# Patient Record
Sex: FEMALE | Race: WHITE | NOT HISPANIC OR LATINO | Employment: FULL TIME
[De-identification: names, ages, dates, MRNs, and addresses within clinical notes are randomized per-mention and may not be internally consistent; named-entity substitution may affect disease eponyms.]

---

## 2023-01-30 ENCOUNTER — HOSPITAL ENCOUNTER (OUTPATIENT)
Dept: DATA CONVERSION | Age: 28
End: 2023-01-30

## 2023-02-28 DIAGNOSIS — J40 BRONCHITIS: Primary | ICD-10-CM

## 2023-03-07 PROBLEM — F41.9 ANXIETY DISORDER: Status: ACTIVE | Noted: 2023-03-07

## 2023-03-07 PROBLEM — F32.A DEPRESSION: Status: ACTIVE | Noted: 2023-03-07

## 2023-03-07 PROBLEM — G89.29 CHRONIC PAIN: Status: ACTIVE | Noted: 2023-03-07

## 2023-03-07 PROBLEM — Q27.9 CAPILLARY MALFORMATION (HHS-HCC): Status: ACTIVE | Noted: 2023-03-07

## 2023-03-07 PROBLEM — M79.7 FIBROMYALGIA: Status: ACTIVE | Noted: 2023-03-07

## 2023-03-07 PROBLEM — E78.5 HYPERLIPIDEMIA: Status: ACTIVE | Noted: 2023-03-07

## 2023-03-07 PROBLEM — I47.11 INAPPROPRIATE SINUS TACHYCARDIA (CMS-HCC): Status: ACTIVE | Noted: 2023-03-07

## 2023-03-07 PROBLEM — L70.9 ACNE: Status: RESOLVED | Noted: 2023-03-07 | Resolved: 2023-03-07

## 2023-03-07 PROBLEM — J34.2 DEVIATED NASAL SEPTUM: Status: ACTIVE | Noted: 2023-03-07

## 2023-03-07 PROBLEM — G40.909 EPILEPSY (MULTI): Status: ACTIVE | Noted: 2023-03-07

## 2023-03-07 PROBLEM — I78.0 OSLER HEMORRHAGIC TELANGIECTASIA SYNDROME (CMS-HCC): Status: ACTIVE | Noted: 2023-03-07

## 2023-03-07 PROBLEM — R10.9 ABDOMINAL PAIN: Status: ACTIVE | Noted: 2023-03-07

## 2023-03-07 PROBLEM — R53.83 FATIGUE: Status: ACTIVE | Noted: 2023-03-07

## 2023-03-07 PROBLEM — G43.111 INTRACTABLE MIGRAINE WITH AURA WITH STATUS MIGRAINOSUS: Status: ACTIVE | Noted: 2023-03-07

## 2023-03-07 PROBLEM — R59.0 AXILLARY LYMPHADENOPATHY: Status: ACTIVE | Noted: 2023-03-07

## 2023-03-07 PROBLEM — J38.00 VOCAL CORD WEAKNESS: Status: ACTIVE | Noted: 2023-03-07

## 2023-03-07 PROBLEM — M79.672 LEFT FOOT PAIN: Status: ACTIVE | Noted: 2023-03-07

## 2023-03-07 PROBLEM — T23.262A PARTIAL THICKNESS BURN OF BACK OF LEFT HAND: Status: ACTIVE | Noted: 2023-03-07

## 2023-03-07 PROBLEM — M32.9 LUPUS (MULTI): Status: ACTIVE | Noted: 2023-03-07

## 2023-03-07 PROBLEM — N64.4 BREAST PAIN, RIGHT: Status: ACTIVE | Noted: 2023-03-07

## 2023-03-07 PROBLEM — L40.4 PSORIASIS, GUTTATE: Status: ACTIVE | Noted: 2023-03-07

## 2023-03-07 PROBLEM — K51.90 ULCERATIVE COLITIS (MULTI): Status: ACTIVE | Noted: 2023-03-07

## 2023-03-07 PROBLEM — R11.2 NAUSEA WITH VOMITING: Status: ACTIVE | Noted: 2023-03-07

## 2023-03-07 PROBLEM — E55.9 VITAMIN D DEFICIENCY: Status: ACTIVE | Noted: 2023-03-07

## 2023-03-07 PROBLEM — J30.9 ALLERGIC RHINITIS: Status: ACTIVE | Noted: 2023-03-07

## 2023-03-07 PROBLEM — N63.10 BREAST MASS, RIGHT: Status: ACTIVE | Noted: 2023-03-07

## 2023-03-07 PROBLEM — L40.9 PSORIASIS OF SCALP: Status: ACTIVE | Noted: 2023-03-07

## 2023-03-07 RX ORDER — CYCLOBENZAPRINE HCL 5 MG
TABLET ORAL
Status: ON HOLD | COMMUNITY
End: 2023-10-16 | Stop reason: ENTERED-IN-ERROR

## 2023-03-07 RX ORDER — HYDROXYZINE HYDROCHLORIDE 25 MG/1
TABLET, FILM COATED ORAL 3 TIMES DAILY PRN
COMMUNITY
End: 2023-08-01 | Stop reason: ALTCHOICE

## 2023-03-07 RX ORDER — ONDANSETRON 8 MG/1
TABLET, ORALLY DISINTEGRATING ORAL EVERY 8 HOURS PRN
COMMUNITY
Start: 2022-08-05 | End: 2023-06-07 | Stop reason: SDUPTHER

## 2023-03-07 RX ORDER — LIDOCAINE 50 MG/G
PATCH TOPICAL
COMMUNITY
Start: 2019-04-09 | End: 2023-05-16 | Stop reason: SDUPTHER

## 2023-03-07 RX ORDER — MIDODRINE HYDROCHLORIDE 5 MG/1
TABLET ORAL
COMMUNITY
End: 2023-03-19 | Stop reason: SDUPTHER

## 2023-03-07 RX ORDER — OXYCODONE HYDROCHLORIDE 5 MG/1
CAPSULE ORAL
COMMUNITY
Start: 2023-02-25 | End: 2023-04-11

## 2023-03-07 RX ORDER — RISPERIDONE 0.25 MG/1
0.25 TABLET ORAL DAILY
COMMUNITY
End: 2023-05-16 | Stop reason: SDUPTHER

## 2023-03-07 RX ORDER — LEVETIRACETAM 1000 MG/1
TABLET ORAL
COMMUNITY
End: 2023-04-11

## 2023-03-07 RX ORDER — ERENUMAB-AOOE 70 MG/ML
70 INJECTION SUBCUTANEOUS
COMMUNITY
Start: 2020-12-28 | End: 2023-04-11

## 2023-03-07 RX ORDER — METOPROLOL SUCCINATE 25 MG/1
TABLET, EXTENDED RELEASE ORAL 2 TIMES DAILY
COMMUNITY
End: 2023-03-28 | Stop reason: SDUPTHER

## 2023-03-07 RX ORDER — PREGABALIN 75 MG/1
CAPSULE ORAL 2 TIMES DAILY
COMMUNITY
End: 2023-04-11 | Stop reason: SDUPTHER

## 2023-03-07 RX ORDER — EPINEPHRINE 0.3 MG/.3ML
INJECTION SUBCUTANEOUS
COMMUNITY
Start: 2016-05-03 | End: 2023-04-11 | Stop reason: SDUPTHER

## 2023-03-10 DIAGNOSIS — J40 BRONCHITIS: ICD-10-CM

## 2023-03-10 DIAGNOSIS — R10.9 ABDOMINAL PAIN, UNSPECIFIED ABDOMINAL LOCATION: Primary | ICD-10-CM

## 2023-03-10 RX ORDER — OXYCODONE AND ACETAMINOPHEN 5; 325 MG/1; MG/1
1 TABLET ORAL 2 TIMES DAILY PRN
COMMUNITY
Start: 2023-03-01 | End: 2023-03-10 | Stop reason: SDUPTHER

## 2023-03-10 NOTE — TELEPHONE ENCOUNTER
Pt requests refill of:    Percocet 5mg  ..also a zpack, she was seen last week, she has a sinus infection    Sent to:  [facility] [place]

## 2023-03-11 RX ORDER — AZITHROMYCIN 250 MG/1
TABLET, FILM COATED ORAL
Qty: 6 TABLET | Refills: 0 | Status: SHIPPED | OUTPATIENT
Start: 2023-03-11 | End: 2023-03-13 | Stop reason: SDUPTHER

## 2023-03-13 RX ORDER — OXYCODONE AND ACETAMINOPHEN 5; 325 MG/1; MG/1
1 TABLET ORAL 2 TIMES DAILY PRN
Qty: 30 TABLET | Refills: 0 | Status: SHIPPED | OUTPATIENT
Start: 2023-03-13 | End: 2023-03-27 | Stop reason: SDUPTHER

## 2023-03-13 RX ORDER — AZITHROMYCIN 250 MG/1
TABLET, FILM COATED ORAL
Qty: 6 TABLET | Refills: 0 | Status: SHIPPED | OUTPATIENT
Start: 2023-03-13 | End: 2023-03-18

## 2023-03-14 ENCOUNTER — TELEMEDICINE (OUTPATIENT)
Dept: PRIMARY CARE | Facility: CLINIC | Age: 28
End: 2023-03-14
Payer: COMMERCIAL

## 2023-03-14 DIAGNOSIS — I78.0 OSLER HEMORRHAGIC TELANGIECTASIA SYNDROME (CMS-HCC): ICD-10-CM

## 2023-03-14 DIAGNOSIS — R10.84 GENERALIZED ABDOMINAL PAIN: ICD-10-CM

## 2023-03-14 DIAGNOSIS — G43.111 INTRACTABLE MIGRAINE WITH AURA WITH STATUS MIGRAINOSUS: ICD-10-CM

## 2023-03-14 DIAGNOSIS — G89.4 CHRONIC PAIN SYNDROME: ICD-10-CM

## 2023-03-14 DIAGNOSIS — I95.0 IDIOPATHIC HYPOTENSION: ICD-10-CM

## 2023-03-14 DIAGNOSIS — G40.909 NONINTRACTABLE EPILEPSY WITHOUT STATUS EPILEPTICUS, UNSPECIFIED EPILEPSY TYPE (MULTI): Primary | ICD-10-CM

## 2023-03-14 DIAGNOSIS — K51.018 ULCERATIVE PANCOLITIS WITH OTHER COMPLICATION (MULTI): ICD-10-CM

## 2023-03-14 PROBLEM — T23.262A PARTIAL THICKNESS BURN OF BACK OF LEFT HAND: Status: RESOLVED | Noted: 2023-03-07 | Resolved: 2023-03-14

## 2023-03-14 PROBLEM — M79.672 LEFT FOOT PAIN: Status: RESOLVED | Noted: 2023-03-07 | Resolved: 2023-03-14

## 2023-03-14 PROCEDURE — 99214 OFFICE O/P EST MOD 30 MIN: CPT | Performed by: FAMILY MEDICINE

## 2023-03-14 NOTE — PROGRESS NOTES
Subjective   Patient ID: Pretty Devine is a 28 y.o. female who presents for No chief complaint on file..    HPI     Review of Systems    Objective   There were no vitals taken for this visit.    Physical Exam    Assessment/Plan   {Assess/PlanSmartLinks:87914}

## 2023-03-15 NOTE — PROGRESS NOTES
Subjective   Patient ID: Pretty Devine is a 28 y.o. female who presents for Abdominal Pain.  HPI  Fu osler,seizures      Chronic,stable    Review of Systems  12 Systems have been reviewed as follows.  Constitutional: Fever, weight gain, weight loss, appetite change, night sweats, fatigue, chills.  Eyes : blurry, double vision, vision, loss, tearing, redness, pain, sensitivity to light, glaucoma.  Ears, nose, mouth, and throat: Hearing loss, ringing in the ears, ear pain, nasal congestion, nasal drainage, nosebleeds, mouth, throat, irritation tooth problem.  Cardiovascular :chest pain, pressure, heart racing, palpitations, sweating, leg swelling, high or low blood pressure  Pulmonary: Cough, yellow or green sputum, blood and sputum, shortness of breath, wheezing  Gastrointestinal: Nausea, vomiting, diarrhea, constipation, pain, blood in stool, or vomitus, heartburn, difficulty swallowing  Genitourinary: incontinence, abnormal bleeding, abnormal discharge, urinary frequency, urinary hesitancy, pain, impotence sexual problem, infection, urinary retention  Musculoskeletal: Pain, stiffness, joint, redness or warmth, arthritis, back pain, weakness, muscle wasting, sprain or fracture  Neuro: Weight weakness, dizziness, change in voice, change in taste change in vision, change in hearing, loss, or change of sensation, trouble walking, balance problems coordination problems, shaking, speech problem  Endocrine , cold or heat intolerance, blood sugar problem, weight gain or loss missed periods hot flashes, sweats, change in body hair, change in libido, increased thirst, increased urination  Heme/lymph: Swelling, bleeding, problem anemia, bruising, enlarged lymph nodes  Allergic/immunologic: H. plus nasal drip, watery itchy eyes, nasal drainage, immunosuppressed  The above were reviewed and noted negative except as noted in HPI and Problem List.    Objective   Physical Exam  There were no vitals taken for this visit.  Lab  Results   Component Value Date    WBC 2.8 (L) 02/25/2023    HGB 9.9 (L) 02/25/2023    HCT 30.9 (L) 02/25/2023    MCV 84 02/25/2023     02/25/2023       Assessment/Plan   Problem List Items Addressed This Visit          Nervous    Epilepsy (CMS/HCC) - Primary    Relevant Orders    Follow Up In [facility] - PCP    Chronic pain    Relevant Orders    Follow Up In [facility] - PCP    Abdominal pain    Relevant Orders    Follow Up In [facility] - PCP       Circulatory    Osler hemorrhagic telangiectasia syndrome (CMS/HCC)    Relevant Orders    Follow Up In [facility] - PCP       Digestive    Ulcerative colitis (CMS/HCC)    Relevant Orders    Follow Up In [facility] - PCP       Other    Intractable migraine with aura with status migrainosus    Relevant Orders    Follow Up In [facility] - PCP     Other Visit Diagnoses       Idiopathic hypotension        Relevant Medications    midodrine (Proamatine) 5 mg tablet        See prev visit    Virtual Visit - Audio and Visual Communication Real Time

## 2023-03-19 RX ORDER — MIDODRINE HYDROCHLORIDE 5 MG/1
5 TABLET ORAL 3 TIMES DAILY
Qty: 90 TABLET | Refills: 2 | Status: SHIPPED | OUTPATIENT
Start: 2023-03-19 | End: 2023-07-03 | Stop reason: SDUPTHER

## 2023-03-21 ENCOUNTER — APPOINTMENT (OUTPATIENT)
Dept: PRIMARY CARE | Facility: CLINIC | Age: 28
End: 2023-03-21
Payer: COMMERCIAL

## 2023-03-27 DIAGNOSIS — R10.9 ABDOMINAL PAIN, UNSPECIFIED ABDOMINAL LOCATION: ICD-10-CM

## 2023-03-27 RX ORDER — OXYCODONE HYDROCHLORIDE 5 MG/1
CAPSULE ORAL
Qty: 15 CAPSULE | Status: CANCELLED | OUTPATIENT
Start: 2023-03-27

## 2023-03-27 NOTE — TELEPHONE ENCOUNTER
Pt needs a refill on oxycodone 5mg  Dr Aguilera informed pt to remind him to send a 4 week supply this time  [facility] in [place]

## 2023-03-28 DIAGNOSIS — I47.11 INAPPROPRIATE SINUS TACHYCARDIA (CMS-HCC): Primary | ICD-10-CM

## 2023-03-28 RX ORDER — OXYCODONE AND ACETAMINOPHEN 5; 325 MG/1; MG/1
1 TABLET ORAL 2 TIMES DAILY PRN
Qty: 30 TABLET | Refills: 0 | Status: SHIPPED | OUTPATIENT
Start: 2023-03-28 | End: 2023-04-03 | Stop reason: SDUPTHER

## 2023-03-28 RX ORDER — METOPROLOL SUCCINATE 25 MG/1
25 TABLET, EXTENDED RELEASE ORAL DAILY PRN
Qty: 30 TABLET | Refills: 2 | Status: SHIPPED | OUTPATIENT
Start: 2023-03-28 | End: 2023-05-06 | Stop reason: ALTCHOICE

## 2023-03-28 NOTE — TELEPHONE ENCOUNTER
----- Message from Pretty Devine sent at 3/28/2023  8:31 AM EDT -----  Regarding: Medication   Contact: [phone]  Can you please sent over Metoprolol Tartrate 25 Mg PRN for heart rate over 120, and then the pain medication 5/325 and he said a 4 week supply at twice a day!   Thank you   Pretty Devine  [phone]

## 2023-03-29 DIAGNOSIS — R00.0 INCREASED HEART RATE: Primary | ICD-10-CM

## 2023-03-29 RX ORDER — METOPROLOL TARTRATE 25 MG/1
TABLET, FILM COATED ORAL
COMMUNITY
Start: 2023-02-27 | End: 2023-03-29 | Stop reason: SDUPTHER

## 2023-03-29 RX ORDER — METOPROLOL TARTRATE 25 MG/1
TABLET, FILM COATED ORAL
Qty: 30 TABLET | Refills: 2 | Status: SHIPPED | OUTPATIENT
Start: 2023-03-29

## 2023-03-29 NOTE — TELEPHONE ENCOUNTER
----- Message from Pretty Devine sent at 3/28/2023  8:31 AM EDT -----  Regarding: Medication   Contact: [phone]  Can you please sent over Metoprolol Tartrate 25 Mg PRN for heart rate over 120, and then the pain medication 5/325 and he said a 4 week supply at twice a day!   Thank you   Pretty Devine  [phone]

## 2023-03-29 NOTE — TELEPHONE ENCOUNTER
----- Message from Pretty Devine sent at 3/28/2023  8:31 AM EDT -----  Regarding: Medication   Contact: [phone]  Can you please sent over Metoprolol Tartrate 25 Mg PRN for heart rate over 120, and then the pain medication 5/325 and he said a 4 week supply at twice a day!   Thank you   Pretty Devine  [phone]

## 2023-04-03 ENCOUNTER — TELEPHONE (OUTPATIENT)
Dept: PRIMARY CARE | Facility: CLINIC | Age: 28
End: 2023-04-03

## 2023-04-03 DIAGNOSIS — R10.9 ABDOMINAL PAIN, UNSPECIFIED ABDOMINAL LOCATION: ICD-10-CM

## 2023-04-03 RX ORDER — OXYCODONE AND ACETAMINOPHEN 5; 325 MG/1; MG/1
1 TABLET ORAL ONCE
Qty: 1 TABLET | Refills: 0 | Status: SHIPPED | OUTPATIENT
Start: 2023-04-03 | End: 2023-04-07 | Stop reason: SDUPTHER

## 2023-04-03 NOTE — TELEPHONE ENCOUNTER
Dr Aguilera pt    Pt phoned office and stated CB told her that if she needed her oxycodone upped to 3 times a day to call the office and let him know. Pt is calling to have this changed and would like the script sent to [facility] cassandra Hassan

## 2023-04-04 ENCOUNTER — TELEPHONE (OUTPATIENT)
Dept: PRIMARY CARE | Facility: CLINIC | Age: 28
End: 2023-04-04

## 2023-04-06 ENCOUNTER — TELEPHONE (OUTPATIENT)
Dept: PRIMARY CARE | Facility: CLINIC | Age: 28
End: 2023-04-06

## 2023-04-06 NOTE — TELEPHONE ENCOUNTER
Dr. Aguilera patient  Requesting refill Percocet, two times daily  [facility] in [place] on [address]     Patient asking for a call when sent

## 2023-04-07 DIAGNOSIS — R10.9 ABDOMINAL PAIN, UNSPECIFIED ABDOMINAL LOCATION: ICD-10-CM

## 2023-04-07 RX ORDER — OXYCODONE AND ACETAMINOPHEN 5; 325 MG/1; MG/1
1 TABLET ORAL 3 TIMES DAILY
Qty: 6 TABLET | Refills: 0 | Status: SHIPPED | OUTPATIENT
Start: 2023-04-11 | End: 2023-04-11 | Stop reason: SDUPTHER

## 2023-04-07 NOTE — PROGRESS NOTES
Dr. Aguilera patient OARRS reviewed [employer]DS contract reviewed patient reports that Dr. Aguilera has approved increasing narcotic to 3 times per day.  I will send a short prescription for  on 4/11 and pt has follow up with pcp on 4/13

## 2023-04-11 ENCOUNTER — TELEMEDICINE (OUTPATIENT)
Dept: PRIMARY CARE | Facility: CLINIC | Age: 28
End: 2023-04-11
Payer: COMMERCIAL

## 2023-04-11 DIAGNOSIS — I47.11 INAPPROPRIATE SINUS TACHYCARDIA (CMS-HCC): ICD-10-CM

## 2023-04-11 DIAGNOSIS — F31.9 BIPOLAR 1 DISORDER (MULTI): ICD-10-CM

## 2023-04-11 DIAGNOSIS — I78.0 OSLER HEMORRHAGIC TELANGIECTASIA SYNDROME (CMS-HCC): Primary | ICD-10-CM

## 2023-04-11 DIAGNOSIS — J30.1 SEASONAL ALLERGIC RHINITIS DUE TO POLLEN: ICD-10-CM

## 2023-04-11 DIAGNOSIS — M32.9 LUPUS (MULTI): ICD-10-CM

## 2023-04-11 DIAGNOSIS — R10.9 ABDOMINAL PAIN, UNSPECIFIED ABDOMINAL LOCATION: ICD-10-CM

## 2023-04-11 DIAGNOSIS — G40.909 NONINTRACTABLE EPILEPSY WITHOUT STATUS EPILEPTICUS, UNSPECIFIED EPILEPSY TYPE (MULTI): ICD-10-CM

## 2023-04-11 DIAGNOSIS — K51.018 ULCERATIVE PANCOLITIS WITH OTHER COMPLICATION (MULTI): ICD-10-CM

## 2023-04-11 PROCEDURE — 99214 OFFICE O/P EST MOD 30 MIN: CPT | Performed by: FAMILY MEDICINE

## 2023-04-11 RX ORDER — LEVOFLOXACIN 750 MG/1
1 TABLET ORAL DAILY
COMMUNITY
Start: 2022-11-18 | End: 2023-05-06 | Stop reason: ALTCHOICE

## 2023-04-11 RX ORDER — PROMETHAZINE HYDROCHLORIDE 12.5 MG/1
1 TABLET ORAL
COMMUNITY
Start: 2022-08-05 | End: 2023-05-06 | Stop reason: ALTCHOICE

## 2023-04-11 RX ORDER — ONDANSETRON HYDROCHLORIDE 8 MG/1
1 TABLET, FILM COATED ORAL EVERY 8 HOURS PRN
COMMUNITY
Start: 2022-06-07 | End: 2023-05-06 | Stop reason: ALTCHOICE

## 2023-04-11 RX ORDER — VENLAFAXINE 37.5 MG/1
37.5 TABLET ORAL
COMMUNITY
Start: 2022-10-05 | End: 2023-05-06 | Stop reason: ALTCHOICE

## 2023-04-11 RX ORDER — OXYCODONE AND ACETAMINOPHEN 5; 325 MG/1; MG/1
1 TABLET ORAL 3 TIMES DAILY
Qty: 30 TABLET | Refills: 0 | Status: SHIPPED | OUTPATIENT
Start: 2023-04-11 | End: 2023-04-21 | Stop reason: SDUPTHER

## 2023-04-11 RX ORDER — CEFDINIR 300 MG/1
CAPSULE ORAL
COMMUNITY
Start: 2022-11-29 | End: 2023-05-06 | Stop reason: ALTCHOICE

## 2023-04-11 RX ORDER — NALOXONE HYDROCHLORIDE 4 MG/.1ML
4 SPRAY NASAL AS NEEDED
Qty: 2 EACH | Refills: 0
Start: 2023-04-11 | End: 2024-04-10

## 2023-04-11 RX ORDER — EPINEPHRINE 0.3 MG/.3ML
INJECTION SUBCUTANEOUS
Qty: 2 EACH | Refills: 2 | Status: SHIPPED | OUTPATIENT
Start: 2023-04-11

## 2023-04-11 RX ORDER — PREGABALIN 75 MG/1
75 CAPSULE ORAL 2 TIMES DAILY
Qty: 60 CAPSULE | Refills: 0 | Status: SHIPPED | OUTPATIENT
Start: 2023-04-11 | End: 2023-05-06 | Stop reason: ALTCHOICE

## 2023-04-11 ASSESSMENT — ENCOUNTER SYMPTOMS
HEMATOCHEZIA: 0
ARTHRALGIAS: 1
FREQUENCY: 0
FEVER: 0
FLATUS: 0
DIARRHEA: 0
DYSURIA: 0
MYALGIAS: 1
VOMITING: 0
HEMATURIA: 0
BELCHING: 0
WEIGHT LOSS: 0
ANOREXIA: 0
HEADACHES: 0
ABDOMINAL PAIN: 1
CONSTIPATION: 0
NAUSEA: 1

## 2023-04-11 NOTE — PROGRESS NOTES
Subjective   Patient ID: Pretty Devine is a 28 y.o. female who presents for Abdominal Pain.    Abdominal Pain  This is a chronic problem. The current episode started more than 1 year ago. The onset quality is gradual. The problem occurs constantly. The most recent episode lasted 14 months. The problem has been waxing and waning. The pain is located in the generalized abdominal region. The pain is at a severity of 6/10. The quality of the pain is sharp. The abdominal pain radiates to the RLQ. Associated symptoms include arthralgias, myalgias and nausea. Pertinent negatives include no anorexia, belching, constipation, diarrhea, dysuria, fever, flatus, frequency, headaches, hematochezia, hematuria, melena, vomiting or weight loss. The pain is aggravated by movement. The pain is relieved by Nothing.        Review of Systems   Constitutional:  Negative for fever and weight loss.   Gastrointestinal:  Positive for abdominal pain and nausea. Negative for anorexia, constipation, diarrhea, flatus, hematochezia, melena and vomiting.   Genitourinary:  Negative for dysuria, frequency and hematuria.   Musculoskeletal:  Positive for arthralgias and myalgias.   Neurological:  Negative for headaches.       Objective   There were no vitals taken for this visit.    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    Relevant Medications    EPINEPHrine 0.3 mg/0.3 mL injection syringe    Other Relevant Orders    Follow Up In [facility] - PCP    Inappropriate sinus tachycardia    Relevant Medications    EPINEPHrine 0.3 mg/0.3 mL injection syringe    Other Relevant Orders    Follow Up In [facility] - PCP       Digestive    Ulcerative colitis (CMS/[facility])    Relevant Orders    Follow Up In [facility] - PCP       Other    Allergic rhinitis    Relevant Orders    Follow Up In [facility] - PCP    Lupus (CMS/[facility])    Relevant Orders    Follow Up In [facility] - PCP     See prev visit    Continue current medications and therapy for chronic medical conditions    Patient's use of medication is allowing patient to be able to perform ADL's. Patient is always being evaluated for the possibility of lowering the medication dosage.    I have personally reviewed the OARRS report with the patient and have considered the risk of abuse, addiction, dependence and diversion.    Virtual Visit - Audio and Visual Communication Real Time

## 2023-04-13 ENCOUNTER — APPOINTMENT (OUTPATIENT)
Dept: PRIMARY CARE | Facility: CLINIC | Age: 28
End: 2023-04-13
Payer: COMMERCIAL

## 2023-04-19 ENCOUNTER — PATIENT MESSAGE (OUTPATIENT)
Dept: PRIMARY CARE | Facility: CLINIC | Age: 28
End: 2023-04-19
Payer: COMMERCIAL

## 2023-04-19 DIAGNOSIS — R10.9 ABDOMINAL PAIN, UNSPECIFIED ABDOMINAL LOCATION: ICD-10-CM

## 2023-04-19 DIAGNOSIS — L40.9 PSORIASIS OF SCALP: Primary | ICD-10-CM

## 2023-04-19 RX ORDER — DESOXIMETASONE 2.5 MG/G
0.5 CREAM TOPICAL DAILY
COMMUNITY
Start: 2017-12-26 | End: 2023-04-19 | Stop reason: SDUPTHER

## 2023-04-21 ENCOUNTER — TELEPHONE (OUTPATIENT)
Dept: PRIMARY CARE | Facility: CLINIC | Age: 28
End: 2023-04-21

## 2023-04-21 DIAGNOSIS — L40.9 PSORIASIS OF SCALP: ICD-10-CM

## 2023-04-21 DIAGNOSIS — R10.9 ABDOMINAL PAIN, UNSPECIFIED ABDOMINAL LOCATION: ICD-10-CM

## 2023-04-21 RX ORDER — OXYCODONE AND ACETAMINOPHEN 5; 325 MG/1; MG/1
1 TABLET ORAL EVERY 8 HOURS PRN
Qty: 45 TABLET | Refills: 0 | Status: SHIPPED | OUTPATIENT
Start: 2023-04-21 | End: 2023-05-05 | Stop reason: SDUPTHER

## 2023-04-21 RX ORDER — DESOXIMETASONE 2.5 MG/G
1 CREAM TOPICAL 2 TIMES DAILY
Qty: 15 G | Refills: 1 | Status: SHIPPED | OUTPATIENT
Start: 2023-04-21

## 2023-04-21 RX ORDER — OXYCODONE AND ACETAMINOPHEN 5; 325 MG/1; MG/1
1 TABLET ORAL 3 TIMES DAILY
Qty: 30 TABLET | Refills: 0 | OUTPATIENT
Start: 2023-04-21

## 2023-04-21 RX ORDER — TRIAMCINOLONE ACETONIDE 1 MG/G
CREAM TOPICAL 2 TIMES DAILY
Qty: 80 G | Refills: 2 | Status: SHIPPED | OUTPATIENT
Start: 2023-04-21

## 2023-04-21 NOTE — TELEPHONE ENCOUNTER
Dr. Aguilera patient    Pharmacy says that desoximetasone (Topicort) 0.25 % cream is not covered by insurance.  Would CB like to prescribe something else?

## 2023-05-02 ENCOUNTER — APPOINTMENT (OUTPATIENT)
Dept: PRIMARY CARE | Facility: CLINIC | Age: 28
End: 2023-05-02
Payer: COMMERCIAL

## 2023-05-04 ENCOUNTER — APPOINTMENT (OUTPATIENT)
Dept: PRIMARY CARE | Facility: CLINIC | Age: 28
End: 2023-05-04
Payer: COMMERCIAL

## 2023-05-05 DIAGNOSIS — R10.9 ABDOMINAL PAIN, UNSPECIFIED ABDOMINAL LOCATION: ICD-10-CM

## 2023-05-05 NOTE — TELEPHONE ENCOUNTER
Dr. Aguilera patient  Refill for Percocet  [id number] - [place], [place]  Will run out this weekend

## 2023-05-06 ENCOUNTER — TELEMEDICINE (OUTPATIENT)
Dept: PRIMARY CARE | Facility: CLINIC | Age: 28
End: 2023-05-06
Payer: COMMERCIAL

## 2023-05-06 DIAGNOSIS — R10.31 RIGHT LOWER QUADRANT ABDOMINAL PAIN: Primary | ICD-10-CM

## 2023-05-06 PROCEDURE — 99213 OFFICE O/P EST LOW 20 MIN: CPT | Performed by: FAMILY MEDICINE

## 2023-05-06 RX ORDER — ONDANSETRON 4 MG/1
1 TABLET, FILM COATED ORAL EVERY 8 HOURS PRN
COMMUNITY
Start: 2017-02-02 | End: 2023-06-15

## 2023-05-06 RX ORDER — METOPROLOL TARTRATE 25 MG/1
TABLET, FILM COATED ORAL
COMMUNITY
Start: 2019-02-12 | End: 2023-05-06 | Stop reason: ALTCHOICE

## 2023-05-06 RX ORDER — METOPROLOL SUCCINATE 25 MG/1
25 TABLET, EXTENDED RELEASE ORAL 2 TIMES DAILY
COMMUNITY
Start: 2019-11-20 | End: 2023-10-05 | Stop reason: SDUPTHER

## 2023-05-06 RX ORDER — RISPERIDONE 0.5 MG/1
1 TABLET ORAL NIGHTLY
COMMUNITY
Start: 2019-03-01 | End: 2023-05-06 | Stop reason: ALTCHOICE

## 2023-05-06 RX ORDER — LIDOCAINE 50 MG/G
PATCH TOPICAL
COMMUNITY
Start: 2019-03-08 | End: 2023-05-06 | Stop reason: ALTCHOICE

## 2023-05-06 RX ORDER — ONDANSETRON 8 MG/1
TABLET, ORALLY DISINTEGRATING ORAL EVERY 8 HOURS PRN
COMMUNITY
End: 2023-05-06 | Stop reason: ALTCHOICE

## 2023-05-06 RX ORDER — PREGABALIN 75 MG/1
CAPSULE ORAL 2 TIMES DAILY
COMMUNITY
End: 2023-06-19 | Stop reason: SDUPTHER

## 2023-05-06 RX ORDER — OXYCODONE AND ACETAMINOPHEN 5; 325 MG/1; MG/1
TABLET ORAL 2 TIMES DAILY PRN
COMMUNITY
End: 2023-06-02 | Stop reason: SDUPTHER

## 2023-05-06 RX ORDER — OXYCODONE AND ACETAMINOPHEN 5; 325 MG/1; MG/1
1 TABLET ORAL EVERY 8 HOURS PRN
Qty: 45 TABLET | Refills: 0 | Status: SHIPPED | OUTPATIENT
Start: 2023-05-06 | End: 2023-05-16 | Stop reason: ALTCHOICE

## 2023-05-06 NOTE — PROGRESS NOTES
Calls with c/o abdominal  pain causing anxiety  Admitted last Sunday to hospital for same abdominal pain  Got discharged late Friday night--   abdominal pain and  max  Had CT scan and MRCP during inpatient  Gave her IV meds for pain and it helped--narcotics--cannot take any NSAIDS because of bleeding risk  Kept in hospital for pain control and low Iron--got iron infusions  Gave her an option to stay or go-- she signed AMA but didn't realize she was signing out AMA--she was confused and when she left, they did not send her with any meds for her pain  Now she is in pain and very anxious about it--    Prior hx:  Had gallbladder out 3 months ago--that did not help with pain at all  Has had 15 abdominal surgeries-- horrible adhesions  Lost 100# was 102# they thought taking GB out may help with pain and to gain weight  Have DM but does not take meds--lost weight and no longer needs meds  Lost the weight in 6-7 months-- eating 3 meals a day 5-6 snacks 3 ensures and could not keep weight on  Abdominal pain is chronic--it got really bad last Saturday-- 7 days ago  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--  Dad had this disorder too--dx in his 40's but not as severe--  Usually has a femoral catheter to look for AVM--last had that a year ago-- this pain is worse than ---  Pain feels like someone stabbing twisting pulling-- whole lower right side-- still has appendix--   Appetite-- not good because of pain and anxiety--  Soup for lunch and boost for breakfast  No fever chills aches  Didn't sleep well last night  ER visit prior was prior to Gallbladder surgery-  UC has not been an issue since her early 20's  This is worse than her usual pain--constant-- nothing makes it worse--sits with heating pad helps a bit  Food makes it worse  No vomiting  +nausea  Pain into right side but into back  No urinary sxs  No diarrhea or constipation  Nl BM this am no blood--not black or tarry  Was anemic last week-- hematocrit <11 iron  infusion--- never had a blood transfusion  Eats ice as soon as she gets below 11--     Basically calls for pain meds or if I cannot give pain meds,  something to help with anxiety    ALL OTHER ROS (-)    General appearance:  Vitals available from patient?No  Alert, oriented, pleasant, in no apparent distress Yes  Answers questions appropriatelyYes  Eyes clear?Yes  Throat exam Not Available  Is patient in respiratory distressNo  Is patient coughing during consult:No  Audible wheezing noted? :No  Pt sounds congested?: No  Sniffing or rhinorrhea?: No  Frontal sinus TTP by palpation? N/A  Maxillary sinus TTP by palpation?N/A  Tender neck LAD by pt exam?:N/A  Preauricular LAD by pt exam?:N/A  Abdominal TTP by pt exam?:Yes  CVS tenderness by pt exam?N/A  Psychiatric: Affect normalYes  Other relevant physical exam:      Pt location in [place] and consent obtained. Telemedicine appropriate evaluation completed. Appropriate physical exam done. \    A/P  Pt needs pain meds as she cannot take NSAIDs and the tylenol does not help  I called and spoke with her PCP (Dr. Aguilera) as I cannot write for narcotics or antianxiety meds over telemedicine.  He will send her rx for pain to her pharmacy.  I called patient and informed her of this and she will follow up with him next week and will be seen sooner in ED of symptoms worsen.  All meds reviewed in detail with patient at today's visit

## 2023-05-07 NOTE — PATIENT INSTRUCTIONS
[email or web address]  FOR NON URGENT questions only.  Allow up to 72 hours for response.   Dr. Aguilera will call you in medication for your pain    Rest and drink plenty of fluids    Tylenol as needed for pain and fever (unless you have been told not to take these because of your personal medical history)    Discussed options and precautions:   Viral versus bacterial infection; use of medications; possible side effects; appropriate over-the-counter medications; possible complications and /or when to follow-up.    Follow-up in 1 to 2 days if not improving.  Follow-up immediately if symptoms worsen.    All red flags requiring in person care were discussed.  All patient's questions were answered.    Limitations to telemedicine include inability to do a complete and accurate physical exam.  Any concerns regarding this were conveyed with the patient and in person follow-up recommended if patient nature of illness does not progress as anticipated during this visit.

## 2023-05-08 ENCOUNTER — PATIENT OUTREACH (OUTPATIENT)
Dept: PRIMARY CARE | Facility: CLINIC | Age: 28
End: 2023-05-08
Payer: COMMERCIAL

## 2023-05-08 NOTE — PROGRESS NOTES
Discharge Facility: [facility]  Discharge Diagnosis: Dickinson-Osler syndrome  Admission Date:  4/29/23  Discharge Date:  5/5/23- AMA  PCP Appointment Date: TBD  Specialist Appointment Date:   Follow-Up Appointment 01:           Physician/Dept/Service:   Dr. Christian Mast          Reason for Referral:   HHT follow up          Call to Schedule in:   2 weeks  Follow-Up Appointment 02:           Physician/Dept/Service:   Dr. Hill          Reason for Referral:   GI follow up          Call to Schedule in:   2 weeks  Follow-Up Appointment 03:           Physician/Dept/Service:   Dr. Resendez          Reason for Referral:   migraine follow up          Call to Schedule in:   2 weeks    Hospital Encounter and Summary: Linked   2 attempts were made to reach patient to assess needs.   No return call as of this note.

## 2023-05-11 ENCOUNTER — TELEMEDICINE (OUTPATIENT)
Dept: PRIMARY CARE | Facility: CLINIC | Age: 28
End: 2023-05-11
Payer: COMMERCIAL

## 2023-05-11 ENCOUNTER — TELEPHONE (OUTPATIENT)
Dept: PRIMARY CARE | Facility: CLINIC | Age: 28
End: 2023-05-11

## 2023-05-11 DIAGNOSIS — F31.9 BIPOLAR 1 DISORDER (MULTI): ICD-10-CM

## 2023-05-11 DIAGNOSIS — I78.0 OSLER HEMORRHAGIC TELANGIECTASIA SYNDROME (CMS-HCC): ICD-10-CM

## 2023-05-11 DIAGNOSIS — L40.4 PSORIASIS, GUTTATE: ICD-10-CM

## 2023-05-11 DIAGNOSIS — M32.9 LUPUS (MULTI): ICD-10-CM

## 2023-05-11 DIAGNOSIS — G40.909 NONINTRACTABLE EPILEPSY WITHOUT STATUS EPILEPTICUS, UNSPECIFIED EPILEPSY TYPE (MULTI): Primary | ICD-10-CM

## 2023-05-11 DIAGNOSIS — K51.018 ULCERATIVE PANCOLITIS WITH OTHER COMPLICATION (MULTI): ICD-10-CM

## 2023-05-11 PROCEDURE — 99496 TRANSJ CARE MGMT HIGH F2F 7D: CPT | Performed by: FAMILY MEDICINE

## 2023-05-11 NOTE — PROGRESS NOTES
Subjective   Patient ID: Pretty Devine is a 28 y.o. female who presents for Hospital Follow-up (Patient presents in telehealth visit for a recent hospitalization. Patient was seen at [facility] due to severe abdominal pains. Patient was seen from 4/29/23 until 5/5/23. Patient admits she has been experiencing a severe abdominal pains, fatigue and nausea. ) and Medication Problem (Patient is currently being prescribed Oxycodone acetaminophen 5-325 mg 1 tab bid prn which she feels is not helping her pains. Patient would like to discuss a possible increase or alternative to help with her severe pains. ).    HPI   Hospital Follow up-  Patient presents in office for a follow up from a recent hospitalization. Patient was admitted into [facility] on 04/29/2023 and discharged on 05/05/2023. Patient admits she went to the ER due to severe abdominal pains she was experiencing. Since being discharged patient admits she has still been experiencing severe abdominal pains, fatigue and nausea.     Review of Systems  Constitutional: Fever, weight gain, weight loss, appetite change, night sweats, fatigue, chills.  Eyes : blurry, double vision, vision, loss, tearing, redness, pain, sensitivity to light, glaucoma.  Ears: nose, mouth, and throat: Hearing loss, ringing in the ears, ear pain, nasal congestion, nasal drainage, nosebleeds, mouth, throat, irritation tooth problem.  Cardiovascular: chest pain, pressure, heart racing, palpitations, sweating, leg swelling, high or low blood pressure  Pulmonary: Cough, yellow or green sputum, blood and sputum, shortness of breath, wheezing  Gastrointestinal: Nausea, vomiting, diarrhea, constipation, pain, blood in stool, or vomitus, heartburn, difficulty swallowing  Genitourinary: incontinence, abnormal bleeding, abnormal discharge, urinary frequency, urinary hesitancy, pain, impotence sexual problem, infection, urinary retention  Musculoskeletal: Pain, stiffness, joint, redness or warmth,  arthritis, back pain, weakness, muscle wasting, sprain or fracture  Neuro: Weight weakness, dizziness, change in voice, change in taste change in vision, change in hearing, loss, or change of sensation, trouble walking, balance problems coordination problems, shaking, speech problem  Endocrine: cold or heat intolerance, blood sugar problem, weight gain or loss missed periods hot flashes, sweats, change in body hair, change in libido, increased thirst, increased urination  Heme/lymph: Swelling, bleeding, problem anemia, bruising, enlarged lymph nodes  Allergic/immunologic: H. plus nasal drip, watery itchy eyes, nasal drainage, immunosuppressed  The above were reviewed and noted negative except as noted in HPI and Problem List.   Physical Exam  Constitutional: Well developed, well nourished, alert and in no acute distress   Psychiatric: Mood calm and affect normal    Assessment/Plan   Problem List Items Addressed This Visit          Nervous    Epilepsy (CMS/HCC) - Primary    Relevant Orders    Follow Up In [facility] - PCP       Circulatory    Osler hemorrhagic telangiectasia syndrome (CMS/HCC)    Relevant Orders    Follow Up In [facility] - PCP       Digestive    Ulcerative colitis (CMS/HCC)    Relevant Orders    Follow Up In [facility] - PCP       Immune    Psoriasis, guttate    Relevant Orders    Follow Up In [facility] - PCP       Other    Lupus (CMS/HCC)    Relevant Orders    Follow Up In [facility] - PCP    Bipolar 1 disorder (CMS/[facility])    Relevant Orders    Follow Up In [facility] - PCP     Continue current medications and therapy for chronic medical conditions    Patient's use of medication is allowing patient to be able to perform ADL's. Patient is always being evaluated for the possibility of lowering the medication dosage.    I have personally reviewed the OARRS report with the patient and have considered the risk of abuse, addiction,  dependence and diversion.    Virtual Visit - Audio and Visual Communication Real Time     See last visit alljarethpts

## 2023-05-12 ENCOUNTER — TELEPHONE (OUTPATIENT)
Dept: PRIMARY CARE | Facility: CLINIC | Age: 28
End: 2023-05-12
Payer: COMMERCIAL

## 2023-05-12 NOTE — TELEPHONE ENCOUNTER
Dr. Aguilera patient    Patient is asking if there is another medication she can be prescribed that will last longer and that she can take less frequently.    Please advise, thank you

## 2023-05-15 ENCOUNTER — TELEMEDICINE (OUTPATIENT)
Dept: PRIMARY CARE | Facility: CLINIC | Age: 28
End: 2023-05-15
Payer: COMMERCIAL

## 2023-05-15 DIAGNOSIS — I78.0 OSLER HEMORRHAGIC TELANGIECTASIA SYNDROME (CMS-HCC): ICD-10-CM

## 2023-05-15 DIAGNOSIS — F31.9 BIPOLAR 1 DISORDER (MULTI): ICD-10-CM

## 2023-05-15 DIAGNOSIS — M32.9 LUPUS (MULTI): ICD-10-CM

## 2023-05-15 DIAGNOSIS — G40.909 NONINTRACTABLE EPILEPSY WITHOUT STATUS EPILEPTICUS, UNSPECIFIED EPILEPSY TYPE (MULTI): ICD-10-CM

## 2023-05-15 DIAGNOSIS — K51.018 ULCERATIVE PANCOLITIS WITH OTHER COMPLICATION (MULTI): ICD-10-CM

## 2023-05-15 DIAGNOSIS — L40.4 PSORIASIS, GUTTATE: ICD-10-CM

## 2023-05-15 PROCEDURE — 99214 OFFICE O/P EST MOD 30 MIN: CPT | Performed by: FAMILY MEDICINE

## 2023-05-15 NOTE — PROGRESS NOTES
Subjective   Patient ID: Pretty Devine is a 28 y.o. female who presents for No chief complaint on file..  HPI    Review of Systems  12 Systems have been reviewed as follows.  Constitutional: Fever, weight gain, weight loss, appetite change, night sweats, fatigue, chills.  Eyes : blurry, double vision, vision, loss, tearing, redness, pain, sensitivity to light, glaucoma.  Ears, nose, mouth, and throat: Hearing loss, ringing in the ears, ear pain, nasal congestion, nasal drainage, nosebleeds, mouth, throat, irritation tooth problem.  Cardiovascular :chest pain, pressure, heart racing, palpitations, sweating, leg swelling, high or low blood pressure  Pulmonary: Cough, yellow or green sputum, blood and sputum, shortness of breath, wheezing  Gastrointestinal: Nausea, vomiting, diarrhea, constipation, pain, blood in stool, or vomitus, heartburn, difficulty swallowing  Genitourinary: incontinence, abnormal bleeding, abnormal discharge, urinary frequency, urinary hesitancy, pain, impotence sexual problem, infection, urinary retention  Musculoskeletal: Pain, stiffness, joint, redness or warmth, arthritis, back pain, weakness, muscle wasting, sprain or fracture  Neuro: Weight weakness, dizziness, change in voice, change in taste change in vision, change in hearing, loss, or change of sensation, trouble walking, balance problems coordination problems, shaking, speech problem  Endocrine , cold or heat intolerance, blood sugar problem, weight gain or loss missed periods hot flashes, sweats, change in body hair, change in libido, increased thirst, increased urination  Heme/lymph: Swelling, bleeding, problem anemia, bruising, enlarged lymph nodes  Allergic/immunologic: H. plus nasal drip, watery itchy eyes, nasal drainage, immunosuppressed  The above were reviewed and noted negative except as noted in HPI and Problem List.    Objective   Physical Exam    Constitutional: Well developed, well nourished, alert and in no acute  distress     There were no vitals taken for this visit.  Lab Results   Component Value Date    WBC CANCELED 05/06/2023    HGB CANCELED 05/06/2023    HCT CANCELED 05/06/2023    MCV CANCELED 05/06/2023    PLT CANCELED 05/06/2023       Assessment/Plan   Problem List Items Addressed This Visit          Nervous    Epilepsy (CMS/HCC)       Circulatory    Osler hemorrhagic telangiectasia syndrome (CMS/HCC)       Digestive    Ulcerative colitis (CMS/HCC)       Immune    Psoriasis, guttate       Other    Lupus (CMS/HCC)    Bipolar 1 disorder (CMS/HCC)   Virtual Visit - Audio and Visual Communication Real Time     Continue current medications and therapy for chronic medical conditions    I have personally reviewed the OARRS report with the patient and have considered the risk of abuse, addiction, dependence and diversion.    Patient's use of medication is allowing patient to be able to perform ADL's. Patient is always being evaluated for the possibility of lowering the medication dosage.

## 2023-05-16 ENCOUNTER — OFFICE VISIT (OUTPATIENT)
Dept: PRIMARY CARE | Facility: CLINIC | Age: 28
End: 2023-05-16
Payer: COMMERCIAL

## 2023-05-16 ENCOUNTER — APPOINTMENT (OUTPATIENT)
Dept: PRIMARY CARE | Facility: CLINIC | Age: 28
End: 2023-05-16
Payer: COMMERCIAL

## 2023-05-16 VITALS
SYSTOLIC BLOOD PRESSURE: 116 MMHG | HEART RATE: 115 BPM | BODY MASS INDEX: 19.81 KG/M2 | HEIGHT: 64 IN | WEIGHT: 116 LBS | OXYGEN SATURATION: 98 % | DIASTOLIC BLOOD PRESSURE: 64 MMHG

## 2023-05-16 DIAGNOSIS — L40.9 PSORIASIS OF SCALP: ICD-10-CM

## 2023-05-16 DIAGNOSIS — F41.1 GENERALIZED ANXIETY DISORDER: ICD-10-CM

## 2023-05-16 DIAGNOSIS — L40.4 PSORIASIS, GUTTATE: ICD-10-CM

## 2023-05-16 DIAGNOSIS — M32.9 LUPUS (MULTI): ICD-10-CM

## 2023-05-16 DIAGNOSIS — J30.1 SEASONAL ALLERGIC RHINITIS DUE TO POLLEN: Primary | ICD-10-CM

## 2023-05-16 DIAGNOSIS — G43.111 INTRACTABLE MIGRAINE WITH AURA WITH STATUS MIGRAINOSUS: ICD-10-CM

## 2023-05-16 DIAGNOSIS — R10.9 ABDOMINAL PAIN, UNSPECIFIED ABDOMINAL LOCATION: ICD-10-CM

## 2023-05-16 DIAGNOSIS — G40.909 NONINTRACTABLE EPILEPSY WITHOUT STATUS EPILEPTICUS, UNSPECIFIED EPILEPSY TYPE (MULTI): ICD-10-CM

## 2023-05-16 DIAGNOSIS — I78.0 OSLER HEMORRHAGIC TELANGIECTASIA SYNDROME (CMS-HCC): ICD-10-CM

## 2023-05-16 PROCEDURE — 99214 OFFICE O/P EST MOD 30 MIN: CPT | Performed by: FAMILY MEDICINE

## 2023-05-16 RX ORDER — OXYCODONE HYDROCHLORIDE 15 MG/1
15 TABLET, FILM COATED, EXTENDED RELEASE ORAL EVERY 12 HOURS
Qty: 30 TABLET | Refills: 0 | Status: SHIPPED | OUTPATIENT
Start: 2023-05-16 | End: 2023-05-31

## 2023-05-16 RX ORDER — LIDOCAINE 50 MG/G
1 PATCH TOPICAL DAILY
Qty: 30 PATCH | Refills: 2 | Status: SHIPPED | OUTPATIENT
Start: 2023-05-16 | End: 2023-09-25 | Stop reason: SDUPTHER

## 2023-05-16 RX ORDER — RISPERIDONE 0.5 MG/1
0.5 TABLET ORAL DAILY
Qty: 30 TABLET | Refills: 2 | Status: SHIPPED | OUTPATIENT
Start: 2023-05-16 | End: 2023-06-24 | Stop reason: SDUPTHER

## 2023-05-16 NOTE — PROGRESS NOTES
Subjective   Patient ID: Pretty Devine is a 28 y.o. female who presents for Abdominal Pain.    HPI     Patient is following up for chronic right quadrant abdominal pain. She rates her pain 8/10 today. She would like to discuss switching oxycodone to extended release.      Review of Systems  12 Systems have been reviewed as follows.  Constitutional: Fever, weight gain, weight loss, appetite change, night sweats, fatigue, chills.  Eyes : blurry, double vision, vision, loss, tearing, redness, pain, sensitivity to light, glaucoma.  Ears, nose, mouth, and throat: Hearing loss, ringing in the ears, ear pain, nasal congestion, nasal drainage, nosebleeds, mouth, throat, irritation tooth problem.  Cardiovascular :chest pain, pressure, heart racing, palpitations, sweating, leg swelling, high or low blood pressure  Pulmonary: Cough, yellow or green sputum, blood and sputum, shortness of breath, wheezing  Gastrointestinal: Nausea, vomiting, diarrhea, constipation, pain, blood in stool, or vomitus, heartburn, difficulty swallowing  Genitourinary: incontinence, abnormal bleeding, abnormal discharge, urinary frequency, urinary hesitancy, pain, impotence sexual problem, infection, urinary retention  Musculoskeletal: Pain, stiffness, joint, redness or warmth, arthritis, back pain, weakness, muscle wasting, sprain or fracture  Neuro: Weight weakness, dizziness, change in voice, change in taste change in vision, change in hearing, loss, or change of sensation, trouble walking, balance problems coordination problems, shaking, speech problem  Endocrine , cold or heat intolerance, blood sugar problem, weight gain or loss missed periods hot flashes, sweats, change in body hair, change in libido, increased thirst, increased urination  Heme/lymph: Swelling, bleeding, problem anemia, bruising, enlarged lymph nodes  Allergic/immunologic: H. plus nasal drip, watery itchy eyes, nasal drainage, immunosuppressed  The above were reviewed and  "noted negative except as noted in HPI and Problem List.    Objective   /64 (BP Location: Left arm, Patient Position: Sitting)   Pulse (!) 115   Ht 1.626 m (5' 4\")   Wt 52.6 kg (116 lb)   SpO2 98%   BMI 19.91 kg/m²     Physical Exam  Constitutional: Well developed, well nourished, alert and in no acute distress   Eyes: Normal external exam. Pupils equally round and reactive to light with normal accommodation and extraocular movements intact.  Neck: Supple, no lymphadenopathy or masses.   Cardiovascular: Regular rate and rhythm, normal S1 and S2, no murmurs, gallops, or rubs. Radial pulses normal. No peripheral edema.  Pulmonary: No respiratory distress, lungs clear to auscultation bilaterally. No wheezes, rhonchi, rales.  Abdomen: soft,non tender, non distended, without masses or HSM  Skin: Warm, well perfused, normal skin turgor and color.   Neurologic: Cranial nerves II-XII grossly intact.   Psychiatric: Mood calm and affect normal  Musculoskeletal: Moving all extremities without restriction    Assessment/Plan   Problem List Items Addressed This Visit          Nervous    Epilepsy (CMS/HCC)    Relevant Orders    Follow Up In [facility] - PCP    Abdominal pain    Relevant Medications    lidocaine (Lidoderm) 5 % patch    oxyCODONE ER (OxyCONTIN) 15 mg 12 hr tablet       Circulatory    Osler hemorrhagic telangiectasia syndrome (CMS/HCC)    Relevant Orders    Follow Up In [facility] - PCP       Immune    Psoriasis, guttate       Other    Allergic rhinitis - Primary    Relevant Orders    Follow Up In [facility] - PCP    Anxiety disorder    Relevant Medications    risperiDONE (RisperDAL) 0.5 mg tablet    Other Relevant Orders    Follow Up In [facility] - PCP    Intractable migraine with aura with status migrainosus    Lupus (CMS/HCC)    Relevant Orders    Follow Up In [facility] - PCP    Psoriasis of scalp   Claritin every day prn    Continue current " medications and therapy for chronic medical conditions    Patient's use of medication is allowing patient to be able to perform ADL's. Patient is always being evaluated for the possibility of lowering the medication dosage.    I have personally reviewed the OARRS report with the patient and have considered the risk of abuse, addiction, dependence and diversion.      See last visit  roderick

## 2023-05-23 ENCOUNTER — PATIENT OUTREACH (OUTPATIENT)
Dept: PRIMARY CARE | Facility: CLINIC | Age: 28
End: 2023-05-23
Payer: COMMERCIAL

## 2023-05-23 NOTE — PROGRESS NOTES
Call regarding appt. with PCP on (5/11/23) after hospitalization.  At time of outreach call the patient feels as if their condition has (improved) since last visit.  Reviewed the PCP appointment with the pt and addressed any questions or concerns.

## 2023-05-25 ENCOUNTER — PATIENT MESSAGE (OUTPATIENT)
Dept: PRIMARY CARE | Facility: CLINIC | Age: 28
End: 2023-05-25
Payer: COMMERCIAL

## 2023-05-25 DIAGNOSIS — R25.2 CRAMP OF BOTH LOWER EXTREMITIES: ICD-10-CM

## 2023-05-25 DIAGNOSIS — E78.5 HYPERLIPIDEMIA, UNSPECIFIED HYPERLIPIDEMIA TYPE: ICD-10-CM

## 2023-05-25 RX ORDER — LANOLIN ALCOHOL/MO/W.PET/CERES
400 CREAM (GRAM) TOPICAL DAILY
Qty: 90 TABLET | Refills: 1 | Status: SHIPPED | OUTPATIENT
Start: 2023-05-25 | End: 2023-11-21

## 2023-05-25 NOTE — TELEPHONE ENCOUNTER
From: Pretty Devine  To: Eric Aguilera MD  Sent: 5/25/2023 3:36 AM EDT  Subject: Potassium    Can you ask Dr Aguilera to put in labs to have my potassium and basic CBC and liver enzymes tested ? I keep having bad leg cramps all week, so I just want to make sure my potassium is okay because sometimes I do get low!  Thank you and let me know what he says!  Pretty Devine  [phone]

## 2023-05-30 ENCOUNTER — TELEMEDICINE (OUTPATIENT)
Dept: PRIMARY CARE | Facility: CLINIC | Age: 28
End: 2023-05-30
Payer: COMMERCIAL

## 2023-05-30 DIAGNOSIS — G40.909 NONINTRACTABLE EPILEPSY WITHOUT STATUS EPILEPTICUS, UNSPECIFIED EPILEPSY TYPE (MULTI): ICD-10-CM

## 2023-05-30 DIAGNOSIS — R10.84 GENERALIZED ABDOMINAL PAIN: Primary | ICD-10-CM

## 2023-05-30 DIAGNOSIS — F41.1 GENERALIZED ANXIETY DISORDER: ICD-10-CM

## 2023-05-30 DIAGNOSIS — I78.0 OSLER HEMORRHAGIC TELANGIECTASIA SYNDROME (CMS-HCC): ICD-10-CM

## 2023-05-30 DIAGNOSIS — J30.1 SEASONAL ALLERGIC RHINITIS DUE TO POLLEN: ICD-10-CM

## 2023-05-30 DIAGNOSIS — M32.9 LUPUS (MULTI): ICD-10-CM

## 2023-05-30 PROBLEM — H90.3 ASYMMETRICAL SENSORINEURAL HEARING LOSS: Status: ACTIVE | Noted: 2023-05-30

## 2023-05-30 PROBLEM — R07.89 ATYPICAL CHEST PAIN: Status: ACTIVE | Noted: 2023-05-30

## 2023-05-30 PROBLEM — E87.6 HYPOKALEMIA: Status: ACTIVE | Noted: 2018-12-07

## 2023-05-30 PROBLEM — F32.9 MAJOR DEPRESSIVE DISORDER, SINGLE EPISODE, UNSPECIFIED: Status: ACTIVE | Noted: 2019-03-29

## 2023-05-30 PROBLEM — R00.0 TACHYCARDIA: Status: ACTIVE | Noted: 2023-05-30

## 2023-05-30 PROBLEM — G43.909 MIGRAINE WITHOUT STATUS MIGRAINOSUS, NOT INTRACTABLE: Status: ACTIVE | Noted: 2018-12-14

## 2023-05-30 PROBLEM — R00.2 PALPITATIONS: Status: ACTIVE | Noted: 2023-05-30

## 2023-05-30 PROBLEM — R26.2 DIFFICULTY WALKING: Status: ACTIVE | Noted: 2017-01-24

## 2023-05-30 PROBLEM — M19.90 INFLAMMATORY ARTHRITIS: Status: ACTIVE | Noted: 2018-10-16

## 2023-05-30 PROBLEM — R06.02 SHORT OF BREATH ON EXERTION: Status: ACTIVE | Noted: 2023-05-30

## 2023-05-30 PROBLEM — E11.65 UNCONTROLLED TYPE 2 DIABETES MELLITUS WITH HYPERGLYCEMIA (MULTI): Status: ACTIVE | Noted: 2020-01-17

## 2023-05-30 PROBLEM — M25.60 JOINT STIFFNESS OF MULTIPLE SITES: Status: ACTIVE | Noted: 2018-10-08

## 2023-05-30 PROBLEM — D50.9 IRON DEFICIENCY ANEMIA: Status: ACTIVE | Noted: 2018-12-07

## 2023-05-30 PROBLEM — R56.9 SEIZURES (MULTI): Status: ACTIVE | Noted: 2017-12-22

## 2023-05-30 PROBLEM — R29.898 RIGHT LEG WEAKNESS: Status: ACTIVE | Noted: 2017-01-03

## 2023-05-30 PROBLEM — K29.71 GASTROINTESTINAL HEMORRHAGE ASSOCIATED WITH GASTRITIS: Status: ACTIVE | Noted: 2018-05-01

## 2023-05-30 PROCEDURE — 99214 OFFICE O/P EST MOD 30 MIN: CPT | Performed by: FAMILY MEDICINE

## 2023-05-30 RX ORDER — CHOLECALCIFEROL (VITAMIN D3) 50 MCG
2000 TABLET ORAL DAILY
COMMUNITY
Start: 2019-02-12 | End: 2024-01-29 | Stop reason: ALTCHOICE

## 2023-05-30 RX ORDER — ALBUTEROL SULFATE 0.83 MG/ML
3 SOLUTION RESPIRATORY (INHALATION) EVERY 6 HOURS PRN
COMMUNITY

## 2023-05-30 RX ORDER — CLINDAMYCIN PHOSPHATE 10 UG/ML
LOTION TOPICAL 2 TIMES DAILY
COMMUNITY
Start: 2021-01-13

## 2023-05-30 RX ORDER — HYDROXYCHLOROQUINE SULFATE 200 MG/1
200 TABLET, FILM COATED ORAL DAILY
COMMUNITY
Start: 2018-10-16 | End: 2023-05-30 | Stop reason: ALTCHOICE

## 2023-05-30 RX ORDER — OXYCODONE AND ACETAMINOPHEN 5; 325 MG/1; MG/1
1 TABLET ORAL 2 TIMES DAILY PRN
Qty: 10 TABLET | Refills: 0 | Status: SHIPPED | OUTPATIENT
Start: 2023-05-30 | End: 2023-06-02 | Stop reason: SDUPTHER

## 2023-05-30 RX ORDER — GABAPENTIN 100 MG/1
100 CAPSULE ORAL 2 TIMES DAILY
COMMUNITY
Start: 2020-07-10 | End: 2023-08-01 | Stop reason: ALTCHOICE

## 2023-05-30 NOTE — PROGRESS NOTES
Subjective   Patient ID: Pretty Devine is a 28 y.o. female who presents for Follow-up.    Pt would like to discuss medication option.     She would like to began percocet again instead of the oxycodone. For stomach issue.          Review of Systems  12 Systems have been reviewed as follows.  Constitutional: Fever, weight gain, weight loss, appetite change, night sweats, fatigue, chills.  Eyes : blurry, double vision, vision, loss, tearing, redness, pain, sensitivity to light, glaucoma.  Ears, nose, mouth, and throat: Hearing loss, ringing in the ears, ear pain, nasal congestion, nasal drainage, nosebleeds, mouth, throat, irritation tooth problem.  Cardiovascular :chest pain, pressure, heart racing, palpitations, sweating, leg swelling, high or low blood pressure  Pulmonary: Cough, yellow or green sputum, blood and sputum, shortness of breath, wheezing  Gastrointestinal: Nausea, vomiting, diarrhea, constipation, pain, blood in stool, or vomitus, heartburn, difficulty swallowing  Genitourinary: incontinence, abnormal bleeding, abnormal discharge, urinary frequency, urinary hesitancy, pain, impotence sexual problem, infection, urinary retention  Musculoskeletal: Pain, stiffness, joint, redness or warmth, arthritis, back pain, weakness, muscle wasting, sprain or fracture  Neuro: Weight weakness, dizziness, change in voice, change in taste change in vision, change in hearing, loss, or change of sensation, trouble walking, balance problems coordination problems, shaking, speech problem  Endocrine , cold or heat intolerance, blood sugar problem, weight gain or loss missed periods hot flashes, sweats, change in body hair, change in libido, increased thirst, increased urination  Heme/lymph: Swelling, bleeding, problem anemia, bruising, enlarged lymph nodes  Allergic/immunologic: H. plus nasal drip, watery itchy eyes, nasal drainage, immunosuppressed  The above were reviewed and noted negative except as noted in HPI and  Problem List.    Objective   There were no vitals taken for this visit.    Physical Exam  Constitutional: Well developed, well nourished, alert and in no acute distress   Eyes: Normal external exam. Pupils equally round and reactive to light with normal accommodation and extraocular movements intact.  Neck: Supple, no lymphadenopathy or masses.   Cardiovascular: Regular rate and rhythm, normal S1 and S2, no murmurs, gallops, or rubs. Radial pulses normal. No peripheral edema.  Pulmonary: No respiratory distress, lungs clear to auscultation bilaterally. No wheezes, rhonchi, rales.  Abdomen: soft,non tender, non distended, without masses or HSM  Skin: Warm, well perfused, normal skin turgor and color.   Neurologic: Cranial nerves II-XII grossly intact.   Psychiatric: Mood calm and affect normal  Musculoskeletal: Moving all extremities without restriction    Assessment/Plan   Problem List Items Addressed This Visit          Nervous    Epilepsy (CMS/HCC)    Relevant Orders    Follow Up In [facility] - PCP    Abdominal pain - Primary    Relevant Medications    oxyCODONE-acetaminophen (Percocet) 5-325 mg tablet       Circulatory    Osler hemorrhagic telangiectasia syndrome (CMS/HCC)    Relevant Medications    oxyCODONE-acetaminophen (Percocet) 5-325 mg tablet    Other Relevant Orders    Follow Up In [facility] - PCP       Other    Allergic rhinitis    Relevant Orders    Follow Up In [facility] - PCP    Anxiety disorder    Relevant Orders    Follow Up In [facility] - PCP    Lupus (CMS/[facility])    Relevant Orders    Follow Up In [facility] - PCP     Continue current medications and therapy for chronic medical conditions    Virtual Visit - Audio and Visual Communication Real Time     Patient's use of medication is allowing patient to be able to perform ADL's. Patient is always being evaluated for the possibility of lowering the medication dosage.    I have personally  reviewed the OARRS report with the patient and have considered the risk of abuse, addiction, dependence and diversion.    Percocet short acting #10     Cannot tolerate percocet ER    See last visit in allscripts

## 2023-06-02 ENCOUNTER — TELEMEDICINE (OUTPATIENT)
Dept: PRIMARY CARE | Facility: CLINIC | Age: 28
End: 2023-06-02
Payer: COMMERCIAL

## 2023-06-02 DIAGNOSIS — F33.41 RECURRENT MAJOR DEPRESSIVE DISORDER, IN PARTIAL REMISSION (CMS-HCC): ICD-10-CM

## 2023-06-02 DIAGNOSIS — F31.9 BIPOLAR 1 DISORDER (MULTI): ICD-10-CM

## 2023-06-02 DIAGNOSIS — F41.1 GENERALIZED ANXIETY DISORDER: ICD-10-CM

## 2023-06-02 DIAGNOSIS — I78.0 OSLER HEMORRHAGIC TELANGIECTASIA SYNDROME (CMS-HCC): ICD-10-CM

## 2023-06-02 DIAGNOSIS — G40.909 NONINTRACTABLE EPILEPSY WITHOUT STATUS EPILEPTICUS, UNSPECIFIED EPILEPSY TYPE (MULTI): Primary | ICD-10-CM

## 2023-06-02 DIAGNOSIS — R10.84 GENERALIZED ABDOMINAL PAIN: ICD-10-CM

## 2023-06-02 DIAGNOSIS — K29.01 GASTROINTESTINAL HEMORRHAGE ASSOCIATED WITH ACUTE GASTRITIS: ICD-10-CM

## 2023-06-02 PROCEDURE — 99214 OFFICE O/P EST MOD 30 MIN: CPT | Performed by: FAMILY MEDICINE

## 2023-06-02 RX ORDER — OXYCODONE AND ACETAMINOPHEN 5; 325 MG/1; MG/1
1 TABLET ORAL EVERY 8 HOURS PRN
Qty: 45 TABLET | Refills: 0 | Status: SHIPPED | OUTPATIENT
Start: 2023-06-02 | End: 2023-06-15 | Stop reason: SDUPTHER

## 2023-06-02 NOTE — PROGRESS NOTES
Subjective   Patient ID: Pretty Devine is a 28 y.o. female who presents for Fibromyalgia (Patient states the percocet is working better for pain management than the oxycontin. States the pain is tolerable and requesting refill.).    HPI     Review of Systems  12 Systems have been reviewed as follows.  Constitutional: Fever, weight gain, weight loss, appetite change, night sweats, fatigue, chills.  Eyes : blurry, double vision, vision, loss, tearing, redness, pain, sensitivity to light, glaucoma.  Ears, nose, mouth, and throat: Hearing loss, ringing in the ears, ear pain, nasal congestion, nasal drainage, nosebleeds, mouth, throat, irritation tooth problem.  Cardiovascular :chest pain, pressure, heart racing, palpitations, sweating, leg swelling, high or low blood pressure  Pulmonary: Cough, yellow or green sputum, blood and sputum, shortness of breath, wheezing  Gastrointestinal: Nausea, vomiting, diarrhea, constipation, pain, blood in stool, or vomitus, heartburn, difficulty swallowing  Genitourinary: incontinence, abnormal bleeding, abnormal discharge, urinary frequency, urinary hesitancy, pain, impotence sexual problem, infection, urinary retention  Musculoskeletal: Pain, stiffness, joint, redness or warmth, arthritis, back pain, weakness, muscle wasting, sprain or fracture  Neuro: Weight weakness, dizziness, change in voice, change in taste change in vision, change in hearing, loss, or change of sensation, trouble walking, balance problems coordination problems, shaking, speech problem  Endocrine , cold or heat intolerance, blood sugar problem, weight gain or loss missed periods hot flashes, sweats, change in body hair, change in libido, increased thirst, increased urination  Heme/lymph: Swelling, bleeding, problem anemia, bruising, enlarged lymph nodes  Allergic/immunologic: H. plus nasal drip, watery itchy eyes, nasal drainage, immunosuppressed  The above were reviewed and noted negative except as noted in  HPI and Problem List.      Objective   There were no vitals taken for this visit.    Physical Exam  Constitutional: Well developed, well nourished, alert and in no acute distress   Psychiatric: Mood calm and affect normal      Assessment/Plan   Problem List Items Addressed This Visit          Nervous    Epilepsy (CMS/HCC) - Primary    Relevant Orders    Follow Up In [facility] - PCP    Abdominal pain    Relevant Medications    oxyCODONE-acetaminophen (Percocet) 5-325 mg tablet    Other Relevant Orders    Follow Up In [facility] - PCP       Circulatory    Osler hemorrhagic telangiectasia syndrome (CMS/HCC)    Relevant Medications    oxyCODONE-acetaminophen (Percocet) 5-325 mg tablet    Other Relevant Orders    Follow Up In [facility] - PCP       Digestive    Gastrointestinal hemorrhage associated with gastritis    Relevant Orders    Follow Up In [facility] - PCP       Other    Anxiety disorder    Relevant Orders    Follow Up In [facility] - PCP    Depression    Relevant Orders    Follow Up In [facility] - PCP    Bipolar 1 disorder (CMS/[facility])    Relevant Orders    Follow Up In [facility] - PCP     Sees Dr. Sergio johnson    Virtual Visit - Audio and Visual Communication Real Time     I have personally reviewed the OARRS report with the patient and have considered the risk of abuse, addiction, dependence and diversion.    Patient's use of medication is allowing patient to be able to perform ADL's. Patient is always being evaluated for the possibility of lowering the medication dosage.    Continue current medications and therapy for chronic medical conditions    Sees Dr. Mast & Dipti & Tulio also      See last visit all scripts

## 2023-06-07 ENCOUNTER — PATIENT MESSAGE (OUTPATIENT)
Dept: PRIMARY CARE | Facility: CLINIC | Age: 28
End: 2023-06-07
Payer: COMMERCIAL

## 2023-06-07 DIAGNOSIS — R11.2 NAUSEA AND VOMITING, UNSPECIFIED VOMITING TYPE: ICD-10-CM

## 2023-06-07 NOTE — TELEPHONE ENCOUNTER
From: Pretty Devine  To: Eric Aguilera MD  Sent: 6/7/2023 2:32 AM EDT  Subject: Medication refill    Can I please get a refill on my ondansetron 8 MG dissolving tablets please ! I use [facility] in [place]!  Thank you  Pretty Devine

## 2023-06-08 RX ORDER — ONDANSETRON 8 MG/1
8 TABLET, ORALLY DISINTEGRATING ORAL EVERY 8 HOURS PRN
Qty: 20 TABLET | Refills: 1 | Status: SHIPPED | OUTPATIENT
Start: 2023-06-08

## 2023-06-15 ENCOUNTER — TELEMEDICINE (OUTPATIENT)
Dept: PRIMARY CARE | Facility: CLINIC | Age: 28
End: 2023-06-15
Payer: COMMERCIAL

## 2023-06-15 DIAGNOSIS — R10.84 GENERALIZED ABDOMINAL PAIN: ICD-10-CM

## 2023-06-15 DIAGNOSIS — G40.909 NONINTRACTABLE EPILEPSY WITHOUT STATUS EPILEPTICUS, UNSPECIFIED EPILEPSY TYPE (MULTI): Primary | ICD-10-CM

## 2023-06-15 DIAGNOSIS — I78.0 OSLER HEMORRHAGIC TELANGIECTASIA SYNDROME (CMS-HCC): ICD-10-CM

## 2023-06-15 DIAGNOSIS — F31.9 BIPOLAR 1 DISORDER (MULTI): ICD-10-CM

## 2023-06-15 DIAGNOSIS — D50.8 OTHER IRON DEFICIENCY ANEMIA: ICD-10-CM

## 2023-06-15 DIAGNOSIS — J30.1 SEASONAL ALLERGIC RHINITIS DUE TO POLLEN: ICD-10-CM

## 2023-06-15 PROBLEM — E11.65 UNCONTROLLED TYPE 2 DIABETES MELLITUS WITH HYPERGLYCEMIA (MULTI): Status: RESOLVED | Noted: 2020-01-17 | Resolved: 2023-06-15

## 2023-06-15 PROCEDURE — 99214 OFFICE O/P EST MOD 30 MIN: CPT | Performed by: FAMILY MEDICINE

## 2023-06-15 RX ORDER — OXYCODONE AND ACETAMINOPHEN 5; 325 MG/1; MG/1
1 TABLET ORAL EVERY 8 HOURS PRN
Qty: 45 TABLET | Refills: 0 | Status: SHIPPED | OUTPATIENT
Start: 2023-06-15 | End: 2023-06-28 | Stop reason: SDUPTHER

## 2023-06-15 ASSESSMENT — ENCOUNTER SYMPTOMS: ABDOMINAL PAIN: 1

## 2023-06-15 NOTE — PROGRESS NOTES
Subjective   Patient ID: Pretty Devine is a 28 y.o. female who presents for Abdominal Pain.    Abdominal Pain  This is a recurrent problem. The current episode started more than 1 month ago. The onset quality is gradual. The problem occurs constantly. The problem has been unchanged. The pain is located in the generalized abdominal region. The abdominal pain does not radiate. Treatments tried: Oxycodone acetaminophen. The treatment provided mild relief.        Review of Systems   Gastrointestinal:  Positive for abdominal pain.   Constitutional: Fever, weight gain, weight loss, appetite change, night sweats, fatigue, chills.  Eyes : blurry, double vision, vision, loss, tearing, redness, pain, sensitivity to light, glaucoma.  Ears: nose, mouth, and throat: Hearing loss, ringing in the ears, ear pain, nasal congestion, nasal drainage, nosebleeds, mouth, throat, irritation tooth problem.  Cardiovascular: chest pain, pressure, heart racing, palpitations, sweating, leg swelling, high or low blood pressure  Pulmonary: Cough, yellow or green sputum, blood and sputum, shortness of breath, wheezing  Gastrointestinal: Nausea, vomiting, diarrhea, constipation, pain, blood in stool, or vomitus, heartburn, difficulty swallowing  Genitourinary: incontinence, abnormal bleeding, abnormal discharge, urinary frequency, urinary hesitancy, pain, impotence sexual problem, infection, urinary retention  Musculoskeletal: Pain, stiffness, joint, redness or warmth, arthritis, back pain, weakness, muscle wasting, sprain or fracture  Neuro: Weight weakness, dizziness, change in voice, change in taste change in vision, change in hearing, loss, or change of sensation, trouble walking, balance problems coordination problems, shaking, speech problem  Endocrine: cold or heat intolerance, blood sugar problem, weight gain or loss missed periods hot flashes, sweats, change in body hair, change in libido, increased thirst, increased  urination  Heme/lymph: Swelling, bleeding, problem anemia, bruising, enlarged lymph nodes  Allergic/immunologic: H. plus nasal drip, watery itchy eyes, nasal drainage, immunosuppressed  The above were reviewed and noted negative except as noted in HPI and Problem List.     Objective   There were no vitals taken for this visit.    Physical Exam  Constitutional: Well developed, well nourished, alert and in no acute distress   Psychiatric: Mood calm and affect normal  Musculoskeletal: Moving all extremities without restriction     Assessment/Plan   Problem List Items Addressed This Visit          Nervous    Epilepsy (CMS/HCC) - Primary    Relevant Orders    Follow Up In [facility] - PCP    Abdominal pain    Relevant Medications    oxyCODONE-acetaminophen (Percocet) 5-325 mg tablet    Other Relevant Orders    Follow Up In [facility] - PCP       Circulatory    Osler hemorrhagic telangiectasia syndrome (CMS/HCC)    Relevant Medications    oxyCODONE-acetaminophen (Percocet) 5-325 mg tablet    Other Relevant Orders    Follow Up In [facility] - PCP       Hematologic    Iron deficiency anemia    Relevant Orders    Follow Up In [facility] - PCP       Other    Allergic rhinitis    Relevant Orders    Follow Up In [facility] - PCP    Bipolar 1 disorder (CMS/HCC)    Relevant Orders    Follow Up In [facility] - PCP     Patient was advised importance of proper diet/nutrition in addition adequate hydration. Patient was encouraged moderate exercise program to include 30 minutes daily for 5 days of the week or 150 minutes weekly. Patient will follow-up with us as scheduled.     I have personally reviewed the OARRS report with the patient and have considered the risk of abuse, addiction, dependence and diversion.     Patient's use of medication is allowing patient to be able to perform  ADL's. Patient is always being evaluated for the possibility of lowering the  medication dosage.     continue all current medications and therapy for chronic medical conditions     Sees Dr. Mast & Dipti & Tulio also     See  Dr. Hill 8/22/23 hepatologist    UDS & JEANMARIE next    Virtual Visit - Audio and Visual Communication Real Time       Scribe Attestation  By signing my name below, I, Denia Craven MA , Scribe   attest that this documentation has been prepared under the direction and in the presence of Eric Aguilera MD.     Provider Attestation - Scribe documentation    All medical record entries made by the Scribe were at my direction and personally dictated by me. I have reviewed the chart and agree that the record accurately reflects my personal performance of the history, physical exam, discussion and plan.

## 2023-06-16 ENCOUNTER — APPOINTMENT (OUTPATIENT)
Dept: PRIMARY CARE | Facility: CLINIC | Age: 28
End: 2023-06-16
Payer: COMMERCIAL

## 2023-06-18 ENCOUNTER — PATIENT MESSAGE (OUTPATIENT)
Dept: PRIMARY CARE | Facility: CLINIC | Age: 28
End: 2023-06-18
Payer: COMMERCIAL

## 2023-06-18 DIAGNOSIS — G89.4 CHRONIC PAIN SYNDROME: ICD-10-CM

## 2023-06-18 DIAGNOSIS — M79.7 FIBROMYALGIA: ICD-10-CM

## 2023-06-19 NOTE — TELEPHONE ENCOUNTER
From: Pretty Devine  To: Eric Aguilera MD  Sent: 6/18/2023 2:50 PM EDT  Subject: Refill    I need a refill of pregabalin 75 mg sent to [facility] in [place] please

## 2023-06-20 RX ORDER — PREGABALIN 75 MG/1
75 CAPSULE ORAL 2 TIMES DAILY
Qty: 60 CAPSULE | Refills: 0 | Status: SHIPPED | OUTPATIENT
Start: 2023-06-20 | End: 2023-09-08 | Stop reason: SDUPTHER

## 2023-06-23 ENCOUNTER — TELEMEDICINE (OUTPATIENT)
Dept: PRIMARY CARE | Facility: CLINIC | Age: 28
End: 2023-06-23
Payer: COMMERCIAL

## 2023-06-23 DIAGNOSIS — M32.9 LUPUS (MULTI): ICD-10-CM

## 2023-06-23 DIAGNOSIS — R00.2 PALPITATIONS: ICD-10-CM

## 2023-06-23 DIAGNOSIS — K29.01 GASTROINTESTINAL HEMORRHAGE ASSOCIATED WITH ACUTE GASTRITIS: ICD-10-CM

## 2023-06-23 DIAGNOSIS — F32.4 MAJOR DEPRESSIVE DISORDER WITH SINGLE EPISODE, IN PARTIAL REMISSION (CMS-HCC): ICD-10-CM

## 2023-06-23 DIAGNOSIS — G43.111 INTRACTABLE MIGRAINE WITH AURA WITH STATUS MIGRAINOSUS: ICD-10-CM

## 2023-06-23 DIAGNOSIS — R56.9 SEIZURES (MULTI): Primary | ICD-10-CM

## 2023-06-23 DIAGNOSIS — I78.0 OSLER HEMORRHAGIC TELANGIECTASIA SYNDROME (CMS-HCC): ICD-10-CM

## 2023-06-23 DIAGNOSIS — F41.1 GENERALIZED ANXIETY DISORDER: ICD-10-CM

## 2023-06-23 DIAGNOSIS — F31.9 BIPOLAR 1 DISORDER (MULTI): ICD-10-CM

## 2023-06-23 PROCEDURE — 99214 OFFICE O/P EST MOD 30 MIN: CPT | Performed by: FAMILY MEDICINE

## 2023-06-23 NOTE — PROGRESS NOTES
Subjective   Patient ID: Pretty Devine is a 28 y.o. female who presents for Anxiety.    HPI patient called in office for anxiety medications. Patient needs to discuss her anxiety meds because they seem not be working.  Patient has concerns Needs a different prn medication  for panic attacks within the past 2 weeks she has had 2 panic attacks. Please call by 6-6:30 pm.    Review of Systems  12 Systems have been reviewed as follows.  Constitutional: Fever, weight gain, weight loss, appetite change, night sweats, fatigue, chills.  Eyes : blurry, double vision, vision, loss, tearing, redness, pain, sensitivity to light, glaucoma.  Ears, nose, mouth, and throat: Hearing loss, ringing in the ears, ear pain, nasal congestion, nasal drainage, nosebleeds, mouth, throat, irritation tooth problem.  Cardiovascular :chest pain, pressure, heart racing, palpitations, sweating, leg swelling, high or low blood pressure  Pulmonary: Cough, yellow or green sputum, blood and sputum, shortness of breath, wheezing  Gastrointestinal: Nausea, vomiting, diarrhea, constipation, pain, blood in stool, or vomitus, heartburn, difficulty swallowing  Genitourinary: incontinence, abnormal bleeding, abnormal discharge, urinary frequency, urinary hesitancy, pain, impotence sexual problem, infection, urinary retention  Musculoskeletal: Pain, stiffness, joint, redness or warmth, arthritis, back pain, weakness, muscle wasting, sprain or fracture  Neuro: Weight weakness, dizziness, change in voice, change in taste change in vision, change in hearing, loss, or change of sensation, trouble walking, balance problems coordination problems, shaking, speech problem  Endocrine , cold or heat intolerance, blood sugar problem, weight gain or loss missed periods hot flashes, sweats, change in body hair, change in libido, increased thirst, increased urination  Heme/lymph: Swelling, bleeding, problem anemia, bruising, enlarged lymph nodes  Allergic/immunologic: H.  plus nasal drip, watery itchy eyes, nasal drainage, immunosuppressed  The above were reviewed and noted negative except as noted in HPI and Problem List.    Objective   There were no vitals taken for this visit.    Physical Exam  Constitutional: Well developed, well nourished, alert and in no acute distress     Assessment/Plan   Problem List Items Addressed This Visit       Anxiety disorder    Relevant Medications    citalopram (CeleXA) 10 mg tablet    risperiDONE (RisperDAL) 1 mg tablet    Other Relevant Orders    Follow Up In [facility] - PCP    Intractable migraine with aura with status migrainosus    Relevant Orders    Follow Up In [facility] - PCP    Lupus (CMS/HCC)    Relevant Orders    Follow Up In [facility] - PCP    Osler hemorrhagic telangiectasia syndrome (CMS/[facility])    Relevant Orders    Follow Up In [facility] - PCP    Bipolar 1 disorder (CMS/[facility])    Relevant Orders    Follow Up In [facility] - PCP    Gastrointestinal hemorrhage associated with gastritis    Relevant Orders    Follow Up In [facility] - PCP    Palpitations    Relevant Orders    Follow Up In [facility] - PCP    Major depressive disorder, single episode, unspecified    Relevant Orders    Follow Up In [facility] - PCP    Seizures (CMS/[facility]) - Primary    Relevant Orders    Follow Up In [facility] - PCP   Virtual Visit - Audio and Visual Communication Real Time     Continue current medications and therapy for chronic medical conditions    Patient's use of medication is allowing patient to be able to perform ADL's. Patient is always being evaluated for the possibility of lowering the medication dosage.    I have personally reviewed the OARRS report with the patient and have considered the risk of abuse, addiction, dependence and diversion.    UDS & BW next    See visit 6/15

## 2023-06-24 RX ORDER — CITALOPRAM 10 MG/1
10 TABLET ORAL DAILY
Qty: 30 TABLET | Refills: 1 | Status: SHIPPED | OUTPATIENT
Start: 2023-06-24 | End: 2023-09-14 | Stop reason: SDUPTHER

## 2023-06-24 RX ORDER — RISPERIDONE 1 MG/1
0.5 TABLET ORAL DAILY
Qty: 30 TABLET | Refills: 1 | Status: SHIPPED | OUTPATIENT
Start: 2023-06-24 | End: 2023-08-01 | Stop reason: SDUPTHER

## 2023-06-28 ENCOUNTER — TELEMEDICINE (OUTPATIENT)
Dept: PRIMARY CARE | Facility: CLINIC | Age: 28
End: 2023-06-28
Payer: COMMERCIAL

## 2023-06-28 ENCOUNTER — PATIENT MESSAGE (OUTPATIENT)
Dept: PRIMARY CARE | Facility: CLINIC | Age: 28
End: 2023-06-28

## 2023-06-28 DIAGNOSIS — E78.2 MIXED HYPERLIPIDEMIA: ICD-10-CM

## 2023-06-28 DIAGNOSIS — K21.00 GASTROESOPHAGEAL REFLUX DISEASE WITH ESOPHAGITIS WITHOUT HEMORRHAGE: ICD-10-CM

## 2023-06-28 DIAGNOSIS — L03.313 CELLULITIS OF CHEST WALL: ICD-10-CM

## 2023-06-28 DIAGNOSIS — Q27.30 AVM (ARTERIOVENOUS MALFORMATION) (HHS-HCC): Primary | ICD-10-CM

## 2023-06-28 DIAGNOSIS — R10.84 GENERALIZED ABDOMINAL PAIN: ICD-10-CM

## 2023-06-28 DIAGNOSIS — G40.009 PARTIAL IDIOPATHIC EPILEPSY WITH SEIZURES OF LOCALIZED ONSET, NOT INTRACTABLE, WITHOUT STATUS EPILEPTICUS (MULTI): ICD-10-CM

## 2023-06-28 DIAGNOSIS — I78.0 OSLER HEMORRHAGIC TELANGIECTASIA SYNDROME (CMS-HCC): ICD-10-CM

## 2023-06-28 DIAGNOSIS — R00.2 PALPITATIONS: ICD-10-CM

## 2023-06-28 DIAGNOSIS — M32.9 LUPUS (MULTI): ICD-10-CM

## 2023-06-28 DIAGNOSIS — J30.1 SEASONAL ALLERGIC RHINITIS DUE TO POLLEN: ICD-10-CM

## 2023-06-28 PROBLEM — R16.2 HEPATOSPLENOMEGALY: Status: ACTIVE | Noted: 2023-06-28

## 2023-06-28 PROBLEM — K51.90 ULCERATIVE COLITIS (MULTI): Status: RESOLVED | Noted: 2023-03-07 | Resolved: 2023-06-28

## 2023-06-28 PROBLEM — G62.9 CRANIAL NEUROPATHY: Status: ACTIVE | Noted: 2023-06-28

## 2023-06-28 PROBLEM — K21.9 GERD (GASTROESOPHAGEAL REFLUX DISEASE): Status: ACTIVE | Noted: 2023-06-28

## 2023-06-28 PROBLEM — R20.0 FACIAL NUMBNESS: Status: ACTIVE | Noted: 2023-06-28

## 2023-06-28 PROCEDURE — 99214 OFFICE O/P EST MOD 30 MIN: CPT | Performed by: FAMILY MEDICINE

## 2023-06-28 RX ORDER — ERENUMAB-AOOE 70 MG/ML
70 INJECTION SUBCUTANEOUS
COMMUNITY
Start: 2020-12-28 | End: 2023-08-01 | Stop reason: ALTCHOICE

## 2023-06-28 RX ORDER — LEVETIRACETAM 1000 MG/1
1000 TABLET ORAL DAILY
COMMUNITY
End: 2023-08-01 | Stop reason: ALTCHOICE

## 2023-06-28 NOTE — TELEPHONE ENCOUNTER
PT GOT A NEW SCRIPT OF OXYCODONE 5MG AFTER BEING DISCHARGED FROM THE ER, AND SHE WILL NEED A REFILL OF THIS PLEASE.  [facility] IN [place]

## 2023-06-28 NOTE — PROGRESS NOTES
Subjective   Patient ID: Pretty Devine is a 28 y.o. female who presents for Hospital Follow-up.    Pt was seen at the [facility] on 6/27/23 was discharged 6/28/23  due to chest wall infection. Sx of chest pain , heart rate.     Pt states she is currently in pain. Heart rate at 82, still having some chest pains.                Review of Systems  12 Systems have been reviewed as follows.  Constitutional: Fever, weight gain, weight loss, appetite change, night sweats, fatigue, chills.  Eyes : blurry, double vision, vision, loss, tearing, redness, pain, sensitivity to light, glaucoma.  Ears, nose, mouth, and throat: Hearing loss, ringing in the ears, ear pain, nasal congestion, nasal drainage, nosebleeds, mouth, throat, irritation tooth problem.  Cardiovascular :chest pain, pressure, heart racing, palpitations, sweating, leg swelling, high or low blood pressure  Pulmonary: Cough, yellow or green sputum, blood and sputum, shortness of breath, wheezing  Gastrointestinal: Nausea, vomiting, diarrhea, constipation, pain, blood in stool, or vomitus, heartburn, difficulty swallowing  Genitourinary: incontinence, abnormal bleeding, abnormal discharge, urinary frequency, urinary hesitancy, pain, impotence sexual problem, infection, urinary retention  Musculoskeletal: Pain, stiffness, joint, redness or warmth, arthritis, back pain, weakness, muscle wasting, sprain or fracture  Neuro: Weight weakness, dizziness, change in voice, change in taste change in vision, change in hearing, loss, or change of sensation, trouble walking, balance problems coordination problems, shaking, speech problem  Endocrine , cold or heat intolerance, blood sugar problem, weight gain or loss missed periods hot flashes, sweats, change in body hair, change in libido, increased thirst, increased urination  Heme/lymph: Swelling, bleeding, problem anemia, bruising, enlarged lymph nodes  Allergic/immunologic: H. plus nasal drip, watery itchy eyes, nasal  drainage, immunosuppressed  The above were reviewed and noted negative except as noted in HPI and Problem List.    Objective   There were no vitals taken for this visit.    Physical Exam  Constitutional: Well developed, well nourished, alert and in no acute distress     Assessment/Plan   Problem List Items Addressed This Visit       Allergic rhinitis    Epilepsy (CMS/HCC)    Hyperlipidemia    Lupus (CMS/HCC)    Osler hemorrhagic telangiectasia syndrome (CMS/HCC)    Relevant Medications    oxyCODONE-acetaminophen (Percocet) 5-325 mg tablet    Abdominal pain    Relevant Medications    oxyCODONE-acetaminophen (Percocet) 5-325 mg tablet    AVM (arteriovenous malformation) - Primary    Palpitations    GERD (gastroesophageal reflux disease)    Cellulitis of chest wall   I have personally reviewed the OARRS report with the patient and have considered the risk of abuse, addiction, dependence and diversion.    Patient's use of medication is allowing patient to be able to perform ADL's. Patient is always being evaluated for the possibility of lowering the medication dosage.    Continue current medications and therapy for chronic medical conditions    Virtual Visit - Audio and Visual Communication Real Time     Inc percocet to qid x 5 days    See visit 6/15    UDS & BW next

## 2023-06-29 ENCOUNTER — TELEPHONE (OUTPATIENT)
Dept: PRIMARY CARE | Facility: CLINIC | Age: 28
End: 2023-06-29

## 2023-06-29 ENCOUNTER — PATIENT OUTREACH (OUTPATIENT)
Dept: PRIMARY CARE | Facility: CLINIC | Age: 28
End: 2023-06-29

## 2023-06-29 ENCOUNTER — APPOINTMENT (OUTPATIENT)
Dept: PRIMARY CARE | Facility: CLINIC | Age: 28
End: 2023-06-29
Payer: COMMERCIAL

## 2023-06-29 RX ORDER — GABAPENTIN 100 MG/1
100 CAPSULE ORAL 2 TIMES DAILY
Qty: 90 CAPSULE | Refills: 1 | OUTPATIENT
Start: 2023-06-29

## 2023-06-29 RX ORDER — OXYCODONE AND ACETAMINOPHEN 5; 325 MG/1; MG/1
1 TABLET ORAL EVERY 6 HOURS PRN
Qty: 20 TABLET | Refills: 0 | OUTPATIENT
Start: 2023-06-29 | End: 2023-07-14

## 2023-06-29 RX ORDER — OXYCODONE AND ACETAMINOPHEN 5; 325 MG/1; MG/1
1 TABLET ORAL EVERY 6 HOURS PRN
Qty: 20 TABLET | Refills: 0 | Status: SHIPPED | OUTPATIENT
Start: 2023-06-29 | End: 2023-07-03 | Stop reason: SDUPTHER

## 2023-06-29 NOTE — TELEPHONE ENCOUNTER
PT IS JUST CALLING TO MAKE SURE CB SENDS HER NEW SCRIPT OF OXYCODONE TO HER PHARMACY TODAY PLEASE, WAS DISCUSSED IN VV YESTERDAY.  [facility] IN [place] ON [address]

## 2023-06-29 NOTE — PROGRESS NOTES
Discharge Facility: [facility]  Discharge Diagnosis: Tachycardia  Admission Date: 6/27/23  Discharge Date: 6/28/23    PCP Appointment Date: 7/3/23  Specialist Appointment Date:   -General surgery  -Dr. Mast  Hospital Encounter and Summary: not available at this time   See discharge assessment below for further details    TCM call completed (6/29/23)  Patient is scheduled within 7-14 days of discharge on (7/3/23 ) for hospital follow up, however was unable to reach patient during two business days after discharge despite at least two attempts made.

## 2023-06-29 NOTE — TELEPHONE ENCOUNTER
From: Pretty Devine  To: Eric Aguilera MD  Sent: 6/28/2023 8:14 PM EDT  Subject: Meds    I need my refill of oxycodone 5/325 sent in to [facility] on [address]! It is due Friday but was just in the hospital and they told me to take more than what Dr aguilera normal prescribes for 3 days so I will be out tomorrow! So I need some sent in a day early! If you have any question please call me at [phone]  thank you  Pretty Devine

## 2023-07-03 ENCOUNTER — OFFICE VISIT (OUTPATIENT)
Dept: PRIMARY CARE | Facility: CLINIC | Age: 28
End: 2023-07-03
Payer: COMMERCIAL

## 2023-07-03 VITALS
SYSTOLIC BLOOD PRESSURE: 98 MMHG | WEIGHT: 121 LBS | OXYGEN SATURATION: 98 % | HEART RATE: 78 BPM | HEIGHT: 64 IN | DIASTOLIC BLOOD PRESSURE: 64 MMHG | RESPIRATION RATE: 16 BRPM | BODY MASS INDEX: 20.66 KG/M2

## 2023-07-03 DIAGNOSIS — R53.83 FATIGUE, UNSPECIFIED TYPE: ICD-10-CM

## 2023-07-03 DIAGNOSIS — R10.84 GENERALIZED ABDOMINAL PAIN: ICD-10-CM

## 2023-07-03 DIAGNOSIS — E78.2 MIXED HYPERLIPIDEMIA: ICD-10-CM

## 2023-07-03 DIAGNOSIS — Z79.899 MEDICATION MANAGEMENT: ICD-10-CM

## 2023-07-03 DIAGNOSIS — I78.0 OSLER HEMORRHAGIC TELANGIECTASIA SYNDROME (CMS-HCC): ICD-10-CM

## 2023-07-03 DIAGNOSIS — I95.0 IDIOPATHIC HYPOTENSION: ICD-10-CM

## 2023-07-03 PROCEDURE — 80358 DRUG SCREENING METHADONE: CPT

## 2023-07-03 PROCEDURE — 80307 DRUG TEST PRSMV CHEM ANLYZR: CPT

## 2023-07-03 PROCEDURE — 80354 DRUG SCREENING FENTANYL: CPT

## 2023-07-03 PROCEDURE — 80373 DRUG SCREENING TRAMADOL: CPT

## 2023-07-03 PROCEDURE — 99214 OFFICE O/P EST MOD 30 MIN: CPT | Performed by: FAMILY MEDICINE

## 2023-07-03 PROCEDURE — 80346 BENZODIAZEPINES1-12: CPT

## 2023-07-03 PROCEDURE — 80365 DRUG SCREENING OXYCODONE: CPT

## 2023-07-03 PROCEDURE — 80368 SEDATIVE HYPNOTICS: CPT

## 2023-07-03 PROCEDURE — 80361 OPIATES 1 OR MORE: CPT

## 2023-07-03 RX ORDER — OXYCODONE AND ACETAMINOPHEN 5; 325 MG/1; MG/1
1 TABLET ORAL 3 TIMES DAILY PRN
Qty: 90 TABLET | Refills: 0 | Status: SHIPPED | OUTPATIENT
Start: 2023-07-03 | End: 2023-08-01 | Stop reason: SDUPTHER

## 2023-07-03 RX ORDER — MIDODRINE HYDROCHLORIDE 5 MG/1
5 TABLET ORAL 3 TIMES DAILY
Qty: 90 TABLET | Refills: 2 | Status: SHIPPED | OUTPATIENT
Start: 2023-07-03

## 2023-07-03 NOTE — PROGRESS NOTES
Subjective   Patient ID: Pretty Devine is a 28 y.o. female who presents for Follow-up.    Pt was seen [facility] for chest wall infection.   Pt states the chest pain have subsided and lump on chest is going down.   She states she is still having some chronic abdominal Pain.      Pt needs refills          Review of Systems  12 Systems have been reviewed as follows.  Constitutional: Fever, weight gain, weight loss, appetite change, night sweats, fatigue, chills.  Eyes : blurry, double vision, vision, loss, tearing, redness, pain, sensitivity to light, glaucoma.  Ears, nose, mouth, and throat: Hearing loss, ringing in the ears, ear pain, nasal congestion, nasal drainage, nosebleeds, mouth, throat, irritation tooth problem.  Cardiovascular :chest pain, pressure, heart racing, palpitations, sweating, leg swelling, high or low blood pressure  Pulmonary: Cough, yellow or green sputum, blood and sputum, shortness of breath, wheezing  Gastrointestinal: Nausea, vomiting, diarrhea, constipation, pain, blood in stool, or vomitus, heartburn, difficulty swallowing  Genitourinary: incontinence, abnormal bleeding, abnormal discharge, urinary frequency, urinary hesitancy, pain, impotence sexual problem, infection, urinary retention  Musculoskeletal: Pain, stiffness, joint, redness or warmth, arthritis, back pain, weakness, muscle wasting, sprain or fracture  Neuro: Weight weakness, dizziness, change in voice, change in taste change in vision, change in hearing, loss, or change of sensation, trouble walking, balance problems coordination problems, shaking, speech problem  Endocrine , cold or heat intolerance, blood sugar problem, weight gain or loss missed periods hot flashes, sweats, change in body hair, change in libido, increased thirst, increased urination  Heme/lymph: Swelling, bleeding, problem anemia, bruising, enlarged lymph nodes  Allergic/immunologic: H. plus nasal drip, watery itchy eyes, nasal drainage,  "immunosuppressed  The above were reviewed and noted negative except as noted in HPI and Problem List.    Objective   BP 98/64   Pulse 78   Resp 16   Ht 1.626 m (5' 4\")   Wt 54.9 kg (121 lb)   SpO2 98%   BMI 20.77 kg/m²     Physical Exam  Constitutional: Well developed, well nourished, alert and in no acute distress   Eyes: Normal external exam. Pupils equally round and reactive to light with normal accommodation and extraocular movements intact.  Neck: Supple, no lymphadenopathy or masses.   Cardiovascular: Regular rate and rhythm, normal S1 and S2, no murmurs, gallops, or rubs. Radial pulses normal. No peripheral edema.  Pulmonary: No respiratory distress, lungs clear to auscultation bilaterally. No wheezes, rhonchi, rales.  Abdomen: soft,non tender, non distended, without masses or HSM  Skin: Warm, well perfused, normal skin turgor and color.   Neurologic: Cranial nerves II-XII grossly intact.   Psychiatric: Mood calm and affect normal  Musculoskeletal: Moving all extremities without restriction    Assessment/Plan   Problem List Items Addressed This Visit       Fatigue    Relevant Orders    Thyroid Stimulating Hormone    Follow Up In [facility] - PCP - Established    Hyperlipidemia    Relevant Orders    Lipid Panel    Follow Up In [facility] - PCP - Established    Osler hemorrhagic telangiectasia syndrome (CMS/HCC)    Relevant Medications    oxyCODONE-acetaminophen (Percocet) 5-325 mg tablet    Other Relevant Orders    Magnesium    Follow Up In [facility] - PCP - Established    Abdominal pain    Relevant Medications    oxyCODONE-acetaminophen (Percocet) 5-325 mg tablet    Other Relevant Orders    Follow Up In [facility] - PCP - Established     Other Visit Diagnoses       Idiopathic hypotension        Relevant Medications    midodrine (Proamatine) 5 mg tablet    Other Relevant Orders    Follow Up In [facility] - PCP - Established    Medication management  "       Relevant Orders    Opiate/Opioid/Benzo Extended Prescription Compliance    Drug Screen 9 Panel, Blood with Reflex to Confirmation    Follow Up In [facility] - PCP - Established            Continue current medications and therapy for chronic medical conditions      Provider Attestation - Scribe documentation    All medical record entries made by the Scribe were at my direction and personally dictated by me. I have reviewed the chart and agree that the record accurately reflects my personal performance of the history, physical exam, discussion and plan.      Scribe Attestation  By signing my name below, I, Eric Aguilera MD   , Fabyibe   attest that this documentation has been prepared under the direction and in the presence of Eric Aguilera MD.           Sees Dr. Mast & Dipti & Tulio also       See  Dr. Hill 8/22/23 hepatologist     Patient saw Dr. Lopez who sent patient back to us     UDS today    BW prior    Consider decreasing oxycodone next    I have personally reviewed the OARRS report with the patient and have considered the risk of abuse, addiction, dependence and diversion.    Patient's use of medication is allowing patient to be able to perform ADL's. Patient is always being evaluated for the possibility of lowering the medication dosage.

## 2023-07-06 LAB
6-ACETYLMORPHINE: <25 NG/ML
7-AMINOCLONAZEPAM: <25 NG/ML
ALPHA-HYDROXYALPRAZOLAM: <25 NG/ML
ALPHA-HYDROXYMIDAZOLAM: <25 NG/ML
ALPRAZOLAM: <25 NG/ML
AMPHETAMINE (PRESENCE) IN URINE BY SCREEN METHOD: ABNORMAL
BARBITURATES PRESENCE IN URINE BY SCREEN METHOD: ABNORMAL
CANNABINOIDS IN URINE BY SCREEN METHOD: ABNORMAL
CHLORDIAZEPOXIDE: <25 NG/ML
CLONAZEPAM: <25 NG/ML
COCAINE (PRESENCE) IN URINE BY SCREEN METHOD: ABNORMAL
CODEINE: <50 NG/ML
CREATINE, URINE FOR DRUG: 74.1 MG/DL
DIAZEPAM: <25 NG/ML
DRUG SCREEN COMMENT URINE: ABNORMAL
EDDP: <25 NG/ML
FENTANYL CONFIRMATION, URINE: <2.5 NG/ML
HYDROCODONE: <25 NG/ML
HYDROMORPHONE: <25 NG/ML
LORAZEPAM: <25 NG/ML
METHADONE CONFIRMATION,URINE: <25 NG/ML
MIDAZOLAM: <25 NG/ML
MORPHINE URINE: <50 NG/ML
NORDIAZEPAM: <25 NG/ML
NORFENTANYL: <2.5 NG/ML
NORHYDROCODONE: <25 NG/ML
NOROXYCODONE: 934 NG/ML
O-DESMETHYLTRAMADOL: <50 NG/ML
OXAZEPAM: <25 NG/ML
OXYCODONE: 78 NG/ML
OXYMORPHONE: 1245 NG/ML
PHENCYCLIDINE (PRESENCE) IN URINE BY SCREEN METHOD: ABNORMAL
TEMAZEPAM: <25 NG/ML
TRAMADOL: <50 NG/ML
ZOLPIDEM METABOLITE (ZCA): <25 NG/ML
ZOLPIDEM: <25 NG/ML

## 2023-07-11 ENCOUNTER — E-VISIT (OUTPATIENT)
Dept: PRIMARY CARE | Facility: CLINIC | Age: 28
End: 2023-07-11
Payer: COMMERCIAL

## 2023-07-11 DIAGNOSIS — F41.1 GENERALIZED ANXIETY DISORDER: ICD-10-CM

## 2023-07-13 ENCOUNTER — PATIENT OUTREACH (OUTPATIENT)
Dept: PRIMARY CARE | Facility: CLINIC | Age: 28
End: 2023-07-13

## 2023-07-13 ENCOUNTER — APPOINTMENT (OUTPATIENT)
Dept: PRIMARY CARE | Facility: CLINIC | Age: 28
End: 2023-07-13
Payer: COMMERCIAL

## 2023-07-13 NOTE — PROGRESS NOTES
Unable to reach patient for call back after patient's follow up appointment with PCP.   HEMALM with call back number for patient to call if needed   If no voicemail available call attempts x 2 were made to contact the patient to assist with any questions or concerns patient may have.

## 2023-07-13 NOTE — TELEPHONE ENCOUNTER
From: Pretty Devine  To: rEic Aguilera MD  Sent: 7/11/2023 7:03 AM EDT  Subject: Medication     Please ask him again about some diazepam for prn anxiety, I have been on it in the past by him and the last 3 days my anxiety has been the worst I’m having a hard time sleeping and focusing because of it

## 2023-07-18 RX ORDER — HYDROXYZINE PAMOATE 25 MG/1
25 CAPSULE ORAL 3 TIMES DAILY PRN
Qty: 30 CAPSULE | Refills: 0 | Status: SHIPPED | OUTPATIENT
Start: 2023-07-18 | End: 2023-10-05 | Stop reason: SDUPTHER

## 2023-07-21 ENCOUNTER — TELEPHONE (OUTPATIENT)
Dept: PRIMARY CARE | Facility: CLINIC | Age: 28
End: 2023-07-21

## 2023-07-21 DIAGNOSIS — F41.1 GENERALIZED ANXIETY DISORDER: ICD-10-CM

## 2023-07-21 DIAGNOSIS — I78.0 OSLER HEMORRHAGIC TELANGIECTASIA SYNDROME (CMS-HCC): ICD-10-CM

## 2023-07-21 DIAGNOSIS — R10.84 GENERALIZED ABDOMINAL PAIN: ICD-10-CM

## 2023-07-21 NOTE — TELEPHONE ENCOUNTER
Dr. Aguilera patient    Alejandra a care coordinator from [facility] is calling to report that patient is currently admitted to [facility]. Patient is being transferred to ICU today - potential surgery.    If any further questions, Alejandra can be reached at [phone].    Please advise, thank you.

## 2023-07-28 NOTE — TELEPHONE ENCOUNTER
PT CARE COORDINATOR CALLING TO LET US KNOW PT WAS DISCHARGED FROM THE HOSPITAL, SHE WAS INFORMED TO REACH OUT AND MAKE A FUV WITH CB.

## 2023-07-31 ENCOUNTER — PATIENT OUTREACH (OUTPATIENT)
Dept: PRIMARY CARE | Facility: CLINIC | Age: 28
End: 2023-07-31
Payer: COMMERCIAL

## 2023-07-31 NOTE — PROGRESS NOTES
Discharge Facility: [facility]  Discharge Diagnosis: Hereditary hemorrhagic telangiectasia   Admission Date: 7/17/23  Discharge Date: 7/28/23     PCP Appointment Date: 8/1/23   Specialist Appointment Date:   - Pain management: 8/7/23  - Hematology: 8/11/23  - ENT: TBD  Hospital Encounter and Summary: Linked    TCM outreach deferred as this patient has a follow up scheduled with PCP within 2 business days of DC on 8/1/23

## 2023-08-01 ENCOUNTER — TELEMEDICINE (OUTPATIENT)
Dept: PRIMARY CARE | Facility: CLINIC | Age: 28
End: 2023-08-01
Payer: COMMERCIAL

## 2023-08-01 DIAGNOSIS — R10.84 GENERALIZED ABDOMINAL PAIN: ICD-10-CM

## 2023-08-01 DIAGNOSIS — F41.1 GENERALIZED ANXIETY DISORDER: ICD-10-CM

## 2023-08-01 DIAGNOSIS — Q27.30 AVM (ARTERIOVENOUS MALFORMATION) (HHS-HCC): ICD-10-CM

## 2023-08-01 DIAGNOSIS — G90.A POTS (POSTURAL ORTHOSTATIC TACHYCARDIA SYNDROME): ICD-10-CM

## 2023-08-01 DIAGNOSIS — I78.0 OSLER HEMORRHAGIC TELANGIECTASIA SYNDROME (CMS-HCC): ICD-10-CM

## 2023-08-01 DIAGNOSIS — G40.009 PARTIAL IDIOPATHIC EPILEPSY WITH SEIZURES OF LOCALIZED ONSET, NOT INTRACTABLE, WITHOUT STATUS EPILEPTICUS (MULTI): ICD-10-CM

## 2023-08-01 DIAGNOSIS — M32.9 LUPUS (MULTI): ICD-10-CM

## 2023-08-01 DIAGNOSIS — E78.2 MIXED HYPERLIPIDEMIA: ICD-10-CM

## 2023-08-01 DIAGNOSIS — R00.2 PALPITATIONS: ICD-10-CM

## 2023-08-01 DIAGNOSIS — J30.1 SEASONAL ALLERGIC RHINITIS DUE TO POLLEN: ICD-10-CM

## 2023-08-01 DIAGNOSIS — K21.00 GASTROESOPHAGEAL REFLUX DISEASE WITH ESOPHAGITIS WITHOUT HEMORRHAGE: ICD-10-CM

## 2023-08-01 DIAGNOSIS — F31.9 BIPOLAR 1 DISORDER (MULTI): ICD-10-CM

## 2023-08-01 DIAGNOSIS — L03.313 CELLULITIS OF CHEST WALL: ICD-10-CM

## 2023-08-01 PROBLEM — E44.0 MALNUTRITION OF MODERATE DEGREE (MULTI): Status: ACTIVE | Noted: 2023-07-19

## 2023-08-01 PROBLEM — R91.8 LUNG NODULES: Status: ACTIVE | Noted: 2023-07-14

## 2023-08-01 PROBLEM — E11.9 CONTROLLED TYPE 2 DIABETES MELLITUS WITHOUT COMPLICATION, WITHOUT LONG-TERM CURRENT USE OF INSULIN (MULTI): Status: ACTIVE | Noted: 2023-07-15

## 2023-08-01 PROBLEM — R74.01 TRANSAMINITIS: Status: ACTIVE | Noted: 2023-08-01

## 2023-08-01 PROBLEM — R20.0 NUMBNESS: Status: ACTIVE | Noted: 2023-07-25

## 2023-08-01 PROCEDURE — 99496 TRANSJ CARE MGMT HIGH F2F 7D: CPT | Performed by: FAMILY MEDICINE

## 2023-08-01 RX ORDER — RISPERIDONE 1 MG/1
0.5 TABLET ORAL DAILY
Qty: 30 TABLET | Refills: 1 | Status: CANCELLED | OUTPATIENT
Start: 2023-08-01

## 2023-08-01 RX ORDER — OXYCODONE AND ACETAMINOPHEN 5; 325 MG/1; MG/1
1 TABLET ORAL 2 TIMES DAILY PRN
Qty: 60 TABLET | Refills: 0 | Status: SHIPPED | OUTPATIENT
Start: 2023-08-01 | End: 2023-08-18 | Stop reason: SDUPTHER

## 2023-08-01 RX ORDER — OXYCODONE AND ACETAMINOPHEN 5; 325 MG/1; MG/1
1 TABLET ORAL 3 TIMES DAILY PRN
Qty: 90 TABLET | Refills: 0 | Status: CANCELLED | OUTPATIENT
Start: 2023-08-01 | End: 2023-08-31

## 2023-08-01 RX ORDER — RISPERIDONE 2 MG/1
2 TABLET ORAL DAILY
Qty: 30 TABLET | Refills: 1 | Status: SHIPPED | OUTPATIENT
Start: 2023-08-01 | End: 2023-09-08 | Stop reason: SDUPTHER

## 2023-08-01 ASSESSMENT — ENCOUNTER SYMPTOMS
HEMATURIA: 0
FATIGUE: 0
BLOOD IN STOOL: 0
MYALGIAS: 0
DYSPHORIC MOOD: 0
FLANK PAIN: 0
POLYPHAGIA: 0
DEPRESSED MOOD: 1
ARTHRALGIAS: 0
SINUS PRESSURE: 0
ABDOMINAL DISTENTION: 0
DEPRESSION: 1
STRIDOR: 0
APPETITE CHANGE: 0
POLYDIPSIA: 0
EYE PAIN: 0
CONSTITUTIONAL NEGATIVE: 1
SORE THROAT: 0
ADENOPATHY: 0
HEADACHES: 0
DIARRHEA: 0
AGITATION: 0
SPEECH DIFFICULTY: 0
RHINORRHEA: 0
COUGH: 0
SEIZURES: 0
SINUS PAIN: 0
CHEST TIGHTNESS: 0
PALPITATIONS: 0
COLOR CHANGE: 0
PHOTOPHOBIA: 0
FEVER: 0
ABDOMINAL PAIN: 0
DECREASED CONCENTRATION: 0
NERVOUS/ANXIOUS: 0
RECTAL PAIN: 0
DYSURIA: 0
DIZZINESS: 0
SHORTNESS OF BREATH: 0
ACTIVITY CHANGE: 0
CONSTIPATION: 0
SLEEP DISTURBANCE: 0
NECK STIFFNESS: 0
CONFUSION: 0
TROUBLE SWALLOWING: 0

## 2023-08-01 ASSESSMENT — PATIENT HEALTH QUESTIONNAIRE - PHQ9
2. FEELING DOWN, DEPRESSED OR HOPELESS: NOT AT ALL
SUM OF ALL RESPONSES TO PHQ9 QUESTIONS 1 AND 2: 0
1. LITTLE INTEREST OR PLEASURE IN DOING THINGS: NOT AT ALL

## 2023-08-01 NOTE — PROGRESS NOTES
Subjective   Patient ID: Pretty Devine is a 28 y.o. female who presents for Depression (Patient presents in office for a follow up of depression. Patient is currently taking Celexa and Risperidone. Patient denies any adverse side effects from the current prescribed medications. /).    Depression  Visit Type: follow-up  Patient presents with the following symptoms: depressed mood.  Patient is not experiencing: confusion, decreased concentration, nervousness/anxiety, palpitations and shortness of breath.  Frequency of symptoms: most days   Severity: moderate        Review of Systems   Constitutional: Negative.  Negative for activity change, appetite change, fatigue and fever.   HENT:  Negative for congestion, dental problem, ear discharge, ear pain, mouth sores, rhinorrhea, sinus pressure, sinus pain, sore throat, tinnitus and trouble swallowing.    Eyes:  Negative for photophobia, pain and visual disturbance.   Respiratory:  Negative for cough, chest tightness, shortness of breath and stridor.    Cardiovascular:  Negative for chest pain and palpitations.   Gastrointestinal:  Negative for abdominal distention, abdominal pain, blood in stool, constipation, diarrhea and rectal pain.   Endocrine: Negative for cold intolerance, heat intolerance, polydipsia, polyphagia and polyuria.   Genitourinary:  Negative for dysuria, flank pain, hematuria and urgency.   Musculoskeletal:  Negative for arthralgias, gait problem, myalgias and neck stiffness.   Skin:  Negative for color change and rash.   Allergic/Immunologic: Negative for environmental allergies and food allergies.   Neurological:  Negative for dizziness, seizures, syncope, speech difficulty and headaches.   Hematological:  Negative for adenopathy.   Psychiatric/Behavioral:  Positive for depression. Negative for agitation, confusion, decreased concentration, dysphoric mood and sleep disturbance. The patient is not nervous/anxious.      Objective   There were no vitals  taken for this visit.    Physical Exam  Constitutional: Well developed, well nourished, alert and in no acute distress   Psychiatric: Mood calm and affect normal  Musculoskeletal: Moving all extremities without restriction     Assessment/Plan   Problem List Items Addressed This Visit       Allergic rhinitis    Relevant Orders    Follow Up In [facility] - PCP - Established    Anxiety disorder    Relevant Medications    risperiDONE (RisperDAL) 2 mg tablet    Other Relevant Orders    Follow Up In [facility] - PCP - Established    Epilepsy (CMS/[facility])    Relevant Orders    Follow Up In [facility] - PCP - Established    Hyperlipidemia    Relevant Orders    Follow Up In [facility] - PCP - Established    Lupus (CMS/[facility])    Relevant Orders    Follow Up In [facility] - PCP - Established    Osler hemorrhagic telangiectasia syndrome (CMS/[facility])    Relevant Medications    oxyCODONE-acetaminophen (Percocet) 5-325 mg tablet    Other Relevant Orders    Follow Up In [facility] - PCP - Established    Abdominal pain    Relevant Medications    oxyCODONE-acetaminophen (Percocet) 5-325 mg tablet    Other Relevant Orders    Follow Up In [facility] - PCP - Established    Bipolar 1 disorder (CMS/[facility])    Relevant Orders    Follow Up In [facility] - PCP - Established    AVM (arteriovenous malformation)    Relevant Orders    Follow Up In [facility] - PCP - Established    Palpitations    Relevant Orders    Follow Up In [facility] - PCP - Established    GERD (gastroesophageal reflux disease)    Relevant Orders    Follow Up In [facility] - PCP - Established    Cellulitis of chest wall    Relevant Orders    Follow Up In [facility] - PCP - Established    POTS (postural orthostatic tachycardia syndrome)    Relevant Orders    Follow Up In [facility] - PCP - Established     Patient was advised importance of proper  diet/nutrition in addition adequate hydration. Patient was encouraged moderate exercise program to include 30 minutes daily for 5 days of the week or 150 minutes weekly. Patient will follow-up with us as scheduled.     Decrease Percocet to 1 tab BID PRN.     I have personally reviewed the OARRS report with the patient and have considered the risk of abuse, addiction, dependence and diversion.     Patient's use of medication is allowing patient to be able to perform  ADL's. Patient is always being evaluated for the possibility of lowering the medication dosage.    continue all current medications and therapy for chronic medical conditions    Increase Risperidone to 2 mg.     Virtual Visit - Audio and Visual Communication Real Time     Scribe Attestation  By signing my name below, I, STEVE Perez   , Scribe   attest that this documentation has been prepared under the direction and in the presence of Eric Aguilera MD.     Provider Attestation - Scribe documentation    All medical record entries made by the Scribe were at my direction and personally dictated by me. I have reviewed the chart and agree that the record accurately reflects my personal performance of the history, physical exam, discussion and plan.

## 2023-08-04 ENCOUNTER — PATIENT OUTREACH (OUTPATIENT)
Dept: PRIMARY CARE | Facility: CLINIC | Age: 28
End: 2023-08-04

## 2023-08-04 ENCOUNTER — APPOINTMENT (OUTPATIENT)
Dept: PRIMARY CARE | Facility: CLINIC | Age: 28
End: 2023-08-04
Payer: COMMERCIAL

## 2023-08-04 NOTE — PROGRESS NOTES
Call regarding appt. with PCP on 8/1/23 after hospitalization.  At time of outreach call the patient feels as if their condition has improved since last visit.  Reviewed the PCP appointment with the pt and addressed any questions or concerns.

## 2023-08-11 DIAGNOSIS — R10.84 GENERALIZED ABDOMINAL PAIN: ICD-10-CM

## 2023-08-11 DIAGNOSIS — I78.0 OSLER HEMORRHAGIC TELANGIECTASIA SYNDROME (CMS-HCC): ICD-10-CM

## 2023-08-14 RX ORDER — OXYCODONE AND ACETAMINOPHEN 5; 325 MG/1; MG/1
1 TABLET ORAL 3 TIMES DAILY PRN
Qty: 42 TABLET | Refills: 0 | OUTPATIENT
Start: 2023-08-14 | End: 2023-08-28

## 2023-08-15 ENCOUNTER — TELEMEDICINE (OUTPATIENT)
Dept: PRIMARY CARE | Facility: CLINIC | Age: 28
End: 2023-08-15
Payer: COMMERCIAL

## 2023-08-15 DIAGNOSIS — R53.83 FATIGUE, UNSPECIFIED TYPE: ICD-10-CM

## 2023-08-15 DIAGNOSIS — F31.9 BIPOLAR 1 DISORDER (MULTI): ICD-10-CM

## 2023-08-15 DIAGNOSIS — M32.9 LUPUS (MULTI): Primary | ICD-10-CM

## 2023-08-15 DIAGNOSIS — I78.0 OSLER HEMORRHAGIC TELANGIECTASIA SYNDROME (CMS-HCC): ICD-10-CM

## 2023-08-15 DIAGNOSIS — I95.0 IDIOPATHIC HYPOTENSION: ICD-10-CM

## 2023-08-15 DIAGNOSIS — E78.2 MIXED HYPERLIPIDEMIA: ICD-10-CM

## 2023-08-15 DIAGNOSIS — R10.84 GENERALIZED ABDOMINAL PAIN: ICD-10-CM

## 2023-08-15 DIAGNOSIS — Z79.899 MEDICATION MANAGEMENT: ICD-10-CM

## 2023-08-15 PROCEDURE — 99496 TRANSJ CARE MGMT HIGH F2F 7D: CPT | Performed by: FAMILY MEDICINE

## 2023-08-15 NOTE — PROGRESS NOTES
Subjective   Patient ID: Pretty Devine is a 28 y.o. female who presents for No chief complaint on file..    HPI     Review of Systems  12 Systems have been reviewed as follows.  Constitutional: Fever, weight gain, weight loss, appetite change, night sweats, fatigue, chills.  Eyes : blurry, double vision, vision, loss, tearing, redness, pain, sensitivity to light, glaucoma.  Ears, nose, mouth, and throat: Hearing loss, ringing in the ears, ear pain, nasal congestion, nasal drainage, nosebleeds, mouth, throat, irritation tooth problem.  Cardiovascular :chest pain, pressure, heart racing, palpitations, sweating, leg swelling, high or low blood pressure  Pulmonary: Cough, yellow or green sputum, blood and sputum, shortness of breath, wheezing  Gastrointestinal: Nausea, vomiting, diarrhea, constipation, pain, blood in stool, or vomitus, heartburn, difficulty swallowing  Genitourinary: incontinence, abnormal bleeding, abnormal discharge, urinary frequency, urinary hesitancy, pain, impotence sexual problem, infection, urinary retention  Musculoskeletal: Pain, stiffness, joint, redness or warmth, arthritis, back pain, weakness, muscle wasting, sprain or fracture  Neuro: Weight weakness, dizziness, change in voice, change in taste change in vision, change in hearing, loss, or change of sensation, trouble walking, balance problems coordination problems, shaking, speech problem  Endocrine , cold or heat intolerance, blood sugar problem, weight gain or loss missed periods hot flashes, sweats, change in body hair, change in libido, increased thirst, increased urination  Heme/lymph: Swelling, bleeding, problem anemia, bruising, enlarged lymph nodes  Allergic/immunologic: H. plus nasal drip, watery itchy eyes, nasal drainage, immunosuppressed  The above were reviewed and noted negative except as noted in HPI and Problem List.    Objective   There were no vitals taken for this visit.    Physical Exam  Constitutional: Well  developed, well nourished, alert and in no acute distress     Assessment/Plan   Problem List Items Addressed This Visit       Fatigue    Relevant Orders    Follow Up In [facility] - PCP - Established    Hyperlipidemia    Relevant Orders    Follow Up In [facility] - PCP - Established    Lupus (CMS/HCC) - Primary    Relevant Orders    Follow Up In [facility] - PCP - Established    Osler hemorrhagic telangiectasia syndrome (CMS/HCC)    Relevant Orders    Follow Up In [facility] - PCP - Established    Abdominal pain    Relevant Orders    Follow Up In [facility] - PCP - Established    Bipolar 1 disorder (CMS/[facility])    Relevant Orders    Follow Up In [facility] - PCP - Established     Other Visit Diagnoses       Idiopathic hypotension        Relevant Orders    Follow Up In [facility] - PCP - Established    Medication management        Relevant Orders    Follow Up In [facility] - PCP - Established          Virtual Visit - Audio and Visual Communication Real Time     I have personally reviewed the OARRS report with the patient and have considered the risk of abuse, addiction, dependence and diversion.    Patient's use of medication is allowing patient to be able to perform ADL's. Patient is always being evaluated for the possibility of lowering the medication dosage.    Continue current medications and therapy for chronic medical conditions

## 2023-08-18 DIAGNOSIS — I78.0 OSLER HEMORRHAGIC TELANGIECTASIA SYNDROME (CMS-HCC): ICD-10-CM

## 2023-08-18 DIAGNOSIS — R10.84 GENERALIZED ABDOMINAL PAIN: ICD-10-CM

## 2023-08-18 RX ORDER — OXYCODONE AND ACETAMINOPHEN 5; 325 MG/1; MG/1
1 TABLET ORAL 2 TIMES DAILY PRN
Qty: 60 TABLET | Refills: 0 | Status: CANCELLED | OUTPATIENT
Start: 2023-08-18 | End: 2023-09-17

## 2023-08-18 RX ORDER — OXYCODONE AND ACETAMINOPHEN 5; 325 MG/1; MG/1
1 TABLET ORAL 3 TIMES DAILY PRN
Qty: 45 TABLET | Refills: 0 | Status: SHIPPED | OUTPATIENT
Start: 2023-08-18 | End: 2023-08-30 | Stop reason: SDUPTHER

## 2023-08-18 NOTE — TELEPHONE ENCOUNTER
Dr Aguilera pt    Pt phoned office and is requesting  refill on    Oxycodone    [facility] Hassan

## 2023-08-26 PROBLEM — R04.2 HEMOPTYSIS: Status: ACTIVE | Noted: 2023-07-15

## 2023-08-26 PROBLEM — Z13.71 BRCA NEGATIVE: Status: ACTIVE | Noted: 2023-08-26

## 2023-08-26 PROBLEM — Z30.9 CONTRACEPTION MANAGEMENT: Status: ACTIVE | Noted: 2023-08-26

## 2023-08-26 RX ORDER — ERENUMAB-AOOE 70 MG/ML
70 INJECTION SUBCUTANEOUS
Status: ON HOLD | COMMUNITY
End: 2023-10-16 | Stop reason: ENTERED-IN-ERROR

## 2023-08-26 RX ORDER — ONABOTULINUMTOXINA 100 [USP'U]/1
INJECTION, POWDER, LYOPHILIZED, FOR SOLUTION INTRADERMAL; INTRAMUSCULAR
COMMUNITY
Start: 2023-07-26

## 2023-08-26 RX ORDER — FERROUS SULFATE TAB 325 MG (65 MG ELEMENTAL FE) 325 (65 FE) MG
1 TAB ORAL EVERY OTHER DAY
COMMUNITY
Start: 2023-08-11 | End: 2024-01-29 | Stop reason: ALTCHOICE

## 2023-08-26 RX ORDER — TRANEXAMIC ACID 650 MG/1
1300 TABLET ORAL 2 TIMES DAILY
COMMUNITY
Start: 2023-08-11

## 2023-08-26 RX ORDER — UBROGEPANT 100 MG/1
TABLET ORAL
COMMUNITY

## 2023-08-26 RX ORDER — AMOXICILLIN 500 MG/1
1 CAPSULE ORAL
COMMUNITY
Start: 2023-07-28 | End: 2023-10-05 | Stop reason: ALTCHOICE

## 2023-08-26 RX ORDER — HYDROXYZINE HYDROCHLORIDE 25 MG/1
25 TABLET, FILM COATED ORAL 3 TIMES DAILY PRN
COMMUNITY
End: 2023-11-07 | Stop reason: SDUPTHER

## 2023-08-26 RX ORDER — LEVETIRACETAM 500 MG/1
TABLET ORAL
Status: ON HOLD | COMMUNITY
End: 2023-10-16 | Stop reason: ENTERED-IN-ERROR

## 2023-08-29 ENCOUNTER — TELEPHONE (OUTPATIENT)
Dept: PRIMARY CARE | Facility: CLINIC | Age: 28
End: 2023-08-29
Payer: COMMERCIAL

## 2023-08-29 NOTE — TELEPHONE ENCOUNTER
PT OF [facility] RENTERIA [phone]     PT WENT TO [facility] 08/24 FOR COUGHING UP BLOOD, TRANSFER TO [facility] AND TREATED IN ICU. DISCHARGED 08/29      PT IS SCHEDULED FOR VV ON 09/07 PER PT REQUEST

## 2023-08-30 DIAGNOSIS — R10.84 GENERALIZED ABDOMINAL PAIN: ICD-10-CM

## 2023-08-30 DIAGNOSIS — I78.0 OSLER HEMORRHAGIC TELANGIECTASIA SYNDROME (CMS-HCC): ICD-10-CM

## 2023-08-30 RX ORDER — OXYCODONE AND ACETAMINOPHEN 5; 325 MG/1; MG/1
1 TABLET ORAL 3 TIMES DAILY PRN
Qty: 45 TABLET | Refills: 0 | Status: SHIPPED | OUTPATIENT
Start: 2023-08-30 | End: 2023-09-11 | Stop reason: ALTCHOICE

## 2023-08-30 RX ORDER — OXYCODONE AND ACETAMINOPHEN 5; 325 MG/1; MG/1
1 TABLET ORAL 3 TIMES DAILY PRN
Qty: 45 TABLET | Refills: 0 | Status: CANCELLED | OUTPATIENT
Start: 2023-08-30 | End: 2023-09-14

## 2023-08-30 RX ORDER — OXYCODONE AND ACETAMINOPHEN 5; 325 MG/1; MG/1
1 TABLET ORAL 3 TIMES DAILY PRN
Qty: 45 TABLET | Refills: 0 | Status: SHIPPED | OUTPATIENT
Start: 2023-08-30 | End: 2023-08-30 | Stop reason: SDUPTHER

## 2023-08-30 NOTE — TELEPHONE ENCOUNTER
PT SANDOVAL ACEVES CALLED IN FOR HER MED REFILL, PT IS LEAVING OUT OF TOWN TOMORROW AND WOULD LIKE HER SCRIPT FILLED ASAP    OXYCODONE 5-325    [facility] STORE#[id number]

## 2023-09-07 ENCOUNTER — TELEMEDICINE (OUTPATIENT)
Dept: PRIMARY CARE | Facility: CLINIC | Age: 28
End: 2023-09-07
Payer: COMMERCIAL

## 2023-09-07 DIAGNOSIS — R10.84 GENERALIZED ABDOMINAL PAIN: ICD-10-CM

## 2023-09-07 DIAGNOSIS — F31.9 BIPOLAR 1 DISORDER (MULTI): ICD-10-CM

## 2023-09-07 DIAGNOSIS — M79.7 FIBROMYALGIA: ICD-10-CM

## 2023-09-07 DIAGNOSIS — I78.0 OSLER HEMORRHAGIC TELANGIECTASIA SYNDROME (CMS-HCC): ICD-10-CM

## 2023-09-07 DIAGNOSIS — G43.709 CHRONIC MIGRAINE WITHOUT AURA WITHOUT STATUS MIGRAINOSUS, NOT INTRACTABLE: ICD-10-CM

## 2023-09-07 DIAGNOSIS — F41.1 GENERALIZED ANXIETY DISORDER: ICD-10-CM

## 2023-09-07 DIAGNOSIS — G89.4 CHRONIC PAIN SYNDROME: ICD-10-CM

## 2023-09-07 DIAGNOSIS — F32.4 MAJOR DEPRESSIVE DISORDER WITH SINGLE EPISODE, IN PARTIAL REMISSION (CMS-HCC): Primary | ICD-10-CM

## 2023-09-07 DIAGNOSIS — G43.111 INTRACTABLE MIGRAINE WITH AURA WITH STATUS MIGRAINOSUS: ICD-10-CM

## 2023-09-07 PROCEDURE — 99214 OFFICE O/P EST MOD 30 MIN: CPT | Performed by: FAMILY MEDICINE

## 2023-09-07 ASSESSMENT — ENCOUNTER SYMPTOMS
EYE PAIN: 0
SHORTNESS OF BREATH: 0
NERVOUS/ANXIOUS: 0
AGITATION: 0
NECK STIFFNESS: 0
CONFUSION: 0
TROUBLE SWALLOWING: 0
CONSTIPATION: 0
STRIDOR: 0
APPETITE CHANGE: 0
DIARRHEA: 0
HEADACHES: 0
RHINORRHEA: 0
FATIGUE: 0
ARTHRALGIAS: 0
ACTIVITY CHANGE: 0
POLYDIPSIA: 0
ABDOMINAL PAIN: 0
BLOOD IN STOOL: 0
HEMATURIA: 0
DYSURIA: 0
PALPITATIONS: 0
MYALGIAS: 0
SPEECH DIFFICULTY: 0
COLOR CHANGE: 0
DECREASED CONCENTRATION: 0
INSOMNIA: 1
CHEST TIGHTNESS: 0
SLEEP DISTURBANCE: 0
CONSTITUTIONAL NEGATIVE: 1
PHOTOPHOBIA: 0
SEIZURES: 0
FEVER: 0
DIZZINESS: 0
ABDOMINAL DISTENTION: 0
ADENOPATHY: 0
POLYPHAGIA: 0
DYSPHORIC MOOD: 0
SINUS PRESSURE: 0
SINUS PAIN: 0
FLANK PAIN: 0
SORE THROAT: 0
RECTAL PAIN: 0
COUGH: 0

## 2023-09-07 NOTE — PROGRESS NOTES
Subjective   Patient ID: Pretty Devine is a 28 y.o. female who presents for Insomnia and nerve pain.    Insomnia  This is a new problem. The current episode started 1 to 4 weeks ago. The problem occurs constantly. The problem has been gradually worsening. Pertinent negatives include no abdominal pain, arthralgias, chest pain, congestion, coughing, fatigue, fever, headaches, myalgias, rash or sore throat. Nothing aggravates the symptoms. She has tried position changes (Melatonin) for the symptoms. The treatment provided no relief.   Neuro Problem  This is a recurrent problem. The current episode started 1 to 4 weeks ago. The problem occurs constantly. The problem has been gradually worsening. Pertinent negatives include no abdominal pain, arthralgias, chest pain, congestion, coughing, fatigue, fever, headaches, myalgias, rash or sore throat. She has tried oral narcotics for the symptoms. The treatment provided no relief.        Review of Systems   Constitutional: Negative.  Negative for activity change, appetite change, fatigue and fever.   HENT:  Negative for congestion, dental problem, ear discharge, ear pain, mouth sores, rhinorrhea, sinus pressure, sinus pain, sore throat, tinnitus and trouble swallowing.    Eyes:  Negative for photophobia, pain and visual disturbance.   Respiratory:  Negative for cough, chest tightness, shortness of breath and stridor.    Cardiovascular:  Negative for chest pain and palpitations.   Gastrointestinal:  Negative for abdominal distention, abdominal pain, blood in stool, constipation, diarrhea and rectal pain.   Endocrine: Negative for cold intolerance, heat intolerance, polydipsia, polyphagia and polyuria.   Genitourinary:  Negative for dysuria, flank pain, hematuria and urgency.   Musculoskeletal:  Negative for arthralgias, gait problem, myalgias and neck stiffness.   Skin:  Negative for color change and rash.   Allergic/Immunologic: Negative for environmental allergies and food  allergies.   Neurological:  Negative for dizziness, seizures, syncope, speech difficulty and headaches.   Hematological:  Negative for adenopathy.   Psychiatric/Behavioral:  Negative for agitation, confusion, decreased concentration, dysphoric mood and sleep disturbance. The patient has insomnia. The patient is not nervous/anxious.        Objective   There were no vitals taken for this visit.    Physical Exam  Constitutional: Well developed, well nourished, alert and in no acute distress   Eyes: Normal external exam. Pupils equally round and reactive to light with normal accommodation and extraocular movements intact.  Musculoskeletal: Moving all extremities without restriction     Assessment/Plan   Problem List Items Addressed This Visit       Anxiety disorder    Relevant Medications    risperiDONE (RisperDAL) 4 mg tablet    Chronic pain    Relevant Medications    pregabalin (Lyrica) 150 mg capsule    Fibromyalgia    Relevant Medications    pregabalin (Lyrica) 150 mg capsule    Intractable migraine with aura with status migrainosus    Osler hemorrhagic telangiectasia syndrome (CMS/HCC)    Abdominal pain    Bipolar 1 disorder (CMS/HCC)    Migraine without status migrainosus, not intractable    Major depressive disorder, single episode, unspecified - Primary     Virtual Visit - Audio and Visual Communication Real Time     Patient was advised importance of proper diet/nutrition in addition adequate hydration. Patient was encouraged moderate exercise program to include 30 minutes daily for 5 days of the week or 150 minutes weekly. Patient will follow-up with us as scheduled.     I have personally reviewed the OARRS report with the patient and have considered the risk of abuse, addiction, dependence and diversion.    Patient's use of medication is allowing patient to be able to perform  ADL's. Patient is always being evaluated for the possibility of lowering the medication dosage.     continue all current medications  and therapy for chronic medical conditions

## 2023-09-08 ENCOUNTER — E-VISIT (OUTPATIENT)
Dept: PRIMARY CARE | Facility: CLINIC | Age: 28
End: 2023-09-08
Payer: COMMERCIAL

## 2023-09-08 DIAGNOSIS — R10.84 GENERALIZED ABDOMINAL PAIN: ICD-10-CM

## 2023-09-08 DIAGNOSIS — I78.0 OSLER HEMORRHAGIC TELANGIECTASIA SYNDROME (CMS-HCC): ICD-10-CM

## 2023-09-08 RX ORDER — RISPERIDONE 4 MG/1
4 TABLET ORAL DAILY
Qty: 30 TABLET | Refills: 1 | Status: SHIPPED | OUTPATIENT
Start: 2023-09-08 | End: 2023-11-09

## 2023-09-08 RX ORDER — PREGABALIN 150 MG/1
150 CAPSULE ORAL 3 TIMES DAILY
Qty: 90 CAPSULE | Refills: 0 | Status: SHIPPED | OUTPATIENT
Start: 2023-09-08 | End: 2023-12-19 | Stop reason: SDUPTHER

## 2023-09-08 NOTE — TELEPHONE ENCOUNTER
Pt had virtual last night and dr renewed all scripts except:    oxyCODONE-acetaminophen (Percocet) 5-325 mg tablet     Please call in this additional script to    Pharmacy- [id number] - [place], [place] - [address] [facility] [address]  [address] [place] [address] [place] [address]  Phone: [phone]  Fax: [phone]

## 2023-09-08 NOTE — TELEPHONE ENCOUNTER
Pt had virtual last night and dr renewed all scripts except:    oxyCODONE-acetaminophen (Percocet) 5-325 mg tablet      Please call in this additional script to     Pharmacy- [facility] #[id number] - [place], [place] - [address] AT [facility] [address]  [address] [place] [address] [place] [address]  Phone: [phone]  Fax: [phone]        Pt calling to check on this again.

## 2023-09-10 RX ORDER — OXYCODONE AND ACETAMINOPHEN 5; 325 MG/1; MG/1
1 TABLET ORAL 3 TIMES DAILY PRN
Qty: 45 TABLET | Refills: 0 | OUTPATIENT
Start: 2023-09-10 | End: 2023-09-25

## 2023-09-11 ENCOUNTER — TELEPHONE (OUTPATIENT)
Dept: PRIMARY CARE | Facility: CLINIC | Age: 28
End: 2023-09-11
Payer: COMMERCIAL

## 2023-09-11 DIAGNOSIS — G43.111 INTRACTABLE MIGRAINE WITH AURA WITH STATUS MIGRAINOSUS: Primary | ICD-10-CM

## 2023-09-11 DIAGNOSIS — I78.0 OSLER HEMORRHAGIC TELANGIECTASIA SYNDROME (CMS-HCC): ICD-10-CM

## 2023-09-11 RX ORDER — OXYCODONE AND ACETAMINOPHEN 5; 325 MG/1; MG/1
1 TABLET ORAL 3 TIMES DAILY PRN
Qty: 45 TABLET | Refills: 0 | OUTPATIENT
Start: 2023-09-11 | End: 2023-09-26

## 2023-09-11 RX ORDER — OXYCODONE AND ACETAMINOPHEN 7.5; 325 MG/1; MG/1
1 TABLET ORAL EVERY 8 HOURS PRN
Qty: 45 TABLET | Refills: 0 | Status: SHIPPED | OUTPATIENT
Start: 2023-09-11 | End: 2023-09-25 | Stop reason: ALTCHOICE

## 2023-09-11 NOTE — TELEPHONE ENCOUNTER
PT IS CALLING AGAIN IN REGARDS TO OXYCODONE REFILL. WAS REFUSED BECAUSE NOT DUE FOR A REFILL, BUT SHE SAYS SHE IS OUT/GOING THROUGH WITHDRAWS NOT TAKING IT AND CB TOLD HER SHE WOULD SEND IT THIS MORNING BY 8AM.

## 2023-09-14 DIAGNOSIS — F41.1 GENERALIZED ANXIETY DISORDER: ICD-10-CM

## 2023-09-14 NOTE — TELEPHONE ENCOUNTER
Dr. Aguilera Pt      Refill for  citalopram (CeleXA) 10 mg tablet     Pharmacy--  [facility] #[id number] - [place], [place] - [address] AT [facility] OF [address]

## 2023-09-15 RX ORDER — CITALOPRAM 10 MG/1
10 TABLET ORAL DAILY
Qty: 90 TABLET | Refills: 1 | Status: SHIPPED | OUTPATIENT
Start: 2023-09-15 | End: 2023-10-19 | Stop reason: SDUPTHER

## 2023-09-20 ENCOUNTER — PATIENT OUTREACH (OUTPATIENT)
Dept: PRIMARY CARE | Facility: CLINIC | Age: 28
End: 2023-09-20
Payer: COMMERCIAL

## 2023-09-20 DIAGNOSIS — K92.0 HEMATEMESIS, UNSPECIFIED WHETHER NAUSEA PRESENT: ICD-10-CM

## 2023-09-20 NOTE — PROGRESS NOTES
Discharge Facility: [facility]   Discharge Diagnosis: Hematemesis RLQ abdominal pain   Admission Date: 9/18/23  Discharge Date: patient left AMA on 9/19/23 due to not receiving more pain meds that she was demanding prior to wait on pain management     PCP Appointment Date: 9/25/23 Dr Vega     [facility] Encounter and Summary: Linked     I left voicemail to introduce myself and the TCM program to Pretty ELKINS Nilson.

## 2023-09-25 ENCOUNTER — TELEMEDICINE (OUTPATIENT)
Dept: PRIMARY CARE | Facility: CLINIC | Age: 28
End: 2023-09-25
Payer: COMMERCIAL

## 2023-09-25 DIAGNOSIS — R00.2 PALPITATIONS: ICD-10-CM

## 2023-09-25 DIAGNOSIS — Q27.30 AVM (ARTERIOVENOUS MALFORMATION) (HHS-HCC): ICD-10-CM

## 2023-09-25 DIAGNOSIS — G90.A POTS (POSTURAL ORTHOSTATIC TACHYCARDIA SYNDROME): ICD-10-CM

## 2023-09-25 DIAGNOSIS — E78.2 MIXED HYPERLIPIDEMIA: ICD-10-CM

## 2023-09-25 DIAGNOSIS — I78.0 OSLER HEMORRHAGIC TELANGIECTASIA SYNDROME (CMS-HCC): ICD-10-CM

## 2023-09-25 DIAGNOSIS — F41.1 GENERALIZED ANXIETY DISORDER: ICD-10-CM

## 2023-09-25 DIAGNOSIS — G43.111 INTRACTABLE MIGRAINE WITH AURA WITH STATUS MIGRAINOSUS: ICD-10-CM

## 2023-09-25 DIAGNOSIS — K21.00 GASTROESOPHAGEAL REFLUX DISEASE WITH ESOPHAGITIS WITHOUT HEMORRHAGE: ICD-10-CM

## 2023-09-25 DIAGNOSIS — R10.9 ABDOMINAL PAIN, UNSPECIFIED ABDOMINAL LOCATION: ICD-10-CM

## 2023-09-25 DIAGNOSIS — L03.313 CELLULITIS OF CHEST WALL: ICD-10-CM

## 2023-09-25 DIAGNOSIS — M32.9 LUPUS (MULTI): ICD-10-CM

## 2023-09-25 DIAGNOSIS — R10.84 GENERALIZED ABDOMINAL PAIN: ICD-10-CM

## 2023-09-25 DIAGNOSIS — F31.9 BIPOLAR 1 DISORDER (MULTI): ICD-10-CM

## 2023-09-25 DIAGNOSIS — J30.1 SEASONAL ALLERGIC RHINITIS DUE TO POLLEN: ICD-10-CM

## 2023-09-25 DIAGNOSIS — G40.009 PARTIAL IDIOPATHIC EPILEPSY WITH SEIZURES OF LOCALIZED ONSET, NOT INTRACTABLE, WITHOUT STATUS EPILEPTICUS (MULTI): ICD-10-CM

## 2023-09-25 PROCEDURE — 99214 OFFICE O/P EST MOD 30 MIN: CPT | Performed by: FAMILY MEDICINE

## 2023-09-25 RX ORDER — LIDOCAINE 50 MG/G
2 PATCH TOPICAL DAILY
Qty: 60 PATCH | Refills: 2 | Status: SHIPPED | OUTPATIENT
Start: 2023-09-25

## 2023-09-25 RX ORDER — OXYCODONE AND ACETAMINOPHEN 5; 325 MG/1; MG/1
1 TABLET ORAL EVERY 6 HOURS PRN
Qty: 45 TABLET | Refills: 0 | Status: SHIPPED | OUTPATIENT
Start: 2023-09-25 | End: 2023-10-05 | Stop reason: SDUPTHER

## 2023-09-25 RX ORDER — OXYCODONE AND ACETAMINOPHEN 7.5; 325 MG/1; MG/1
1 TABLET ORAL EVERY 8 HOURS PRN
Qty: 45 TABLET | Refills: 0 | Status: CANCELLED | OUTPATIENT
Start: 2023-09-25 | End: 2023-10-10

## 2023-09-25 NOTE — PROGRESS NOTES
Subjective   Patient ID: Pretty Devine is a 28 y.o. female who presents for Follow-up.    Pt c/o body aches    Refill medication.         Review of Systems  12 Systems have been reviewed as follows.  Constitutional: Fever, weight gain, weight loss, appetite change, night sweats, fatigue, chills.  Eyes : blurry, double vision, vision, loss, tearing, redness, pain, sensitivity to light, glaucoma.  Ears, nose, mouth, and throat: Hearing loss, ringing in the ears, ear pain, nasal congestion, nasal drainage, nosebleeds, mouth, throat, irritation tooth problem.  Cardiovascular :chest pain, pressure, heart racing, palpitations, sweating, leg swelling, high or low blood pressure  Pulmonary: Cough, yellow or green sputum, blood and sputum, shortness of breath, wheezing  Gastrointestinal: Nausea, vomiting, diarrhea, constipation, pain, blood in stool, or vomitus, heartburn, difficulty swallowing  Genitourinary: incontinence, abnormal bleeding, abnormal discharge, urinary frequency, urinary hesitancy, pain, impotence sexual problem, infection, urinary retention  Musculoskeletal: Pain, stiffness, joint, redness or warmth, arthritis, back pain, weakness, muscle wasting, sprain or fracture  Neuro: Weight weakness, dizziness, change in voice, change in taste change in vision, change in hearing, loss, or change of sensation, trouble walking, balance problems coordination problems, shaking, speech problem  Endocrine , cold or heat intolerance, blood sugar problem, weight gain or loss missed periods hot flashes, sweats, change in body hair, change in libido, increased thirst, increased urination  Heme/lymph: Swelling, bleeding, problem anemia, bruising, enlarged lymph nodes  Allergic/immunologic: H. plus nasal drip, watery itchy eyes, nasal drainage, immunosuppressed  The above were reviewed and noted negative except as noted in HPI and Problem List.    Objective   There were no vitals taken for this visit.    Physical  Exam  Constitutional: Well developed, well nourished, alert and in no acute distress   Eyes: Normal external exam. Pupils equally round and reactive to light with normal accommodation and extraocular movements intact.  Neck: Supple, no lymphadenopathy or masses.   Cardiovascular: Regular rate and rhythm, normal S1 and S2, no murmurs, gallops, or rubs. Radial pulses normal. No peripheral edema.  Pulmonary: No respiratory distress, lungs clear to auscultation bilaterally. No wheezes, rhonchi, rales.  Abdomen: soft,non tender, non distended, without masses or HSM  Skin: Warm, well perfused, normal skin turgor and color.   Neurologic: Cranial nerves II-XII grossly intact.   Psychiatric: Mood calm and affect normal  Musculoskeletal: Moving all extremities without restriction    Assessment/Plan   Problem List Items Addressed This Visit             ICD-10-CM    Allergic rhinitis J30.9    Relevant Orders    Follow Up In [facility] - PCP - Established    Anxiety disorder F41.9    Relevant Orders    Follow Up In [facility] - PCP - Established    Epilepsy (CMS/[facility]) G40.909    Relevant Orders    Follow Up In [facility] - PCP - Established    Hyperlipidemia E78.5    Relevant Orders    Follow Up In [facility] - PCP - Established    Intractable migraine with aura with status migrainosus G43.111    Relevant Orders    Follow Up In [facility] - PCP - Established    Lupus (CMS/[facility]) M32.9    Relevant Orders    Follow Up In [facility] - PCP - Established    Osler hemorrhagic telangiectasia syndrome (CMS/[facility]) I78.0    Relevant Medications    oxyCODONE-acetaminophen (Percocet) 5-325 mg tablet    Other Relevant Orders    Follow Up In [facility] - PCP - Established    Abdominal pain R10.9    Relevant Medications    oxyCODONE-acetaminophen (Percocet) 5-325 mg tablet    lidocaine (Lidoderm) 5 % patch    Other Relevant Orders    Follow Up In [facility] -  PCP - Established    Follow Up In [facility] - PCP - Established    Bipolar 1 disorder (CMS/HCC) F31.9    Relevant Orders    Follow Up In [facility] - PCP - Established    AVM (arteriovenous malformation) Q27.30    Relevant Orders    Follow Up In [facility] - PCP - Established    Palpitations R00.2    Relevant Orders    Follow Up In [facility] - PCP - Established    GERD (gastroesophageal reflux disease) K21.9    Relevant Orders    Follow Up In [facility] - PCP - Established    Cellulitis of chest wall L03.313    Relevant Orders    Follow Up In [facility] - PCP - Established    POTS (postural orthostatic tachycardia syndrome) G90.A    Relevant Orders    Follow Up In [facility] - PCP - Established     Continue current medications and therapy for chronic medical conditions    Virtual Visit - Audio and Visual Communication Real Time     I have personally reviewed the OARRS report with the patient and have considered the risk of abuse, addiction, dependence and diversion.    Patient's use of medication is allowing patient to be able to perform ADL's. Patient is always being evaluated for the possibility of lowering the medication dosage.    Decrease perc to 5mg tid prn    UDS next

## 2023-10-05 ENCOUNTER — OFFICE VISIT (OUTPATIENT)
Dept: PRIMARY CARE | Facility: CLINIC | Age: 28
End: 2023-10-05
Payer: COMMERCIAL

## 2023-10-05 VITALS
HEART RATE: 96 BPM | BODY MASS INDEX: 21.28 KG/M2 | DIASTOLIC BLOOD PRESSURE: 82 MMHG | SYSTOLIC BLOOD PRESSURE: 112 MMHG | OXYGEN SATURATION: 99 % | WEIGHT: 124 LBS

## 2023-10-05 DIAGNOSIS — Z02.89 MEDICATION MANAGEMENT CONTRACT AGREEMENT: ICD-10-CM

## 2023-10-05 DIAGNOSIS — I78.0 OSLER HEMORRHAGIC TELANGIECTASIA SYNDROME (CMS-HCC): ICD-10-CM

## 2023-10-05 DIAGNOSIS — R10.84 GENERALIZED ABDOMINAL PAIN: ICD-10-CM

## 2023-10-05 DIAGNOSIS — M79.7 FIBROMYALGIA: ICD-10-CM

## 2023-10-05 DIAGNOSIS — G90.A POTS (POSTURAL ORTHOSTATIC TACHYCARDIA SYNDROME): ICD-10-CM

## 2023-10-05 DIAGNOSIS — Z79.899 MEDICATION MANAGEMENT: ICD-10-CM

## 2023-10-05 DIAGNOSIS — F41.1 GENERALIZED ANXIETY DISORDER: ICD-10-CM

## 2023-10-05 DIAGNOSIS — R00.0 TACHYCARDIA: ICD-10-CM

## 2023-10-05 DIAGNOSIS — F33.41 RECURRENT MAJOR DEPRESSIVE DISORDER, IN PARTIAL REMISSION (CMS-HCC): ICD-10-CM

## 2023-10-05 DIAGNOSIS — F31.9 BIPOLAR 1 DISORDER (MULTI): ICD-10-CM

## 2023-10-05 DIAGNOSIS — M32.9 LUPUS (MULTI): ICD-10-CM

## 2023-10-05 DIAGNOSIS — I15.9 SECONDARY HYPERTENSION: ICD-10-CM

## 2023-10-05 PROBLEM — E44.1 MALNUTRITION OF MILD DEGREE (MULTI): Status: ACTIVE | Noted: 2023-07-19

## 2023-10-05 PROBLEM — Z86.711 HISTORY OF PULMONARY EMBOLUS (PE): Status: ACTIVE | Noted: 2023-08-26

## 2023-10-05 PROBLEM — K92.0 HEMATEMESIS OF FRESH BLOOD: Status: ACTIVE | Noted: 2023-09-19

## 2023-10-05 PROBLEM — F32.9 MAJOR DEPRESSIVE DISORDER, SINGLE EPISODE, UNSPECIFIED: Status: RESOLVED | Noted: 2019-03-29 | Resolved: 2023-10-05

## 2023-10-05 PROBLEM — I47.11 INAPPROPRIATE SINUS TACHYCARDIA (CMS-HCC): Status: RESOLVED | Noted: 2023-03-07 | Resolved: 2023-10-05

## 2023-10-05 PROBLEM — T80.219A INFECTION DUE TO PORT-A-CATH: Status: ACTIVE | Noted: 2023-08-27

## 2023-10-05 PROBLEM — L03.313 CELLULITIS OF CHEST WALL: Status: RESOLVED | Noted: 2023-06-28 | Resolved: 2023-10-05

## 2023-10-05 PROBLEM — F32.A DEPRESSION: Status: RESOLVED | Noted: 2023-03-07 | Resolved: 2023-10-05

## 2023-10-05 PROCEDURE — 80354 DRUG SCREENING FENTANYL: CPT

## 2023-10-05 PROCEDURE — 99214 OFFICE O/P EST MOD 30 MIN: CPT | Performed by: FAMILY MEDICINE

## 2023-10-05 PROCEDURE — 80307 DRUG TEST PRSMV CHEM ANLYZR: CPT

## 2023-10-05 PROCEDURE — 80373 DRUG SCREENING TRAMADOL: CPT

## 2023-10-05 PROCEDURE — 82570 ASSAY OF URINE CREATININE: CPT

## 2023-10-05 PROCEDURE — 80358 DRUG SCREENING METHADONE: CPT

## 2023-10-05 PROCEDURE — 80365 DRUG SCREENING OXYCODONE: CPT

## 2023-10-05 PROCEDURE — 80346 BENZODIAZEPINES1-12: CPT

## 2023-10-05 PROCEDURE — 80361 OPIATES 1 OR MORE: CPT

## 2023-10-05 PROCEDURE — 80368 SEDATIVE HYPNOTICS: CPT

## 2023-10-05 RX ORDER — OXYCODONE AND ACETAMINOPHEN 5; 325 MG/1; MG/1
1 TABLET ORAL EVERY 8 HOURS PRN
Qty: 45 TABLET | Refills: 0 | Status: SHIPPED | OUTPATIENT
Start: 2023-10-05 | End: 2023-10-19 | Stop reason: SDUPTHER

## 2023-10-05 RX ORDER — METOPROLOL SUCCINATE 25 MG/1
25 TABLET, EXTENDED RELEASE ORAL 2 TIMES DAILY
Qty: 60 TABLET | Refills: 3 | Status: SHIPPED | OUTPATIENT
Start: 2023-10-05

## 2023-10-05 ASSESSMENT — ENCOUNTER SYMPTOMS
ABDOMINAL PAIN: 1
PALPITATIONS: 1
HEADACHES: 1
PAIN: 1
DEPRESSED MOOD: 1
PANIC: 1

## 2023-10-05 NOTE — PROGRESS NOTES
Subjective   Patient ID: Pretty Devine is a 28 y.o. female who presents for Anxiety and Fibromyalgia.    Anxiety  Presents for follow-up visit. Symptoms include depressed mood, palpitations and panic. Symptoms occur most days. The severity of symptoms is mild. The quality of sleep is fair.       Pain  This is a chronic problem. The current episode started more than 1 year ago. The problem occurs daily. The problem is unchanged. Associated symptoms include abdominal pain and headaches. Past treatments include prescription narcotic and rest.        Review of Systems   Cardiovascular:  Positive for palpitations.   Gastrointestinal:  Positive for abdominal pain.   Neurological:  Positive for headaches.     12 Systems have been reviewed as follows.  Constitutional: Fever, weight gain, weight loss, appetite change, night sweats, fatigue, chills.  Eyes : blurry, double vision, vision, loss, tearing, redness, pain, sensitivity to light, glaucoma.  Ears, nose, mouth, and throat: Hearing loss, ringing in the ears, ear pain, nasal congestion, nasal drainage, nosebleeds, mouth, throat, irritation tooth problem.  Cardiovascular :chest pain, pressure, heart racing, palpitations, sweating, leg swelling, high or low blood pressure  Pulmonary: Cough, yellow or green sputum, blood and sputum, shortness of breath, wheezing  Gastrointestinal: Nausea, vomiting, diarrhea, constipation, pain, blood in stool, or vomitus, heartburn, difficulty swallowing  Genitourinary: incontinence, abnormal bleeding, abnormal discharge, urinary frequency, urinary hesitancy, pain, impotence sexual problem, infection, urinary retention  Musculoskeletal: Pain, stiffness, joint, redness or warmth, arthritis, back pain, weakness, muscle wasting, sprain or fracture  Neuro: Weight weakness, dizziness, change in voice, change in taste change in vision, change in hearing, loss, or change of sensation, trouble walking, balance problems coordination problems,  shaking, speech problem  Endocrine , cold or heat intolerance, blood sugar problem, weight gain or loss missed periods hot flashes, sweats, change in body hair, change in libido, increased thirst, increased urination  Heme/lymph: Swelling, bleeding, problem anemia, bruising, enlarged lymph nodes  Allergic/immunologic: H. plus nasal drip, watery itchy eyes, nasal drainage, immunosuppressed  The above were reviewed and noted negative except as noted in HPI and Problem List.    Objective   /82   Pulse 96   Wt 56.2 kg (124 lb)   SpO2 99%   BMI 21.28 kg/m²     Physical Exam  Constitutional: Well developed, well nourished, alert and in no acute distress   Eyes: Normal external exam. Pupils equally round and reactive to light with normal accommodation and extraocular movements intact.  Neck: Supple, no lymphadenopathy or masses.   Cardiovascular: Regular rate and rhythm, normal S1 and S2, no murmurs, gallops, or rubs. Radial pulses normal. No peripheral edema.  Pulmonary: No respiratory distress, lungs clear to auscultation bilaterally. No wheezes, rhonchi, rales.  Abdomen: soft,non tender, non distended, without masses or HSM  Skin: Warm, well perfused, normal skin turgor and color.   Neurologic: Cranial nerves II-XII grossly intact.   Psychiatric: Mood calm and affect normal  Musculoskeletal: Moving all extremities without restriction    Assessment/Plan   Problem List Items Addressed This Visit             ICD-10-CM    Anxiety disorder F41.9    Relevant Orders    Follow Up In [facility] - PCP - Established    Fibromyalgia M79.7    Relevant Orders    Follow Up In [facility] - PCP - Established    Lupus (CMS/[facility]) M32.9    Relevant Orders    Follow Up In [facility] - PCP - Established    Osler hemorrhagic telangiectasia syndrome (CMS/[facility]) I78.0    Relevant Medications    oxyCODONE-acetaminophen (Percocet) 5-325 mg tablet    Other Relevant Orders    Follow Up In [facility] - PCP - Established    Abdominal pain R10.9    Relevant Medications    oxyCODONE-acetaminophen (Percocet) 5-325 mg tablet    Other Relevant Orders    Follow Up In [facility] - PCP - Established    Bipolar 1 disorder (CMS/[facility]) F31.9    Relevant Orders    Follow Up In [facility] - PCP - Established    Tachycardia R00.0    Relevant Orders    Follow Up In [facility] - PCP - Established    POTS (postural orthostatic tachycardia syndrome) G90.A    Relevant Orders    Follow Up In [facility] - PCP - Established    Recurrent major depressive disorder, in partial remission (CMS/[facility]) F33.41    Relevant Orders    Follow Up In [facility] - PCP - Established     Other Visit Diagnoses         Codes    Secondary hypertension     I15.9    Relevant Medications    metoprolol succinate XL (Toprol-XL) 25 mg 24 hr tablet    Other Relevant Orders    Follow Up In [facility] - PCP - Established    Medication management contract agreement     Z02.89    Relevant Orders    Follow Up In [facility] - PCP - Established    Medication management     Z79.899    Relevant Orders    Opiate/Opioid/Benzo Extended Prescription Compliance    OOB Internal Tracking    Follow Up In [facility] - PCP - Established            Continue current medications and therapy for chronic medical conditions    Provider Attestation - Scribe documentation    All medical record entries made by the Scribe were at my direction and personally dictated by me. I have reviewed the chart and agree that the record accurately reflects my personal performance of the history, physical exam, discussion and plan.    Scribe Attestation  By signing my name below, I, Eric Aguilera MD   , Scribe   attest that this documentation has been prepared under the direction and in the presence of Eric Aguilera MD.       UDS today    Decrease percocet to tid    I have personally reviewed the OARRS  report with the patient and have considered the risk of abuse, addiction, dependence and diversion.    Patient's use of medication is allowing patient to be able to perform ADL's. Patient is always being evaluated for the possibility of lowering the medication dosage.

## 2023-10-10 ENCOUNTER — PATIENT OUTREACH (OUTPATIENT)
Dept: PRIMARY CARE | Facility: CLINIC | Age: 28
End: 2023-10-10
Payer: COMMERCIAL

## 2023-10-10 LAB
1OH-MIDAZOLAM UR CFM-MCNC: <25 NG/ML
6MAM UR CFM-MCNC: <25 NG/ML
7AMINOCLONAZEPAM UR CFM-MCNC: <25 NG/ML
A-OH ALPRAZ UR CFM-MCNC: <25 NG/ML
ALPRAZ UR CFM-MCNC: <25 NG/ML
CHLORDIAZEP UR CFM-MCNC: <25 NG/ML
CLONAZEPAM UR CFM-MCNC: <25 NG/ML
CODEINE UR CFM-MCNC: <50 NG/ML
DIAZEPAM UR CFM-MCNC: <25 NG/ML
EDDP UR CFM-MCNC: <25 NG/ML
FENTANYL UR CFM-MCNC: <2.5 NG/ML
HYDROCODONE CTO UR CFM-MCNC: <25 NG/ML
HYDROMORPHONE UR CFM-MCNC: <25 NG/ML
LORAZEPAM UR CFM-MCNC: <25 NG/ML
METHADONE UR CFM-MCNC: <25 NG/ML
MIDAZOLAM UR CFM-MCNC: <25 NG/ML
MORPHINE UR CFM-MCNC: <50 NG/ML
NORDIAZEPAM UR CFM-MCNC: <25 NG/ML
NORFENTANYL UR CFM-MCNC: <2.5 NG/ML
NORHYDROCODONE UR CFM-MCNC: <25 NG/ML
NOROXYCODONE UR CFM-MCNC: 517 NG/ML
NORTRAMADOL UR-MCNC: <50 NG/ML
OXAZEPAM UR CFM-MCNC: <25 NG/ML
OXYCODONE UR CFM-MCNC: 29 NG/ML
OXYMORPHONE UR CFM-MCNC: 984 NG/ML
TEMAZEPAM UR CFM-MCNC: <25 NG/ML
TRAMADOL UR CFM-MCNC: <50 NG/ML
ZOLPIDEM UR CFM-MCNC: <25 NG/ML
ZOLPIDEM UR-MCNC: <25 NG/ML

## 2023-10-10 NOTE — PROGRESS NOTES
Unable to reach patient for call back after patient's follow up appointment with PCP on 10/5/23.   LVM with call back number for patient to call if needed    Next appt :      Visit Type: Primary Care Established          Date: 11/3/2023                  Dept: [facility]                  Provider: Eric Aguilera                  Time: 9:30 AM     Say she was keep for observation on 10/1/23 so adjusted her enrollment dates

## 2023-10-16 ENCOUNTER — APPOINTMENT (OUTPATIENT)
Dept: RADIOLOGY | Facility: HOSPITAL | Age: 28
End: 2023-10-16
Payer: COMMERCIAL

## 2023-10-16 ENCOUNTER — HOSPITAL ENCOUNTER (OUTPATIENT)
Facility: HOSPITAL | Age: 28
Setting detail: OBSERVATION
Discharge: HOME | End: 2023-10-16
Attending: STUDENT IN AN ORGANIZED HEALTH CARE EDUCATION/TRAINING PROGRAM | Admitting: INTERNAL MEDICINE
Payer: COMMERCIAL

## 2023-10-16 VITALS
HEART RATE: 92 BPM | WEIGHT: 125 LBS | BODY MASS INDEX: 21.34 KG/M2 | TEMPERATURE: 97.2 F | DIASTOLIC BLOOD PRESSURE: 61 MMHG | SYSTOLIC BLOOD PRESSURE: 101 MMHG | OXYGEN SATURATION: 100 % | RESPIRATION RATE: 18 BRPM | HEIGHT: 64 IN

## 2023-10-16 DIAGNOSIS — R04.2 HEMOPTYSIS: Primary | ICD-10-CM

## 2023-10-16 PROBLEM — F44.5 PSYCHOGENIC NONEPILEPTIC SEIZURE: Status: ACTIVE | Noted: 2018-12-07

## 2023-10-16 LAB
ALBUMIN SERPL BCP-MCNC: 4.5 G/DL (ref 3.4–5)
ALP SERPL-CCNC: 76 U/L (ref 33–110)
ALT SERPL W P-5'-P-CCNC: 26 U/L (ref 7–45)
ANION GAP SERPL CALC-SCNC: 13 MMOL/L (ref 10–20)
AST SERPL W P-5'-P-CCNC: 27 U/L (ref 9–39)
B-HCG SERPL-ACNC: <2 MIU/ML
BASOPHILS # BLD AUTO: 0.01 X10*3/UL (ref 0–0.1)
BASOPHILS NFR BLD AUTO: 0.3 %
BILIRUB SERPL-MCNC: 0.3 MG/DL (ref 0–1.2)
BUN SERPL-MCNC: 14 MG/DL (ref 6–23)
CALCIUM SERPL-MCNC: 9.2 MG/DL (ref 8.6–10.3)
CARDIAC TROPONIN I PNL SERPL HS: 6 NG/L (ref 0–13)
CARDIAC TROPONIN I PNL SERPL HS: 6 NG/L (ref 0–13)
CHLORIDE SERPL-SCNC: 102 MMOL/L (ref 98–107)
CO2 SERPL-SCNC: 23 MMOL/L (ref 21–32)
CREAT SERPL-MCNC: 0.69 MG/DL (ref 0.5–1.05)
EOSINOPHIL # BLD AUTO: 0.02 X10*3/UL (ref 0–0.7)
EOSINOPHIL NFR BLD AUTO: 0.7 %
ERYTHROCYTE [DISTWIDTH] IN BLOOD BY AUTOMATED COUNT: 17.1 % (ref 11.5–14.5)
GFR SERPL CREATININE-BSD FRML MDRD: >90 ML/MIN/1.73M*2
GLUCOSE SERPL-MCNC: 102 MG/DL (ref 74–99)
HCT VFR BLD AUTO: 29.9 % (ref 36–46)
HCT VFR BLD AUTO: 32.1 % (ref 36–46)
HGB BLD-MCNC: 9.2 G/DL (ref 12–16)
HGB BLD-MCNC: 9.8 G/DL (ref 12–16)
IMM GRANULOCYTES # BLD AUTO: 0.01 X10*3/UL (ref 0–0.7)
IMM GRANULOCYTES NFR BLD AUTO: 0.3 % (ref 0–0.9)
LYMPHOCYTES # BLD AUTO: 0.79 X10*3/UL (ref 1.2–4.8)
LYMPHOCYTES NFR BLD AUTO: 26 %
MAGNESIUM SERPL-MCNC: 1.77 MG/DL (ref 1.6–2.4)
MCH RBC QN AUTO: 23.2 PG (ref 26–34)
MCHC RBC AUTO-ENTMCNC: 30.5 G/DL (ref 32–36)
MCV RBC AUTO: 76 FL (ref 80–100)
MONOCYTES # BLD AUTO: 0.35 X10*3/UL (ref 0.1–1)
MONOCYTES NFR BLD AUTO: 11.5 %
NEUTROPHILS # BLD AUTO: 1.86 X10*3/UL (ref 1.2–7.7)
NEUTROPHILS NFR BLD AUTO: 61.2 %
NRBC BLD-RTO: 0 /100 WBCS (ref 0–0)
PLATELET # BLD AUTO: 283 X10*3/UL (ref 150–450)
PMV BLD AUTO: 9.9 FL (ref 7.5–11.5)
POTASSIUM SERPL-SCNC: 3.3 MMOL/L (ref 3.5–5.3)
PROT SERPL-MCNC: 7.6 G/DL (ref 6.4–8.2)
RBC # BLD AUTO: 4.23 X10*6/UL (ref 4–5.2)
SODIUM SERPL-SCNC: 135 MMOL/L (ref 136–145)
WBC # BLD AUTO: 3 X10*3/UL (ref 4.4–11.3)

## 2023-10-16 PROCEDURE — 96376 TX/PRO/DX INJ SAME DRUG ADON: CPT

## 2023-10-16 PROCEDURE — 71275 CT ANGIOGRAPHY CHEST: CPT | Mod: ME

## 2023-10-16 PROCEDURE — 2500000004 HC RX 250 GENERAL PHARMACY W/ HCPCS (ALT 636 FOR OP/ED): Performed by: STUDENT IN AN ORGANIZED HEALTH CARE EDUCATION/TRAINING PROGRAM

## 2023-10-16 PROCEDURE — 84484 ASSAY OF TROPONIN QUANT: CPT | Mod: 59 | Performed by: STUDENT IN AN ORGANIZED HEALTH CARE EDUCATION/TRAINING PROGRAM

## 2023-10-16 PROCEDURE — 36415 COLL VENOUS BLD VENIPUNCTURE: CPT | Performed by: STUDENT IN AN ORGANIZED HEALTH CARE EDUCATION/TRAINING PROGRAM

## 2023-10-16 PROCEDURE — 85018 HEMOGLOBIN: CPT | Mod: 59 | Performed by: STUDENT IN AN ORGANIZED HEALTH CARE EDUCATION/TRAINING PROGRAM

## 2023-10-16 PROCEDURE — 2500000001 HC RX 250 WO HCPCS SELF ADMINISTERED DRUGS (ALT 637 FOR MEDICARE OP): Performed by: INTERNAL MEDICINE

## 2023-10-16 PROCEDURE — 84702 CHORIONIC GONADOTROPIN TEST: CPT | Performed by: STUDENT IN AN ORGANIZED HEALTH CARE EDUCATION/TRAINING PROGRAM

## 2023-10-16 PROCEDURE — G0378 HOSPITAL OBSERVATION PER HR: HCPCS

## 2023-10-16 PROCEDURE — 96374 THER/PROPH/DIAG INJ IV PUSH: CPT

## 2023-10-16 PROCEDURE — 96361 HYDRATE IV INFUSION ADD-ON: CPT

## 2023-10-16 PROCEDURE — 83735 ASSAY OF MAGNESIUM: CPT | Performed by: STUDENT IN AN ORGANIZED HEALTH CARE EDUCATION/TRAINING PROGRAM

## 2023-10-16 PROCEDURE — 99223 1ST HOSP IP/OBS HIGH 75: CPT | Performed by: INTERNAL MEDICINE

## 2023-10-16 PROCEDURE — 71275 CT ANGIOGRAPHY CHEST: CPT | Performed by: RADIOLOGY

## 2023-10-16 PROCEDURE — 93005 ELECTROCARDIOGRAM TRACING: CPT

## 2023-10-16 PROCEDURE — 85025 COMPLETE CBC W/AUTO DIFF WBC: CPT | Performed by: STUDENT IN AN ORGANIZED HEALTH CARE EDUCATION/TRAINING PROGRAM

## 2023-10-16 PROCEDURE — 85014 HEMATOCRIT: CPT | Mod: 59 | Performed by: STUDENT IN AN ORGANIZED HEALTH CARE EDUCATION/TRAINING PROGRAM

## 2023-10-16 PROCEDURE — 2580000001 HC RX 258 IV SOLUTIONS: Performed by: STUDENT IN AN ORGANIZED HEALTH CARE EDUCATION/TRAINING PROGRAM

## 2023-10-16 PROCEDURE — 93010 ELECTROCARDIOGRAM REPORT: CPT | Performed by: STUDENT IN AN ORGANIZED HEALTH CARE EDUCATION/TRAINING PROGRAM

## 2023-10-16 PROCEDURE — 84484 ASSAY OF TROPONIN QUANT: CPT | Performed by: STUDENT IN AN ORGANIZED HEALTH CARE EDUCATION/TRAINING PROGRAM

## 2023-10-16 PROCEDURE — 99238 HOSP IP/OBS DSCHRG MGMT 30/<: CPT | Performed by: INTERNAL MEDICINE

## 2023-10-16 PROCEDURE — 99285 EMERGENCY DEPT VISIT HI MDM: CPT | Performed by: STUDENT IN AN ORGANIZED HEALTH CARE EDUCATION/TRAINING PROGRAM

## 2023-10-16 PROCEDURE — 99285 EMERGENCY DEPT VISIT HI MDM: CPT | Mod: 25 | Performed by: STUDENT IN AN ORGANIZED HEALTH CARE EDUCATION/TRAINING PROGRAM

## 2023-10-16 PROCEDURE — 80053 COMPREHEN METABOLIC PANEL: CPT | Performed by: STUDENT IN AN ORGANIZED HEALTH CARE EDUCATION/TRAINING PROGRAM

## 2023-10-16 PROCEDURE — 2550000001 HC RX 255 CONTRASTS: Performed by: STUDENT IN AN ORGANIZED HEALTH CARE EDUCATION/TRAINING PROGRAM

## 2023-10-16 PROCEDURE — 2500000001 HC RX 250 WO HCPCS SELF ADMINISTERED DRUGS (ALT 637 FOR MEDICARE OP): Performed by: STUDENT IN AN ORGANIZED HEALTH CARE EDUCATION/TRAINING PROGRAM

## 2023-10-16 RX ORDER — SODIUM CHLORIDE 9 MG/ML
75 INJECTION, SOLUTION INTRAVENOUS CONTINUOUS
Status: CANCELLED | OUTPATIENT
Start: 2023-10-16 | End: 2023-10-16

## 2023-10-16 RX ORDER — ONDANSETRON HYDROCHLORIDE 2 MG/ML
4 INJECTION, SOLUTION INTRAVENOUS EVERY 8 HOURS PRN
Status: CANCELLED | OUTPATIENT
Start: 2023-10-16

## 2023-10-16 RX ORDER — ACETAMINOPHEN 325 MG/1
650 TABLET ORAL EVERY 4 HOURS PRN
Status: CANCELLED | OUTPATIENT
Start: 2023-10-16

## 2023-10-16 RX ORDER — MORPHINE SULFATE 2 MG/ML
2 INJECTION, SOLUTION INTRAMUSCULAR; INTRAVENOUS EVERY 4 HOURS PRN
Status: CANCELLED | OUTPATIENT
Start: 2023-10-16

## 2023-10-16 RX ORDER — CITALOPRAM 20 MG/1
10 TABLET, FILM COATED ORAL DAILY
Status: DISCONTINUED | OUTPATIENT
Start: 2023-10-16 | End: 2023-10-16 | Stop reason: HOSPADM

## 2023-10-16 RX ORDER — ALBUTEROL SULFATE 0.83 MG/ML
3 SOLUTION RESPIRATORY (INHALATION) EVERY 6 HOURS PRN
Status: DISCONTINUED | OUTPATIENT
Start: 2023-10-16 | End: 2023-10-16 | Stop reason: HOSPADM

## 2023-10-16 RX ORDER — LANOLIN ALCOHOL/MO/W.PET/CERES
400 CREAM (GRAM) TOPICAL DAILY
Status: DISCONTINUED | OUTPATIENT
Start: 2023-10-16 | End: 2023-10-16 | Stop reason: HOSPADM

## 2023-10-16 RX ORDER — ONDANSETRON 4 MG/1
4 TABLET, ORALLY DISINTEGRATING ORAL EVERY 8 HOURS PRN
Status: CANCELLED | OUTPATIENT
Start: 2023-10-16

## 2023-10-16 RX ORDER — POTASSIUM CHLORIDE 1.5 G/1.58G
40 POWDER, FOR SOLUTION ORAL ONCE
Status: DISCONTINUED | OUTPATIENT
Start: 2023-10-16 | End: 2023-10-16

## 2023-10-16 RX ORDER — METOPROLOL SUCCINATE 25 MG/1
25 TABLET, EXTENDED RELEASE ORAL 2 TIMES DAILY
Status: DISCONTINUED | OUTPATIENT
Start: 2023-10-16 | End: 2023-10-16 | Stop reason: HOSPADM

## 2023-10-16 RX ORDER — OXYCODONE AND ACETAMINOPHEN 5; 325 MG/1; MG/1
1 TABLET ORAL EVERY 8 HOURS PRN
Status: DISCONTINUED | OUTPATIENT
Start: 2023-10-16 | End: 2023-10-16 | Stop reason: HOSPADM

## 2023-10-16 RX ORDER — ACETAMINOPHEN 650 MG/1
650 SUPPOSITORY RECTAL EVERY 4 HOURS PRN
Status: CANCELLED | OUTPATIENT
Start: 2023-10-16

## 2023-10-16 RX ORDER — RISPERIDONE 2 MG/1
4 TABLET ORAL DAILY
Status: DISCONTINUED | OUTPATIENT
Start: 2023-10-16 | End: 2023-10-16 | Stop reason: HOSPADM

## 2023-10-16 RX ORDER — HYDROXYZINE HYDROCHLORIDE 25 MG/1
25 TABLET, FILM COATED ORAL 3 TIMES DAILY PRN
Status: DISCONTINUED | OUTPATIENT
Start: 2023-10-16 | End: 2023-10-16 | Stop reason: HOSPADM

## 2023-10-16 RX ORDER — MIDODRINE HYDROCHLORIDE 5 MG/1
5 TABLET ORAL 3 TIMES DAILY
Status: DISCONTINUED | OUTPATIENT
Start: 2023-10-16 | End: 2023-10-16 | Stop reason: HOSPADM

## 2023-10-16 RX ORDER — LIDOCAINE 560 MG/1
1 PATCH PERCUTANEOUS; TOPICAL; TRANSDERMAL DAILY
Status: DISCONTINUED | OUTPATIENT
Start: 2023-10-16 | End: 2023-10-16 | Stop reason: HOSPADM

## 2023-10-16 RX ORDER — MORPHINE SULFATE 4 MG/ML
4 INJECTION, SOLUTION INTRAMUSCULAR; INTRAVENOUS EVERY 4 HOURS PRN
Status: CANCELLED | OUTPATIENT
Start: 2023-10-16

## 2023-10-16 RX ORDER — POTASSIUM CHLORIDE 20 MEQ/1
40 TABLET, EXTENDED RELEASE ORAL ONCE
Status: COMPLETED | OUTPATIENT
Start: 2023-10-16 | End: 2023-10-16

## 2023-10-16 RX ORDER — POLYETHYLENE GLYCOL 3350 17 G/17G
17 POWDER, FOR SOLUTION ORAL DAILY PRN
Status: CANCELLED | OUTPATIENT
Start: 2023-10-16

## 2023-10-16 RX ORDER — ONDANSETRON 4 MG/1
8 TABLET, ORALLY DISINTEGRATING ORAL EVERY 8 HOURS PRN
Status: DISCONTINUED | OUTPATIENT
Start: 2023-10-16 | End: 2023-10-16 | Stop reason: HOSPADM

## 2023-10-16 RX ORDER — PREGABALIN 150 MG/1
150 CAPSULE ORAL 3 TIMES DAILY
Status: DISCONTINUED | OUTPATIENT
Start: 2023-10-16 | End: 2023-10-16 | Stop reason: HOSPADM

## 2023-10-16 RX ORDER — MORPHINE SULFATE 4 MG/ML
4 INJECTION, SOLUTION INTRAMUSCULAR; INTRAVENOUS ONCE
Status: COMPLETED | OUTPATIENT
Start: 2023-10-16 | End: 2023-10-16

## 2023-10-16 RX ORDER — ACETAMINOPHEN 160 MG/5ML
650 SOLUTION ORAL EVERY 4 HOURS PRN
Status: CANCELLED | OUTPATIENT
Start: 2023-10-16

## 2023-10-16 RX ORDER — TRANEXAMIC ACID 650 MG/1
1300 TABLET ORAL 2 TIMES DAILY
Status: DISCONTINUED | OUTPATIENT
Start: 2023-10-16 | End: 2023-10-16 | Stop reason: HOSPADM

## 2023-10-16 RX ADMIN — SODIUM CHLORIDE, POTASSIUM CHLORIDE, SODIUM LACTATE AND CALCIUM CHLORIDE 1000 ML: 600; 310; 30; 20 INJECTION, SOLUTION INTRAVENOUS at 01:18

## 2023-10-16 RX ADMIN — MORPHINE SULFATE 4 MG: 4 INJECTION, SOLUTION INTRAMUSCULAR; INTRAVENOUS at 01:19

## 2023-10-16 RX ADMIN — OXYCODONE HYDROCHLORIDE AND ACETAMINOPHEN 1 TABLET: 5; 325 TABLET ORAL at 08:47

## 2023-10-16 RX ADMIN — POTASSIUM CHLORIDE 40 MEQ: 1500 TABLET, EXTENDED RELEASE ORAL at 06:02

## 2023-10-16 RX ADMIN — IOHEXOL 70 ML: 350 INJECTION, SOLUTION INTRAVENOUS at 02:07

## 2023-10-16 RX ADMIN — MORPHINE SULFATE 4 MG: 4 INJECTION, SOLUTION INTRAMUSCULAR; INTRAVENOUS at 03:49

## 2023-10-16 ASSESSMENT — LIFESTYLE VARIABLES
HAVE YOU EVER FELT YOU SHOULD CUT DOWN ON YOUR DRINKING: NO
EVER FELT BAD OR GUILTY ABOUT YOUR DRINKING: NO
EVER HAD A DRINK FIRST THING IN THE MORNING TO STEADY YOUR NERVES TO GET RID OF A HANGOVER: NO
REASON UNABLE TO ASSESS: NO
EVER FELT BAD OR GUILTY ABOUT YOUR DRINKING: NO
HAVE PEOPLE ANNOYED YOU BY CRITICIZING YOUR DRINKING: NO
EVER HAD A DRINK FIRST THING IN THE MORNING TO STEADY YOUR NERVES TO GET RID OF A HANGOVER: NO
HAVE YOU EVER FELT YOU SHOULD CUT DOWN ON YOUR DRINKING: NO
REASON UNABLE TO ASSESS: NO

## 2023-10-16 ASSESSMENT — COLUMBIA-SUICIDE SEVERITY RATING SCALE - C-SSRS
2. HAVE YOU ACTUALLY HAD ANY THOUGHTS OF KILLING YOURSELF?: NO
6. HAVE YOU EVER DONE ANYTHING, STARTED TO DO ANYTHING, OR PREPARED TO DO ANYTHING TO END YOUR LIFE?: NO
1. IN THE PAST MONTH, HAVE YOU WISHED YOU WERE DEAD OR WISHED YOU COULD GO TO SLEEP AND NOT WAKE UP?: NO

## 2023-10-16 ASSESSMENT — COGNITIVE AND FUNCTIONAL STATUS - GENERAL
DAILY ACTIVITIY SCORE: 24
MOBILITY SCORE: 24

## 2023-10-16 ASSESSMENT — PAIN SCALES - GENERAL
PAINLEVEL_OUTOF10: 3
PAINLEVEL_OUTOF10: 5 - MODERATE PAIN
PAINLEVEL_OUTOF10: 9
PAINLEVEL_OUTOF10: 5 - MODERATE PAIN
PAINLEVEL_OUTOF10: 3
PAINLEVEL_OUTOF10: 4
PAINLEVEL_OUTOF10: 2
PAINLEVEL_OUTOF10: 3
PAINLEVEL_OUTOF10: 2
PAINLEVEL_OUTOF10: 4

## 2023-10-16 ASSESSMENT — PAIN DESCRIPTION - DESCRIPTORS
DESCRIPTORS: ACHING;BURNING
DESCRIPTORS: BURNING;ACHING
DESCRIPTORS: ACHING

## 2023-10-16 ASSESSMENT — PAIN DESCRIPTION - ONSET: ONSET: GRADUAL

## 2023-10-16 ASSESSMENT — PAIN - FUNCTIONAL ASSESSMENT
PAIN_FUNCTIONAL_ASSESSMENT: 0-10

## 2023-10-16 ASSESSMENT — PAIN DESCRIPTION - LOCATION: LOCATION: CHEST

## 2023-10-16 ASSESSMENT — PAIN DESCRIPTION - PAIN TYPE: TYPE: ACUTE PAIN

## 2023-10-16 ASSESSMENT — PAIN DESCRIPTION - PROGRESSION: CLINICAL_PROGRESSION: GRADUALLY WORSENING

## 2023-10-16 ASSESSMENT — ENCOUNTER SYMPTOMS
VOMITING: 1
ABDOMINAL PAIN: 1
COUGH: 1

## 2023-10-16 ASSESSMENT — PAIN DESCRIPTION - ORIENTATION: ORIENTATION: UPPER

## 2023-10-16 ASSESSMENT — PAIN DESCRIPTION - FREQUENCY: FREQUENCY: INTERMITTENT

## 2023-10-16 NOTE — ED PROVIDER NOTES
HPI   Chief Complaint   Patient presents with    Chest Pain    Cough       28-year-old female with a past medical history of hereditary telangiectasias presenting to the ED for hemoptysis.  Patient was seen in the [place] ED earlier today for abdominal pain.  She had labs and a CT and was told that her symptoms are likely due to her telangiectasias.  Advised to return to the ED if her symptoms worsen and prior to arrival she developed hemoptysis.  She has had multiple episodes of coughing up dylan blood.  Endorses chest pain without dyspnea.  States she is compliant with her TXA which she takes twice daily.      History provided by:  Patient                      No data recorded                Patient History   Past Medical History:   Diagnosis Date    Acute upper respiratory infection, unspecified     Acute upper respiratory infection    Disruption of external operation (surgical) wound, not elsewhere classified, initial encounter 09/04/2019    Dehiscence of incision, initial encounter    Effusion, left hip 05/18/2015    Swelling of left hip joint    Encounter for general adult medical examination without abnormal findings 08/19/2020    Encounter for Medicare annual wellness exam    Encounter for other preprocedural examination     Other specified pre-operative examination    Encounter for other preprocedural examination     Pre-op testing    Fever presenting with conditions classified elsewhere 09/10/2018    Fever in other diseases    Fracture of unspecified part of neck of unspecified femur, initial encounter for closed fracture (CMS/[facility]) 05/06/2015    Femoral neck fracture    Jaw pain 03/03/2021    Mandible pain    Other conditions influencing health status 10/29/2019    Inflammation    Other specified postprocedural states     S/P coronary angiogram    Other symptoms and signs involving the musculoskeletal system 01/10/2017    Weakness of both lower extremities    Pain in unspecified ankle and joints of  unspecified foot 01/11/2022    Ankle pain    Pain in unspecified hip 04/09/2019    Hip pain    Pelvic and perineal pain 05/18/2015    Pelvic pain in female    Personal history of diseases of the blood and blood-forming organs and certain disorders involving the immune mechanism 04/02/2021    History of anemia    Personal history of diseases of the blood and blood-forming organs and certain disorders involving the immune mechanism 01/12/2022    History of anemia    Personal history of diseases of the skin and subcutaneous tissue 01/13/2022    History of folliculitis    Personal history of other diseases of the circulatory system 04/25/2019    History of paroxysmal supraventricular tachycardia    Personal history of other diseases of the digestive system 07/16/2020    History of hematemesis    Personal history of other diseases of the nervous system and sense organs 09/11/2019    History of ear pain    Personal history of other diseases of urinary system 11/12/2019    History of hematuria    Personal history of other drug therapy     History of drug therapy    Personal history of other drug therapy     History of drug therapy    Personal history of other endocrine, nutritional and metabolic disease 05/11/2016    History of iron deficiency    Personal history of other endocrine, nutritional and metabolic disease 08/21/2020    History of obesity    Personal history of other infectious and parasitic diseases     History of herpes labialis    Personal history of other mental and behavioral disorders 06/03/2021    History of depression    Personal history of other specified conditions 08/21/2020    History of persistent cough    Personal history of other specified conditions 09/06/2017    History of epistaxis    Personal history of other specified conditions 05/24/2020    History of jaundice    Personal history of other specified conditions 07/13/2020    History of dysphagia    Personal history of other venous thrombosis  and embolism 12/29/2022    History of deep venous thrombosis    Personal history of pulmonary embolism 03/29/2021    History of pulmonary embolism    Shortness of breath 09/10/2018    SOB (shortness of breath) on exertion    Trochanteric bursitis, unspecified hip 08/04/2015    Trochanteric bursitis     Past Surgical History:   Procedure Laterality Date    CT ABDOMEN PELVIS ANGIOGRAM W AND/OR WO IV CONTRAST  9/12/2019    CT ABDOMEN PELVIS ANGIOGRAM W AND/OR WO IV CONTRAST 9/12/2019 [facility]    CT ABDOMEN PELVIS ANGIOGRAM W AND/OR WO IV CONTRAST  10/22/2019    CT ABDOMEN PELVIS ANGIOGRAM W AND/OR WO IV CONTRAST 10/22/2019 [facility]    CT ABDOMEN PELVIS ANGIOGRAM W AND/OR WO IV CONTRAST  4/3/2022    CT ABDOMEN PELVIS ANGIOGRAM W AND/OR WO IV CONTRAST 4/3/2022 [facility]    CT HEAD ANGIO W AND WO IV CONTRAST  4/11/2019    CT HEAD ANGIO W AND WO IV CONTRAST 4/11/2019 [facility]    CT HEAD ANGIO W AND WO IV CONTRAST  9/26/2020    CT HEAD ANGIO W AND WO IV CONTRAST 9/26/2020 [facility]ENC[facility]GAC[facility]    CT HEAD ANGIO W AND WO IV CONTRAST  9/14/2022    CT HEAD ANGIO W AND WO IV CONTRAST 9/14/2022 [facility]    CT NECK ANGIO W AND WO IV CONTRAST  9/14/2022    CT NECK ANGIO W AND WO IV CONTRAST 9/14/2022 [facility]    IR INTERVENTION VENOUS EMBOLIZATION  2/19/2020    IR INTERVENTION VENOUS EMBOLIZATION 2/19/2020 [facility]    IR INTERVENTION VENOUS SCLEROSIS  4/22/2019    IR INTERVENTION VENOUS SCLEROSIS 4/22/2019 [facility]    MR CHEST ANGIO W AND WO IV CONTRAST  2/5/2020    MR CHEST ANGIO W AND WO IV CONTRAST 2/5/2020 [facility]    MR HEAD ANGIO WO IV CONTRAST  12/10/2020    MR HEAD ANGIO WO IV CONTRAST 12/10/2020 [facility]    OTHER SURGICAL HISTORY  04/06/2015    Hip Surgery Left    OTHER SURGICAL HISTORY  01/13/2022    Hysterectomy    OTHER SURGICAL HISTORY  09/24/2018    Abdominal Surgery    OTHER SURGICAL HISTORY  09/24/2018     Angioplasty    OTHER SURGICAL HISTORY  09/24/2018    Central Intravenous Catheter     Family History   Problem Relation Name Age of Onset    Colon cancer Mother      Other (gene mutation) Mother      Other (AVM) Father      Other (cerebrovascular accident  [Other]) Father      Other (Cardiac disorder [Other]) Father      Breast cancer Mother's Sister      Other (gene mutation) Mother's Sister      Breast cancer Father's Sister      Leukemia Father's Brother      Skin cancer Other Family History     Breast cancer Maternal Cousin      Other (gene mutation) Maternal Cousin       Social History     Tobacco Use    Smoking status: Unknown    Smokeless tobacco: Not on file   Vaping Use    Vaping Use: Never used   Substance Use Topics    Alcohol use: Yes     Alcohol/week: 1.0 standard drink of alcohol     Types: 1 Standard drinks or equivalent per week     Comment: socially    Drug use: Never       Physical Exam   ED Triage Vitals   Temp Heart Rate Resp BP   -- 10/16/23 0057 10/16/23 0121 10/16/23 0057    (!) 124 12 140/77      SpO2 Temp src Heart Rate Source Patient Position   10/16/23 0057 -- 10/16/23 0057 10/16/23 0057   100 %  Monitor Lying      BP Location FiO2 (%)     10/16/23 0057 --     Right arm        Physical Exam  Vitals and nursing note reviewed.   Constitutional:       General: She is not in acute distress.     Appearance: She is well-developed. She is not toxic-appearing.   HENT:      Head: Normocephalic and atraumatic.   Eyes:      Extraocular Movements: Extraocular movements intact.      Pupils: Pupils are equal, round, and reactive to light.   Cardiovascular:      Rate and Rhythm: Regular rhythm. Tachycardia present.      Heart sounds: Normal heart sounds.   Pulmonary:      Effort: Pulmonary effort is normal.      Breath sounds: Normal breath sounds.   Abdominal:      Palpations: Abdomen is soft.      Tenderness: There is no abdominal tenderness.   Musculoskeletal:         General: Normal range of motion.       Cervical back: Normal range of motion and neck supple.   Skin:     General: Skin is warm and dry.      Capillary Refill: Capillary refill takes less than 2 seconds.   Neurological:      General: No focal deficit present.      Mental Status: She is alert.         ED Course & MDM   Diagnoses as of 10/16/23 0708   Hemoptysis       Medical Decision Making  28-year-old female with a past medical history of hereditary telangiectasias presenting to the ED for hemoptysis.  Patient did have several episodes of hemoptysis with bright red blood that was witnessed in the ED.  She was tachycardic but hemodynamically stable, saturating 99% on room air.  She was endorsing chest pain and was treated with morphine.    Labs show a decrease in hemoglobin from earlier today when she was evaluated at [facility].  CT angio of the chest does not show evidence of PE or interval changes concerning for active bleeding however given her decrease in hemoglobin in the setting of active hemoptysis, patient will be admitted for monitoring due to high risk of decompensation.    Amount and/or Complexity of Data Reviewed  External Data Reviewed: labs, radiology and notes.  Labs: ordered.  Radiology: ordered and independent interpretation performed.  ECG/medicine tests: ordered and independent interpretation performed.     Details: EKG #1: Sinus tachycardia with rate of 128.  QTc 449.  Right axis.  No acute ischemic pattern.    EKG #2: Normal sinus rhythm with rate of 94.  QTc 440.  Normal axis.  No acute ischemic finding.        Procedure  Procedures     Rayray Byrd DO  Resident  10/16/23 0708

## 2023-10-16 NOTE — H&P
History Of Present Illness  Pretty Devine is a 28 y.o. female presenting with complaints of abdominal pain and hemoptysis.  Of note, patient has a past medical history significant for Osler-Weber-Rendu syndrome with known AVMs and frequent episodes of both gastrointestinal bleeding as well as episodic hemoptysis associated with these bleeding AVMs.  She was seen at an outside facility earlier today for complaints of abdominal pain where blood work and a CT scan were obtained without any acute findings.  Patient was discharged without complications however in the interim, endorsed multiple episodes of hemoptysis prompting evaluation at this facility.  Per ED report, she had several episodes of witnessed bright red blood after coughing episodes since arrival.  Hemoglobin noted to be 9.8.  This does represent a significant interval decrease when compared to her last available reference point.  Baseline hemoglobin seems to be greater than 11.  MCV noted to be depressed, patient does have a history of iron deficiency.  Admitted for further evaluation.     Past Medical History  Past Medical History:   Diagnosis Date    Acute upper respiratory infection, unspecified     Acute upper respiratory infection    Disruption of external operation (surgical) wound, not elsewhere classified, initial encounter 09/04/2019    Dehiscence of incision, initial encounter    Effusion, left hip 05/18/2015    Swelling of left hip joint    Encounter for general adult medical examination without abnormal findings 08/19/2020    Encounter for Medicare annual wellness exam    Encounter for other preprocedural examination     Other specified pre-operative examination    Encounter for other preprocedural examination     Pre-op testing    Fever presenting with conditions classified elsewhere 09/10/2018    Fever in other diseases    Fracture of unspecified part of neck of unspecified femur, initial encounter for closed fracture (CMS/[facility]) 05/06/2015     Femoral neck fracture    Jaw pain 03/03/2021    Mandible pain    Other conditions influencing health status 10/29/2019    Inflammation    Other specified postprocedural states     S/P coronary angiogram    Other symptoms and signs involving the musculoskeletal system 01/10/2017    Weakness of both lower extremities    Pain in unspecified ankle and joints of unspecified foot 01/11/2022    Ankle pain    Pain in unspecified hip 04/09/2019    Hip pain    Pelvic and perineal pain 05/18/2015    Pelvic pain in female    Personal history of diseases of the blood and blood-forming organs and certain disorders involving the immune mechanism 04/02/2021    History of anemia    Personal history of diseases of the blood and blood-forming organs and certain disorders involving the immune mechanism 01/12/2022    History of anemia    Personal history of diseases of the skin and subcutaneous tissue 01/13/2022    History of folliculitis    Personal history of other diseases of the circulatory system 04/25/2019    History of paroxysmal supraventricular tachycardia    Personal history of other diseases of the digestive system 07/16/2020    History of hematemesis    Personal history of other diseases of the nervous system and sense organs 09/11/2019    History of ear pain    Personal history of other diseases of urinary system 11/12/2019    History of hematuria    Personal history of other drug therapy     History of drug therapy    Personal history of other drug therapy     History of drug therapy    Personal history of other endocrine, nutritional and metabolic disease 05/11/2016    History of iron deficiency    Personal history of other endocrine, nutritional and metabolic disease 08/21/2020    History of obesity    Personal history of other infectious and parasitic diseases     History of herpes labialis    Personal history of other mental and behavioral disorders 06/03/2021    History of depression    Personal history of other  specified conditions 08/21/2020    History of persistent cough    Personal history of other specified conditions 09/06/2017    History of epistaxis    Personal history of other specified conditions 05/24/2020    History of jaundice    Personal history of other specified conditions 07/13/2020    History of dysphagia    Personal history of other venous thrombosis and embolism 12/29/2022    History of deep venous thrombosis    Personal history of pulmonary embolism 03/29/2021    History of pulmonary embolism    Shortness of breath 09/10/2018    SOB (shortness of breath) on exertion    Trochanteric bursitis, unspecified hip 08/04/2015    Trochanteric bursitis       Surgical History  Past Surgical History:   Procedure Laterality Date    CT ABDOMEN PELVIS ANGIOGRAM W AND/OR WO IV CONTRAST  9/12/2019    CT ABDOMEN PELVIS ANGIOGRAM W AND/OR WO IV CONTRAST 9/12/2019 [facility]    CT ABDOMEN PELVIS ANGIOGRAM W AND/OR WO IV CONTRAST  10/22/2019    CT ABDOMEN PELVIS ANGIOGRAM W AND/OR WO IV CONTRAST 10/22/2019 [facility]    CT ABDOMEN PELVIS ANGIOGRAM W AND/OR WO IV CONTRAST  4/3/2022    CT ABDOMEN PELVIS ANGIOGRAM W AND/OR WO IV CONTRAST 4/3/2022 [facility]    CT HEAD ANGIO W AND WO IV CONTRAST  4/11/2019    CT HEAD ANGIO W AND WO IV CONTRAST 4/11/2019 [facility]GENCY [facility]GAC[facility]    CT HEAD ANGIO W AND WO IV CONTRAST  9/26/2020    CT HEAD ANGIO W AND WO IV CONTRAST 9/26/2020 [facility]    CT HEAD ANGIO W AND WO IV CONTRAST  9/14/2022    CT HEAD ANGIO W AND WO IV CONTRAST 9/14/2022 [facility]    CT NECK ANGIO W AND WO IV CONTRAST  9/14/2022    CT NECK ANGIO W AND WO IV CONTRAST 9/14/2022 [facility]    IR INTERVENTION VENOUS EMBOLIZATION  2/19/2020    IR INTERVENTION VENOUS EMBOLIZATION 2/19/2020 [facility]    IR INTERVENTION VENOUS SCLEROSIS  4/22/2019    IR INTERVENTION VENOUS SCLEROSIS 4/22/2019 [facility]    MR CHEST ANGIO W AND WO IV CONTRAST  2/5/2020    MR  CHEST ANGIO W AND WO IV CONTRAST 2/5/2020 [facility]    MR HEAD ANGIO WO IV CONTRAST  12/10/2020    MR HEAD ANGIO WO IV CONTRAST 12/10/2020 [facility]    OTHER SURGICAL HISTORY  04/06/2015    Hip Surgery Left    OTHER SURGICAL HISTORY  01/13/2022    Hysterectomy    OTHER SURGICAL HISTORY  09/24/2018    Abdominal Surgery    OTHER SURGICAL HISTORY  09/24/2018    Angioplasty    OTHER SURGICAL HISTORY  09/24/2018    Central Intravenous Catheter        Social History  She reports current alcohol use of about 1.0 standard drink of alcohol per week. She reports that she does not use drugs. No history on file for tobacco use.    Family History  Family History   Problem Relation Name Age of Onset    Colon cancer Mother      Other (gene mutation) Mother      Other (AVM) Father      Other (cerebrovascular accident  [Other]) Father      Other (Cardiac disorder [Other]) Father      Breast cancer Mother's Sister      Other (gene mutation) Mother's Sister      Breast cancer Father's Sister      Leukemia Father's Brother      Skin cancer Other Family History     Breast cancer Maternal Cousin      Other (gene mutation) Maternal Cousin          Allergies  Alteplase, Latex, Fentanyl, Midazolam, Ceftriaxone, and Sulfamethoxazole-trimethoprim    Review of Systems   Respiratory:  Positive for cough.    Gastrointestinal:  Positive for abdominal pain and vomiting.        Physical Exam  Vitals reviewed.   Constitutional:       Appearance: Normal appearance.   HENT:      Head: Normocephalic and atraumatic.      Nose: Nose normal.      Mouth/Throat:      Mouth: Mucous membranes are moist.   Eyes:      Extraocular Movements: Extraocular movements intact.      Conjunctiva/sclera: Conjunctivae normal.      Pupils: Pupils are equal, round, and reactive to light.   Cardiovascular:      Rate and Rhythm: Normal rate and regular rhythm.      Pulses: Normal pulses.      Heart sounds: Normal heart sounds.   Pulmonary:      Effort:  "Pulmonary effort is normal.      Breath sounds: Normal breath sounds.   Abdominal:      General: Bowel sounds are normal.      Palpations: Abdomen is soft.   Musculoskeletal:         General: Normal range of motion.      Cervical back: Normal range of motion and neck supple.   Skin:     General: Skin is warm and dry.   Neurological:      General: No focal deficit present.      Mental Status: She is alert. Mental status is at baseline.   Psychiatric:         Mood and Affect: Mood normal.         Behavior: Behavior normal.          Last Recorded Vitals  Blood pressure 110/72, pulse 104, resp. rate 16, height 1.626 m (5' 4\"), weight 56.7 kg (125 lb), SpO2 99 %.    Relevant Results  Scheduled medications    Continuous medications    PRN medications  CT angio chest for pulmonary embolism    Result Date: 10/16/2023  Interpreted By:  David Quan, STUDY: CT ANGIO CHEST FOR PULMONARY EMBOLISM;  10/16/2023 2:05 am   INDICATION: Signs/Symptoms:hx hereditary telangiectasias now with hemoptysis, tachycardic, hx of pe.   COMPARISON: 6/27/2023   ACCESSION NUMBER(S): [id number]   ORDERING CLINICIAN: SYDNIE SALAS   TECHNIQUE: Contiguous axial images of the chest were obtained after the intravenous administration of  contrast. Coronal and sagittal reformatted images were obtained from the axial images. MIPS of the chest were also performed and reviewed and from the findings of the examination.   FINDINGS: A right chest port catheter is noted.   No axillary, mediastinal, or hilar lymphadenopathy.   The heart is normal in size. No significant pericardial effusion. No evidence of acute central, main, lobar or proximal segmental pulmonary embolism.   Evaluation of the lungs is limited secondary to respiratory motion. The previously described multiple bilateral pulmonary nodules are improved in comparison to prior examination with resolution of multiple the previously seen pulmonary nodules. There are however persistent small bilateral " pulmonary nodules measuring 4 mm and less in size. No airspace consolidation or pleural effusion. No pneumothorax   Limited evaluation of the upper abdomen. Linear metallic densities in the gastric fundus may relate to surgical clips.   No evidence of acute fracture of the thoracic spine.       No evidence of acute pulmonary embolism.   Interval improvement in previously described pulmonary nodules with resolution of multiple of the groundglass nodular opacities with several small residual 4 mm and smaller pulmonary nodules on the current examination.   No airspace consolidation or pleural effusion.   Linear metallic densities in the gastric fundus may relate to surgical clips, however clinical correlation is recommended.   MACRO: None   Signed by: David Quan 10/16/2023 2:18 AM Dictation workstation:   [id number]    US DOPPLER COMPLETE    Result Date: 10/15/2023  * * *Final Report* * * DATE OF EXAM: Oct 15 2023  6:20PM   U   1033  -  US DOPPLER COMPLETE  / ACCESSION #  [id number] PROCEDURE REASON: Ovarian torsion      * * * * Physician Interpretation * * * *  EXAMINATION:   TRANSVAGINAL AND LIMITED TRANSABDOMINAL FEMALE PELVIC ULTRASOUND CLINICAL HISTORY:  Pelvic pain TECHNIQUE: Sonography of the pelvis was performed by transvaginal and transabdominal (limited) techniques.  Images were obtained and stored in a permanent archive. MQ:  [facility]_2022 COMPARISON: None RESULT: Uterus: Surgically absent. Right Ovary: 3.1 x 2.4 x 2.6 cm  - Normal sonographic appearance with physiologic follicles.  Doppler imaging showed normal arterial and venous flow throughout the ovary. Left Ovary: 1.7 x 1.2 x 2.6 cm  - Normal sonographic appearance with physiologic follicles.  Doppler imaging showed normal arterial and venous flow throughout the ovary. Free Fluid: No abnormal free fluid is present.    IMPRESSION: No acute pathology Transcriptionist: PSCKARO   Transcribe Date/Time: Oct 15 2023  6:51P Dictated by : SHREYAS DOMINGUEZ MD This  examination was interpreted and the report reviewed and electronically signed by: SHREYAS DOMINGUEZ MD on Oct 15 2023  6:53PM  EST    US FEMALE PELVIS TRANSVAG    Result Date: 10/15/2023  * * *Final Report* * * DATE OF EXAM: Oct 15 2023  6:20PM   U   1060  -  US FEMALE PELVIS TRANSVAG  / ACCESSION #  [id number] PROCEDURE REASON: Ovarian torsion      * * * * Physician Interpretation * * * *  EXAMINATION:   TRANSVAGINAL AND LIMITED TRANSABDOMINAL FEMALE PELVIC ULTRASOUND CLINICAL HISTORY:  Pelvic pain TECHNIQUE: Sonography of the pelvis was performed by transvaginal and transabdominal (limited) techniques.  Images were obtained and stored in a permanent archive. MQ:  UFP_2022 COMPARISON: None RESULT: Uterus: Surgically absent. Right Ovary: 3.1 x 2.4 x 2.6 cm  - Normal sonographic appearance with physiologic follicles.  Doppler imaging showed normal arterial and venous flow throughout the ovary.  Left Ovary: 1.7 x 1.2 x 2.6 cm  - Normal sonographic appearance with physiologic follicles.  Doppler imaging showed normal arterial and venous flow throughout the ovary. Free Fluid: No abnormal free fluid is present.    IMPRESSION: No acute pathology Transcriptionist: LILIBETH   Transcribe Date/Time: Oct 15 2023  6:51P Dictated by : SHREYAS DOMINGUEZ MD This examination was interpreted and the report reviewed and electronically signed by: SHREYAS DOMINGUEZ MD on Oct 15 2023  6:53PM  EST    US FEMALE PELVIS TRANSABD LTD    Result Date: 10/15/2023  * * *Final Report* * * DATE OF EXAM: Oct 15 2023  6:20PM   U   1059  -  US FEMALE PELVIS TRANSABD  LTD  / ACCESSION #  [id number] PROCEDURE REASON: Ovarian torsion      * * * * Physician Interpretation * * * *  EXAMINATION:   TRANSVAGINAL AND LIMITED TRANSABDOMINAL FEMALE PELVIC ULTRASOUND CLINICAL HISTORY:  Pelvic pain TECHNIQUE: Sonography of the pelvis was performed by transvaginal and transabdominal (limited) techniques.  Images were obtained and stored in a permanent archive. MQ:  [facility]_2022  COMPARISON: None RESULT: Uterus: Surgically absent. Right Ovary: 3.1 x 2.4 x 2.6 cm  - Normal sonographic appearance with physiologic follicles.  Doppler imaging showed normal arterial and venous flow throughout the ovary. Left Ovary: 1.7 x 1.2 x 2.6 cm  - Normal sonographic appearance with physiologic follicles.  Doppler imaging showed normal arterial and venous flow throughout the ovary. Free Fluid: No abnormal free fluid is present.    IMPRESSION: No acute pathology Transcriptionist: PSCB   Transcribe Date/Time: Oct 15 2023  6:51P Dictated by : SHREYAS DOMINGUEZ MD This examination was interpreted and the report reviewed and electronically signed by: SHREYAS DOMINGUEZ MD on Oct 15 2023  6:53PM  EST    CT ABD/PEL W IVCON    Result Date: 10/15/2023  * * *Final Report* * * DATE OF EXAM: Oct 15 2023  6:39PM   [facility]   0530  -  CT ABD/PEL W IVCON  / ACCESSION #  [id number] PROCEDURE REASON: RLQ abdominal pain (Age >= 14y)      * * * * Physician Interpretation * * * *  EXAMINATION:  CT ABDOMEN AND PELVIS WITH IV CONTRAST CLINICAL HISTORY: Right lower quadrant pain TECHNIQUE: CT of the abdomen and pelvis was performed using standard technique, scanning from just above the dome of the diaphragm to the symphysis pubis. MQ:  CTAP_3   Contrast: IV:  100 ml of Omnipaque 350 Oral: None CT Radiation dose: Integrated Dose-length product (DLP) for this visit =   151 mGy*cm. CT Dose Reduction Employed: Automated exposure control(AEC) and iterative recon COMPARISON: 10/02/2023.  RESULT: Liver: Focal fatty change is seen within the liver, adjacent to the falciform ligament.  There is no obvious focal discrete hepatic mass. Biliary: The patient is status post cholecystectomy.  Mild biliary dilation likely relates to the cholecystectomy. Spleen: No mass.  Stable mild splenomegaly. Pancreas: There is no obvious focal discrete pancreatic mass or pancreatic ductal dilation. Adrenals: No mass. Kidneys: There is no hydronephrosis or perinephric  fluid collection. GI tract: Postsurgical changes are again seen involving the gastric fundus.  There are no dilated loops of bowel to suggest obstruction.   Contents are seen within the stomach.  Moderate stool burden.  The appendix is seen within the right lower quadrant and is within normal limits.  There is sigmoid colon diverticulosis, without CT evidence for diverticulitis. Lymph nodes: No abdominal lymphadenopathy. Mesentery/Peritoneum: No abdominal ascites. Retroperitoneum: No mass. Vasculature: There is no abdominal aortic aneurysm. Pelvis: Mild pelvic ascites is likely physiologic.  Metallic clip is incidentally seen dependently within the right pelvis.  Metallic density is seen within the right anterior pelvic wall.  Prominent, less than 1 cm pelvic lymph nodes are likely reactive. Bones/Soft Tissues: There is a sclerotic lesion is again seen within the left iliac bone, stable, likely benign.  No destructive bony lesion. Lower thorax: Stable subcentimeter pulmonary nodules.  For example, there is a stable, approximately 3 mm nodule seen within the right lower lobe (series 301, image #4).  There is a stable, approximately 4 mm nodule seen within the right lower lobe (series 301, image #4).  Other subcentimeter pulmonary nodules are also stable.  There is no new pulmonary nodule.    IMPRESSION: No acute abdominal or pelvic process is identified. Moderate stool burden.  Normal appendix.  Sigmoid colon diverticulosis, without CT evidence for diverticulitis. Stable mild splenomegaly. Incidentally noted are stable subcentimeter pulmonary nodules. : LILIBETH   Transcribe Date/Time: Oct 15 2023  6:44P Dictated by : ADAM DEE MD This examination was interpreted and the report reviewed and electronically signed by: ADAM DEE MD on Oct 15 2023  6:52PM  EST    ENTEROSCOPY    Result Date: 10/2/2023  [facility] Gastrointestinal Endoscopy Patient Name: Pretty Devine Procedure Date: 10/2/2023  1:26 PM MRN: [id number] Account Number: [id number] YOB: 1995 Admit Type: Inpatient Age: 28 Room: AV PROCEDURE A Gender: Female Note Status: Finalized Attending MD: Kush Randolph MD Procedure:             Small bowel enteroscopy Indications:           Hematemesis Providers:             Kush Randolph MD Patient Profile:       This is a 28 year old female. Refer to note in                        patient chart for documentation of history and                        physical. Referring Physician:   Mariluz (mattie) Lalitha (Referring MD) Medicines:             Propofol per Anesthesia Complications:         No immediate complications. Requesting Provider:   Procedure:             Pre-Anesthesia Assessment:                        - Prior to the procedure, a History and Physical                        was performed, and patient medications and                        allergies were reviewed. The patient is competent.                        The risks and benefits of the procedure and the                        sedation options and risks were discussed with the                        patient. All questions were answered and informed                        consent was obtained. Patient identification and                        proposed procedure were verified by the physician                        and the nurse in the pre-procedure area in the                        procedure room. Mental Status Examination: alert                        and oriented. Airway Examination: normal                        oropharyngeal airway and neck mobility. CV                        Examination: normal. Prophylactic Antibiotics: The                        patient does not require prophylactic antibiotics.                        Prior Anticoagulants: The patient has taken no                        anticoagulant or antiplatelet agents. ASA Grade                        Assessment: III - A patient with severe systemic                         disease. After reviewing the risks and benefits,                        the patient was deemed in satisfactory condition to                        undergo the procedure. The anesthesia plan was to                        use monitored anesthesia care (MAC). Immediately                        prior to administration of medications, the patient                        was re-assessed for adequacy to receive sedatives.                        The heart rate, respiratory rate, oxygen                        saturations, blood pressure, adequacy of pulmonary                        ventilation, and response to care were monitored                        throughout the procedure. The physical status of                        the patient was re-assessed after the procedure.                        After obtaining informed consent, the endoscope was                        passed under direct vision. Throughout the                        procedure, the patient's blood pressure, pulse, and                        oxygen saturations were monitored continuously. The                        3389 EGD was introduced through the mouth and                        advanced to the second part of duodenum. The                        Colonoscope was introduced through the mouth and                        advanced to the proximal jejunum. The small bowel                        enteroscopy was accomplished without difficulty.                        The patient tolerated the procedure well. Moderate Sedation:      MAC anesthesia was administered by the anesthesia team. Total Procedure Duration: 0 hours 6 minutes 7 seconds Findings:      The examined esophagus was normal.      The Z-line was regular and was found 39 cm from the incisors.      The entire examined stomach was normal aside from 3 previously placed      clips in the fundus.      The examined duodenum was normal.      There was no evidence of significant pathology in 40 cm of  examined      jejunum. Impression:            - Normal esophagus.                        - Z-line regular, 39 cm from the incisors.                        - Normal stomach.                        - Normal examined duodenum.                        - The examined portion of the jejunum was normal.                        - No specimens collected. Recommendation:        - [facility].                        - Patient has a contact number available for                        emergencies. The signs and symptoms of potential                        delayed complications were discussed with the                        patient. Return to normal activities tomorrow.                        Written discharge instructions were provided to the                        patient.                        - Resume previous diet. Procedure Code(s):     --- Professional ---                        12854, Small intestinal endoscopy, enteroscopy                        beyond second portion of duodenum, not including                        ileum; diagnostic, including collection of                        specimen(s) by brushing or washing, when performed                        (separate procedure) Diagnosis Code(s):     --- Professional ---                        K92.0, Hematemesis CPT copyright 2021 [employer]. All rights reserved. The codes documented in this report are preliminary and upon  review may be revised to meet current compliance requirements. Attending Participation:      I personally performed the entire procedure. Scope In: 1:33:44 PM Scope Out: 1:39:51 PM MD Kush Simon MD 10/2/2023 1:43:18 PM This report has been signed electronically by Kush Randolph MD Number of Addenda: 0 Note Initiated On: 10/2/2023 1:26 PM Estimated Blood Loss:      Estimated blood loss was minimal.    CT ABD/PEL W IVCON    Result Date: 10/2/2023  * * *Final  Report* * * DATE OF EXAM: Oct  2 2023 12:35AM   [facility]   0530  -  CT ABD/PEL W IVCON  / ACCESSION #  [id number] PROCEDURE REASON: Abdominal pain, acute, nonlocalized      * * * * Physician Interpretation * * * *  EXAMINATION:  CT ABDOMEN AND PELVIS WITH IV CONTRAST CLINICAL HISTORY: Right lower quadrant pain TECHNIQUE: CT of the abdomen and pelvis was performed using standard technique, scanning from just above the dome of the diaphragm to the symphysis pubis. MQ:  CTAP_3 Contrast: IV:  100 ml of Omnipaque 350 : ml of CT Radiation dose: Integrated Dose-length product (DLP) for this visit =   277 mGy*cm. CT Dose Reduction Employed: Automated exposure control(AEC) and iterative recon COMPARISON: None. RESULT: Liver: No mass. Biliary: No bile duct dilation.  Gallbladder is absent. Spleen: No mass. Similar craniocaudal length of 13.7 cm.  Redemonstration of low-attenuation lesion in the lateral spleen, not significantly changed from prior.  Pancreas: No mass or duct dilation. Adrenals: No mass. Kidneys: No mass, calculus or hydronephrosis. GI tract: No dilation or wall thickening.  Vascular clips are seen in the gastric fundus. Lymph nodes: No abdominal or pelvic lymphadenopathy. Mesentery/Peritoneum: No ascites or mass. Retroperitoneum: No mass. Vasculature: Unremarkable Pelvis: No mass, ascites or fluid collection. A new surgical clip is noted adjacent to the superior aspect of the urinary bladder. Bones/Soft Tissues: Vascular coils in the anterior right pelvic soft tissues. Lower thorax: Mild reticulonodular appearance similar to prior study.  (topogram) images: No additional findings.    IMPRESSION: Stable mild splenomegaly.  Stable appearance of nonspecific hypodensities. New clips in the gastric fundus consistent with AVM clips. New surgical clip adjacent to the urinary bladder of uncertain etiology : LILIBETH   Transcribe Date/Time: Oct  2 2023  1:01A Dictated by : SUE TAYLOR MD This examination  was interpreted and the report reviewed and electronically signed by: SUE TAYLOR MD on Oct  2 2023  1:10AM  EST    ENTEROSCOPY    Result Date: 9/19/2023  [facility] Patient Name: Pretty Devine Procedure Date: 9/19/2023 9:56 AM MRN: [id number] Account Number: [id number] YOB: 1995 Admit Type: Inpatient Age: 28 Room:  PROCEDURE A Gender: Female Note Status: Finalized Attending MD: Carlos Carr MD Procedure:             Small bowel enteroscopy Indications:           Iron deficiency anemia due to suspected upper                        gastrointestinal bleeding, Hereditary hemorrhagic                        telangiectasia Providers:             Carlos Carr MD Patient Profile:       This is a 28 year old female. Refer to note in                        patient chart for documentation of history and                        physical.  Referring Physician:   Mariluz Doty (cnp) (Referring MD) Medicines:             Monitored Anesthesia Care Complications:         No immediate complications. Requesting Provider:   Procedure:             Pre-Anesthesia Assessment:                        - Prior to the procedure, a History and Physical                        was performed, and patient medications and                        allergies were reviewed. The risks and benefits of                        the procedure and the sedation options and risks                        were discussed with the patient. All questions were                        answered and informed consent was obtained. Patient                        identification and proposed procedure were verified                        by the physician, the nurse and the anesthetist in                        the procedure room at 09:56 AM. Mental Status                        Examination: alert and oriented. Airway                        Examination: normal oropharyngeal airway and neck                        mobility.  Respiratory Examination: clear to                        auscultation. CV Examination: normal. Prophylactic                        Antibiotics: The patient does not require                        prophylactic antibiotics. Prior Anticoagulants: The                        patient has taken no anticoagulant or antiplatelet                        agents. ASA Grade Assessment: III - A patient with                        severe systemic disease. After reviewing the risks                        and benefits, the patient was deemed in                        satisfactory condition to undergo the procedure.                        The anesthesia plan was to use monitored anesthesia                        care (MAC). Immediately prior to administration of                        medications, the patient was re-assessed for                        adequacy to receive sedatives. The physical status                        of the patient was re-assessed after the procedure.                        After obtaining informed consent, the endoscope was                        passed under direct vision. Throughout the                        procedure, the patient's blood pressure, pulse, and                        oxygen saturations were monitored continuously. The                        Colonoscope was introduced through the mouth and                        advanced to the proximal jejunum. The small bowel                        enteroscopy was accomplished without difficulty.                        The patient tolerated the procedure well. Moderate Sedation:      MAC anesthesia was administered by the anesthesia team. Total Procedure Duration: 0 hours 15 minutes 9 seconds Findings:      The examined esophagus was normal.      Two small angioectasias with bleeding were found in the gastric      fundus. Fulguration to ablate the lesion to prevent bleeding by argon      plasma at 1.2 liters/minute and 30 kemp was successful. For       hemostasis, three hemostatic clips were successfully placed. Bleeding      had stopped at the end of the procedure.      The exam of the stomach was otherwise normal.      A single angioectasia with no bleeding was found in the duodenal      bulb. Fulguration to ablate the lesion to prevent bleeding by argon      plasma at 1.1 liters/minute and 20 kemp was successful.      The exam of the duodenum was otherwise normal.      There was no evidence of significant pathology at 100 cm (from the      incisors). Impression:            - Normal esophagus.                        - Two bleeding angioectasias in the stomach.                        Treated with argon plasma coagulation (APC). Clips                        were placed.                        - A single non-bleeding angioectasia in the                        duodenum. Treated with argon plasma coagulation                        (APC).                        - The examined portion of the jejunum was normal.                        - No specimens collected. Recommendation:        - Patient has a contact number available for                        emergencies. The signs and symptoms of potential                        delayed complications were discussed with the                        patient. Return to normal activities tomorrow.                        Written discharge instructions were provided to the                        patient.                        - Use Protonix (pantoprazole) 40 mg PO BID for 1                        month.                        - Repeat the small bowel enteroscopy PRN for                        retreatment. Procedure Code(s):     --- Professional ---                        12965, 22, Small intestinal endoscopy, enteroscopy                        beyond second portion of duodenum, not including                        ileum; with control of bleeding (eg, injection,                        bipolar cautery, unipolar cautery, laser, heater                         probe, stapler, plasma coagulator) Diagnosis Code(s):     --- Professional ---                        K31.811, Angiodysplasia of stomach and duodenum                        with bleeding                        D50.9, Iron deficiency anemia, unspecified                        I78.0, Hereditary hemorrhagic telangiectasia CPT copyright 2021 [employer]. All rights reserved. The codes documented in this report are preliminary and upon  review may be revised to meet current compliance requirements. Attending Participation:      I personally performed the entire procedure. Scope In: 10:19:41 AM Scope Out: 10:34:50 AM MD Carlos Cruz MD 9/19/2023 10:38:56 AM This report has been signed electronically by Carlos Carr MD Number of Addenda: 0 Note Initiated On: 9/19/2023 9:56 AM Estimated Blood Loss:      Estimated blood loss was minimal.    CT ABD/PEL W IVCON    Result Date: 9/18/2023  * * *Final Report* * * DATE OF EXAM: Sep 18 2023  6:41PM   [facility]   0530  -  CT ABD/PEL W IVCON  / ACCESSION #  [id number] PROCEDURE REASON: GI bleed      * * * * Physician Interpretation * * * *  EXAMINATION:  CT ABDOMEN AND PELVIS WITH IV CONTRAST CLINICAL HISTORY: Gastrointestinal bleeding, nausea vomiting TECHNIQUE: CT of the abdomen and pelvis was performed using standard technique, scanning from just above the dome of the diaphragm to the symphysis pubis. MQ:  CTAP_3 Contrast: IV:  90 ml of Omnipaque 350 : ml of CT Radiation dose: Integrated Dose-length product (DLP) for this visit =   295 mGy*cm. CT Dose Reduction Employed: Automated exposure control(AEC) and iterative recon COMPARISON: 9/26/2019, and others  RESULT: Liver: Mild hepatomegaly. Mild periportal edema. Biliary: Mild biliary ductal dilatation likely the basis of prior cholecystectomy ectasia. Spleen: Curvilinear low-attenuation lesion lateral spleen appears new relative the prior study. There is  mild-to-moderate splenomegaly. No splenomegaly. Pancreas: No mass or duct dilation. Adrenals: No mass. Kidneys: No mass, calculus or hydronephrosis. GI tract: No dilation or wall thickening. Lymph nodes: No abdominal or pelvic lymphadenopathy. Mesentery/Peritoneum: No ascites or mass. Retroperitoneum: No mass. Vasculature: Unremarkable Pelvis: Minimal free pelvic fluid. Bones/Soft Tissues: No significant finding. Lower thorax: Unremarkable.  (topogram) images: Unremarkable.    IMPRESSION: Splenomegaly with indeterminate curvilinear low-attenuation lesion laterally. Findings may reflect atypical changes of splenic infarction. Splenic injury A similar appearance. No perisplenic fluid is present, however. Hepatomegaly with periportal edema. This is nonspecific but can be seen with hepatitis. : LILIBETH   Transcribe Date/Time: Sep 18 2023  6:46P Dictated by : MINOO DE LA TORRE MD This examination was interpreted and the report reviewed and electronically signed by: MINOO DE LA TORRE MD on Sep 18 2023  6:49PM  EST   Results for orders placed or performed during the hospital encounter of 10/16/23 (from the past 24 hour(s))   CBC and Auto Differential   Result Value Ref Range    WBC 3.0 (L) 4.4 - 11.3 x10*3/uL    nRBC 0.0 0.0 - 0.0 /100 WBCs    RBC 4.23 4.00 - 5.20 x10*6/uL    Hemoglobin 9.8 (L) 12.0 - 16.0 g/dL    Hematocrit 32.1 (L) 36.0 - 46.0 %    MCV 76 (L) 80 - 100 fL    MCH 23.2 (L) 26.0 - 34.0 pg    MCHC 30.5 (L) 32.0 - 36.0 g/dL    RDW 17.1 (H) 11.5 - 14.5 %    Platelets 283 150 - 450 x10*3/uL    MPV 9.9 7.5 - 11.5 fL    Neutrophils % 61.2 40.0 - 80.0 %    Immature Granulocytes %, Automated 0.3 0.0 - 0.9 %    Lymphocytes % 26.0 13.0 - 44.0 %    Monocytes % 11.5 2.0 - 10.0 %    Eosinophils % 0.7 0.0 - 6.0 %    Basophils % 0.3 0.0 - 2.0 %    Neutrophils Absolute 1.86 1.20 - 7.70 x10*3/uL    Immature Granulocytes Absolute, Automated 0.01 0.00 - 0.70 x10*3/uL    Lymphocytes Absolute 0.79 (L) 1.20 - 4.80  x10*3/uL    Monocytes Absolute 0.35 0.10 - 1.00 x10*3/uL    Eosinophils Absolute 0.02 0.00 - 0.70 x10*3/uL    Basophils Absolute 0.01 0.00 - 0.10 x10*3/uL   Comprehensive Metabolic Panel   Result Value Ref Range    Glucose 102 (H) 74 - 99 mg/dL    Sodium 135 (L) 136 - 145 mmol/L    Potassium 3.3 (L) 3.5 - 5.3 mmol/L    Chloride 102 98 - 107 mmol/L    Bicarbonate 23 21 - 32 mmol/L    Anion Gap 13 10 - 20 mmol/L    Urea Nitrogen 14 6 - 23 mg/dL    Creatinine 0.69 0.50 - 1.05 mg/dL    eGFR >90 >60 mL/min/1.73m*2    Calcium 9.2 8.6 - 10.3 mg/dL    Albumin 4.5 3.4 - 5.0 g/dL    Alkaline Phosphatase 76 33 - 110 U/L    Total Protein 7.6 6.4 - 8.2 g/dL    AST 27 9 - 39 U/L    Bilirubin, Total 0.3 0.0 - 1.2 mg/dL    ALT 26 7 - 45 U/L   Magnesium   Result Value Ref Range    Magnesium 1.77 1.60 - 2.40 mg/dL   hCG, quantitative, pregnancy   Result Value Ref Range    HCG, Beta-Quantitative <2 <5 mIU/mL   Troponin I, High Sensitivity, Initial   Result Value Ref Range    Troponin I, High Sensitivity 6 0 - 13 ng/L   Troponin, High Sensitivity, 1 Hour   Result Value Ref Range    Troponin I, High Sensitivity 6 0 - 13 ng/L   Hemoglobin and hematocrit, blood   Result Value Ref Range    Hemoglobin 9.2 (L) 12.0 - 16.0 g/dL    Hematocrit 29.9 (L) 36.0 - 46.0 %          Assessment/Plan   Principal Problem:    Hemoptysis  Active Problems:    Osler hemorrhagic telangiectasia syndrome (CMS/HCC)    Other specified anemias    AVM (arteriovenous malformation)      Patient presents to this facility with concern for persistent abdominal discomfort as well as hemoptysis which occurs within the context of 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  Patient's recent medical history reviewed.  Has underwent endoscopy, abdominal ultrasound, transvaginal ultrasound, and CT of abdomen/pelvis all within the past 30 days.  Stable pathology noted.  Most recent CT scan of the chest again confirms the presence of stable  pathology.    Hemoglobin noted to be depressed when compared to previous values.  13.1 on 7/11/2023.  9.8 at time of admission.  Further decreasing to 9.2 upon recheck 4 hours later.    Type and screen to be obtained at this time    Pulmonary service has been consulted for formal evaluation.  Patient is not in any respiratory distress and is maintaining appropriate saturation on current oxygen level.  No indication for emergent intervention.  Airway remains patent.  Holding any pharmacologic anticoagulation secondary to acute bleed.    Diet order initially placed.  However given the further drop in hemoglobin, patient will be made n.p.o. pending evaluation by pulmonary service    Noted history of iron deficiency.  MCV consistent with microcytosis.  We will obtain iron studies with mid morning blood draw.     Pain control with IV morphine    I spent >75 minutes in the professional and overall care of this patient.      John Baig, DO

## 2023-10-16 NOTE — DISCHARGE SUMMARY
Discharge Diagnosis  Hemoptysis    Issues Requiring Follow-Up  Create a care plan for frequent ED visits    Discharge Meds     Your medication list        CONTINUE taking these medications        Instructions Last Dose Given Next Dose Due   albuterol 2.5 mg /3 mL (0.083 %) nebulizer solution           Botox 100 unit injection  Generic drug: onabotulinumtoxinA  Notes to patient: Take as directed       PHYSICIAN TO INJECT 155 UNITS UNDER THE SKIN PER MIGRAINE PROTOCOL EVERY 3 MONTHS **FOR OFFICE USE ONLY**       Botox 100 unit injection  Generic drug: onabotulinumtoxinA  Notes to patient: Take as directed            cholecalciferol 50 MCG (2000 UT) tablet  Commonly known as: Vitamin D-3           citalopram 10 mg tablet  Commonly known as: CeleXA      Take 1 tablet (10 mg) by mouth once daily.       clindamycin 1 % lotion  Commonly known as: Cleocin T           desoximetasone 0.25 % cream  Commonly known as: Topicort      Apply 1 Application topically in the morning and 1 Application before bedtime.       EPINEPHrine 0.3 mg/0.3 mL injection syringe  Commonly known as: Epipen      1 unit(s) injectable once a day, As Needed       FeroSuL 325 (65 Fe) MG tablet  Generic drug: ferrous sulfate           hydrOXYzine HCL 25 mg tablet  Commonly known as: Atarax           ivabradine 5 mg tablet  Commonly known as: Corlanor           lidocaine 5 % patch  Commonly known as: Lidoderm      Place 2 patches over 12 hours on the skin once daily.       magnesium oxide 400 mg (241.3 mg magnesium) tablet  Commonly known as: Mag-Ox      Take 1 tablet (400 mg) by mouth once daily.       metoprolol succinate XL 25 mg 24 hr tablet  Commonly known as: Toprol-XL      Take 1 tablet (25 mg) by mouth 2 times a day.       metoprolol tartrate 25 mg tablet  Commonly known as: Lopressor      TAKE 1 TABLET BY MOUTH AS NEEDED FOR IF HEART RATE GOES ABOVE 120       midodrine 5 mg tablet  Commonly known as: Proamatine      Take 1 tablet (5 mg) by mouth 3  times a day.       naloxone 4 mg/0.1 mL nasal spray  Commonly known as: Narcan      Administer 1 spray (4 mg) into affected nostril(s) if needed for opioid reversal. May repeat every 2-3 minutes if needed, alternating nostrils, until medical assistance becomes available.       ondansetron ODT 8 mg disintegrating tablet  Commonly known as: Zofran-ODT      Take 1 tablet (8 mg) by mouth every 8 hours if needed for nausea.       oxyCODONE-acetaminophen 5-325 mg tablet  Commonly known as: Percocet      Take 1 tablet by mouth every 8 hours if needed for severe pain (7 - 10) for up to 15 days.       pregabalin 150 mg capsule  Commonly known as: Lyrica      Take 1 capsule (150 mg) by mouth 3 times a day.       risperiDONE 4 mg tablet  Commonly known as: RisperDAL      Take 1 tablet (4 mg) by mouth once daily.       tranexamic acid 650 mg tablet tablet  Commonly known as: Lysteda           triamcinolone 0.1 % cream  Commonly known as: Kenalog      Apply topically 2 times a day. Apply to affected area 1-2 times daily as needed.       Ubrelvy 100 mg tablet tablet  Generic drug: ubrogepant                    Test Results Pending At Discharge  Pending Labs       No current pending labs.            Hospital Course   History Of Present Illness  Pretty Devine is a 28 y.o. female presenting with complaints of abdominal pain and hemoptysis.  Of note, patient has a past medical history significant for Osler-Weber-Rendu syndrome with known AVMs and frequent episodes of both gastrointestinal bleeding as well as episodic hemoptysis associated with these bleeding AVMs.  She was seen at an outside facility earlier today for complaints of abdominal pain where blood work and a CT scan were obtained without any acute findings.  Patient was discharged without complications however in the interim, endorsed multiple episodes of hemoptysis prompting evaluation at this facility.  Per ED report, she had several episodes of witnessed bright red  "blood after coughing episodes since arrival.  Hemoglobin noted to be 9.8.  This does represent a significant interval decrease when compared to her last available reference point.  Baseline hemoglobin seems to be greater than 11.  MCV noted to be depressed, patient does have a history of iron deficiency.  Admitted for further evaluation.      Hospital course: Patient presented to [facility] for evaluation of hemoptysis.  This is a longstanding issue for this patient who has HHT and patient follows with [facility] physicians who have done multiple procedures on her by bronchoscopy including coiling and \"beads.\"  Patient unable to express why she came to [facility] for her care.  Her hemoglobin while here was 9.8 and down trended to 9.2.  There is no obvious evidence of hemoptysis when she reached the floor.  Initially patient was asking for IV morphine for her chest pain.  Discussed with patient that we will first trial oxycodone but patient adamant and Is requesting IV morphine.  When it was apparent to her that she was not going to receive this patient requested to leave AGAINST MEDICAL ADVICE.  I stated to patient that she was actually medically stable for discharge and that she can follow-up with her [facility] physicians after discharge.  Patient agreeable to this and was discharged with no medication changes.  In the future if patient presents to [facility] for hemoptysis unless she is absolutely unstable hemodynamically, hematologically, or biochemically, patient should be discharged with follow-up to her [facility] treatment team.    Pertinent Physical Exam At Time of Discharge  Physical Exam  Constitutional:       General: She is not in acute distress.     Appearance: Normal appearance.   HENT:      Head: Normocephalic and atraumatic.      Mouth/Throat:      Mouth: Mucous membranes are moist.   Eyes:      Extraocular Movements: Extraocular movements intact.      " Conjunctiva/sclera: Conjunctivae normal.      Pupils: Pupils are equal, round, and reactive to light.   Cardiovascular:      Rate and Rhythm: Normal rate and regular rhythm.      Heart sounds: Normal heart sounds.   Pulmonary:      Effort: Pulmonary effort is normal.      Breath sounds: Normal breath sounds. No wheezing, rhonchi or rales.   Abdominal:      General: Abdomen is flat. Bowel sounds are normal.      Palpations: Abdomen is soft.   Musculoskeletal:         General: No swelling or tenderness. Normal range of motion.      Cervical back: Normal range of motion and neck supple.   Skin:     General: Skin is warm and dry.      Findings: No rash.   Neurological:      General: No focal deficit present.      Mental Status: She is alert and oriented to person, place, and time.      Cranial Nerves: No cranial nerve deficit.      Sensory: No sensory deficit.   Psychiatric:         Mood and Affect: Mood normal.         Behavior: Behavior normal.         Outpatient Follow-Up  Future Appointments   Date Time Provider Department Center   10/19/2023  4:45 PM Eric Aguilera MD [id number] [place]   11/3/2023  9:30 AM Eric Aguilera MD [id number] [place]         Prateek London MD

## 2023-10-17 ENCOUNTER — PATIENT OUTREACH (OUTPATIENT)
Dept: PRIMARY CARE | Facility: CLINIC | Age: 28
End: 2023-10-17
Payer: COMMERCIAL

## 2023-10-17 DIAGNOSIS — R04.2 COUGHING BLOOD: ICD-10-CM

## 2023-10-17 NOTE — PROGRESS NOTES
Discharge Facility:  [facility] -observation     Discharge Diagnosis:  Coughing blood     Admission Date:10/16/23  Discharge Date: 10/16/23    PCP Appointment Date:  10/19/2023 4:45 PM        [facility]    Eric Aguilear MD      11/3/2023 9:30 AM     [facility]IMARY [facility]ARE [facility]ABL[facility]   Eric Aguilera MD       Hospital Encounter and Summary: Linked    Left message x 2 for Pretty that I was following up on her hospital visits. Asked to call me back to review and encouraged her to keep appts with PCP coming up.

## 2023-10-19 ENCOUNTER — OFFICE VISIT (OUTPATIENT)
Dept: PRIMARY CARE | Facility: CLINIC | Age: 28
End: 2023-10-19
Payer: COMMERCIAL

## 2023-10-19 VITALS
DIASTOLIC BLOOD PRESSURE: 82 MMHG | HEIGHT: 64 IN | OXYGEN SATURATION: 98 % | SYSTOLIC BLOOD PRESSURE: 108 MMHG | HEART RATE: 99 BPM | BODY MASS INDEX: 21.34 KG/M2 | WEIGHT: 125 LBS

## 2023-10-19 DIAGNOSIS — F41.1 GENERALIZED ANXIETY DISORDER: ICD-10-CM

## 2023-10-19 DIAGNOSIS — Z79.899 MEDICATION MANAGEMENT: ICD-10-CM

## 2023-10-19 DIAGNOSIS — K21.01 GASTROESOPHAGEAL REFLUX DISEASE WITH ESOPHAGITIS AND HEMORRHAGE: ICD-10-CM

## 2023-10-19 DIAGNOSIS — I78.0 OSLER HEMORRHAGIC TELANGIECTASIA SYNDROME (CMS-HCC): ICD-10-CM

## 2023-10-19 DIAGNOSIS — K29.01 GASTROINTESTINAL HEMORRHAGE ASSOCIATED WITH ACUTE GASTRITIS: Primary | ICD-10-CM

## 2023-10-19 DIAGNOSIS — R10.84 GENERALIZED ABDOMINAL PAIN: ICD-10-CM

## 2023-10-19 PROCEDURE — 80373 DRUG SCREENING TRAMADOL: CPT

## 2023-10-19 PROCEDURE — 80361 OPIATES 1 OR MORE: CPT

## 2023-10-19 PROCEDURE — 82570 ASSAY OF URINE CREATININE: CPT

## 2023-10-19 PROCEDURE — 80358 DRUG SCREENING METHADONE: CPT

## 2023-10-19 PROCEDURE — 80368 SEDATIVE HYPNOTICS: CPT

## 2023-10-19 PROCEDURE — 80354 DRUG SCREENING FENTANYL: CPT

## 2023-10-19 PROCEDURE — 80307 DRUG TEST PRSMV CHEM ANLYZR: CPT

## 2023-10-19 PROCEDURE — 80365 DRUG SCREENING OXYCODONE: CPT

## 2023-10-19 PROCEDURE — 80346 BENZODIAZEPINES1-12: CPT

## 2023-10-19 PROCEDURE — 99496 TRANSJ CARE MGMT HIGH F2F 7D: CPT | Performed by: FAMILY MEDICINE

## 2023-10-19 RX ORDER — OXYCODONE AND ACETAMINOPHEN 5; 325 MG/1; MG/1
1 TABLET ORAL EVERY 8 HOURS PRN
Qty: 90 TABLET | Refills: 0 | Status: SHIPPED | OUTPATIENT
Start: 2023-10-19 | End: 2023-11-13 | Stop reason: SDUPTHER

## 2023-10-19 RX ORDER — DOCUSATE SODIUM 100 MG/1
100 CAPSULE, LIQUID FILLED ORAL 2 TIMES DAILY
COMMUNITY
Start: 2023-10-16 | End: 2023-10-21

## 2023-10-19 RX ORDER — CITALOPRAM 20 MG/1
20 TABLET, FILM COATED ORAL DAILY
Qty: 90 TABLET | Refills: 1 | Status: SHIPPED | OUTPATIENT
Start: 2023-10-19 | End: 2024-04-16

## 2023-10-19 NOTE — PROGRESS NOTES
Subjective   Patient ID: Pretty Devine is a 28 y.o. female who presents for Follow-up.    Pot was seen at [facility] on 10/15/23 due to lower quadrant abdominal pain. Sx of coughing up blood.   Discharged the next day.     Pt states she is well currently has the recurring abdominal pain cough has subsided.     Discuss medication          Review of Systems  12 Systems have been reviewed as follows.  Constitutional: Fever, weight gain, weight loss, appetite change, night sweats, fatigue, chills.  Eyes : blurry, double vision, vision, loss, tearing, redness, pain, sensitivity to light, glaucoma.  Ears, nose, mouth, and throat: Hearing loss, ringing in the ears, ear pain, nasal congestion, nasal drainage, nosebleeds, mouth, throat, irritation tooth problem.  Cardiovascular :chest pain, pressure, heart racing, palpitations, sweating, leg swelling, high or low blood pressure  Pulmonary: Cough, yellow or green sputum, blood and sputum, shortness of breath, wheezing  Gastrointestinal: Nausea, vomiting, diarrhea, constipation, pain, blood in stool, or vomitus, heartburn, difficulty swallowing  Genitourinary: incontinence, abnormal bleeding, abnormal discharge, urinary frequency, urinary hesitancy, pain, impotence sexual problem, infection, urinary retention  Musculoskeletal: Pain, stiffness, joint, redness or warmth, arthritis, back pain, weakness, muscle wasting, sprain or fracture  Neuro: Weight weakness, dizziness, change in voice, change in taste change in vision, change in hearing, loss, or change of sensation, trouble walking, balance problems coordination problems, shaking, speech problem  Endocrine , cold or heat intolerance, blood sugar problem, weight gain or loss missed periods hot flashes, sweats, change in body hair, change in libido, increased thirst, increased urination  Heme/lymph: Swelling, bleeding, problem anemia, bruising, enlarged lymph nodes  Allergic/immunologic: H. plus nasal drip, watery  "itchy eyes, nasal drainage, immunosuppressed  The above were reviewed and noted negative except as noted in HPI and Problem List.    Objective   /82   Pulse 99   Ht 1.626 m (5' 4\")   Wt 56.7 kg (125 lb)   SpO2 98%   BMI 21.46 kg/m²     Physical Exam  Constitutional: Well developed, well nourished, alert and in no acute distress   Eyes: Normal external exam. Pupils equally round and reactive to light with normal accommodation and extraocular movements intact.  Neck: Supple, no lymphadenopathy or masses.   Cardiovascular: Regular rate and rhythm, normal S1 and S2, no murmurs, gallops, or rubs. Radial pulses normal. No peripheral edema.  Pulmonary: No respiratory distress, lungs clear to auscultation bilaterally. No wheezes, rhonchi, rales.  Abdomen: soft,non tender, non distended, without masses or HSM  Skin: Warm, well perfused, normal skin turgor and color.   Neurologic: Cranial nerves II-XII grossly intact.   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    Relevant Medications    oxyCODONE-acetaminophen (Percocet) 5-325 mg tablet    Other Relevant Orders    Follow Up In [facility] - PCP - Established    Abdominal pain R10.9    Relevant Medications    oxyCODONE-acetaminophen (Percocet) 5-325 mg tablet    Other Relevant Orders    Follow Up In [facility] - PCP - Established    Gastrointestinal hemorrhage associated with gastritis - Primary K29.71    GERD (gastroesophageal reflux disease) K21.9     Other Visit Diagnoses         Codes    Medication management     Z79.899    Relevant Orders    Opiate/Opioid/Benzo Extended " Prescription Compliance    OOB Internal Tracking          UDS today       Continue current medications and therapy for chronic medical conditions    Provider Attestation - Scribe documentation    All medical record entries made by the Scribe were at my direction and personally dictated by me. I have reviewed the chart and agree that the record accurately reflects my personal performance of the history, physical exam, discussion and plan.    Scribe Attestation  By signing my name below, I, Eric Aguilera MD   , Scribe   attest that this documentation has been prepared under the direction and in the presence of Eric Aguilera MD.    I have personally reviewed the OARRS report with the patient and have considered the risk of abuse, addiction, dependence and diversion.    Patient's use of medication is allowing patient to be able to perform ADL's. Patient is always being evaluated for the possibility of lowering the medication dosage.    Increase celexa to 20 mg daily

## 2023-10-20 LAB
AMPHETAMINES UR QL SCN: NORMAL
BARBITURATES UR QL SCN: NORMAL
BZE UR QL SCN: NORMAL
CANNABINOIDS UR QL SCN: NORMAL
CREAT UR-MCNC: 70.2 MG/DL (ref 20–320)
PCP UR QL SCN: NORMAL

## 2023-10-21 ENCOUNTER — HOSPITAL ENCOUNTER (OUTPATIENT)
Dept: CARDIOLOGY | Facility: HOSPITAL | Age: 28
Discharge: HOME | End: 2023-10-21
Payer: COMMERCIAL

## 2023-10-21 LAB
ATRIAL RATE: 128 BPM
ATRIAL RATE: 94 BPM
P AXIS: 57 DEGREES
P AXIS: 76 DEGREES
P OFFSET: 194 MS
P OFFSET: 205 MS
P ONSET: 139 MS
P ONSET: 149 MS
PR INTERVAL: 140 MS
PR INTERVAL: 158 MS
Q ONSET: 218 MS
Q ONSET: 219 MS
QRS COUNT: 16 BEATS
QRS COUNT: 21 BEATS
QRS DURATION: 82 MS
QRS DURATION: 92 MS
QT INTERVAL: 308 MS
QT INTERVAL: 352 MS
QTC CALCULATION(BAZETT): 440 MS
QTC CALCULATION(BAZETT): 449 MS
QTC FREDERICIA: 396 MS
QTC FREDERICIA: 408 MS
R AXIS: 78 DEGREES
R AXIS: 95 DEGREES
T AXIS: 12 DEGREES
T AXIS: 33 DEGREES
T OFFSET: 373 MS
T OFFSET: 394 MS
VENTRICULAR RATE: 128 BPM
VENTRICULAR RATE: 94 BPM

## 2023-10-24 ENCOUNTER — TELEPHONE (OUTPATIENT)
Dept: PRIMARY CARE | Facility: CLINIC | Age: 28
End: 2023-10-24
Payer: COMMERCIAL

## 2023-10-24 NOTE — TELEPHONE ENCOUNTER
Dr. Aguilera Pt    Wants to know if pt can increase pain medication to 4x instead of the 3x a day. Pt doesn't feel like her pain is being controlled. Pt wants to be notified whether it is able to be changed or not. Please advise, thank you.      Pretty [phone]

## 2023-10-25 LAB

## 2023-10-28 ENCOUNTER — APPOINTMENT (OUTPATIENT)
Dept: RADIOLOGY | Facility: HOSPITAL | Age: 28
End: 2023-10-28
Payer: COMMERCIAL

## 2023-10-28 ENCOUNTER — HOSPITAL ENCOUNTER (EMERGENCY)
Facility: HOSPITAL | Age: 28
Discharge: LEFT BEFORE DISCHARGE | End: 2023-10-28
Attending: EMERGENCY MEDICINE
Payer: COMMERCIAL

## 2023-10-28 VITALS
BODY MASS INDEX: 20.6 KG/M2 | RESPIRATION RATE: 16 BRPM | SYSTOLIC BLOOD PRESSURE: 136 MMHG | DIASTOLIC BLOOD PRESSURE: 63 MMHG | WEIGHT: 120 LBS | HEART RATE: 103 BPM | TEMPERATURE: 98.8 F | OXYGEN SATURATION: 100 %

## 2023-10-28 DIAGNOSIS — R07.9 CHEST PAIN, UNSPECIFIED TYPE: Primary | ICD-10-CM

## 2023-10-28 LAB
ALBUMIN SERPL BCP-MCNC: 4.6 G/DL (ref 3.4–5)
ALP SERPL-CCNC: 83 U/L (ref 33–110)
ALT SERPL W P-5'-P-CCNC: 23 U/L (ref 7–45)
ANION GAP SERPL CALC-SCNC: 10 MMOL/L (ref 10–20)
AST SERPL W P-5'-P-CCNC: 26 U/L (ref 9–39)
B-HCG SERPL-ACNC: <2 MIU/ML
BASOPHILS # BLD AUTO: 0.01 X10*3/UL (ref 0–0.1)
BASOPHILS NFR BLD AUTO: 0.3 %
BILIRUB SERPL-MCNC: 0.3 MG/DL (ref 0–1.2)
BUN SERPL-MCNC: 13 MG/DL (ref 6–23)
CALCIUM SERPL-MCNC: 9.1 MG/DL (ref 8.6–10.3)
CARDIAC TROPONIN I PNL SERPL HS: 3 NG/L (ref 0–13)
CHLORIDE SERPL-SCNC: 104 MMOL/L (ref 98–107)
CO2 SERPL-SCNC: 27 MMOL/L (ref 21–32)
CREAT SERPL-MCNC: 0.76 MG/DL (ref 0.5–1.05)
EOSINOPHIL # BLD AUTO: 0.06 X10*3/UL (ref 0–0.7)
EOSINOPHIL NFR BLD AUTO: 1.7 %
ERYTHROCYTE [DISTWIDTH] IN BLOOD BY AUTOMATED COUNT: 18.3 % (ref 11.5–14.5)
GFR SERPL CREATININE-BSD FRML MDRD: >90 ML/MIN/1.73M*2
GLUCOSE SERPL-MCNC: 107 MG/DL (ref 74–99)
HCT VFR BLD AUTO: 35.2 % (ref 36–46)
HGB BLD-MCNC: 10.7 G/DL (ref 12–16)
IMM GRANULOCYTES # BLD AUTO: 0.01 X10*3/UL (ref 0–0.7)
IMM GRANULOCYTES NFR BLD AUTO: 0.3 % (ref 0–0.9)
LYMPHOCYTES # BLD AUTO: 0.87 X10*3/UL (ref 1.2–4.8)
LYMPHOCYTES NFR BLD AUTO: 24.6 %
MAGNESIUM SERPL-MCNC: 1.78 MG/DL (ref 1.6–2.4)
MCH RBC QN AUTO: 23 PG (ref 26–34)
MCHC RBC AUTO-ENTMCNC: 30.4 G/DL (ref 32–36)
MCV RBC AUTO: 76 FL (ref 80–100)
MONOCYTES # BLD AUTO: 0.41 X10*3/UL (ref 0.1–1)
MONOCYTES NFR BLD AUTO: 11.6 %
NEUTROPHILS # BLD AUTO: 2.17 X10*3/UL (ref 1.2–7.7)
NEUTROPHILS NFR BLD AUTO: 61.5 %
NRBC BLD-RTO: 0 /100 WBCS (ref 0–0)
PLATELET # BLD AUTO: 372 X10*3/UL (ref 150–450)
PMV BLD AUTO: 9.4 FL (ref 7.5–11.5)
POTASSIUM SERPL-SCNC: 3.9 MMOL/L (ref 3.5–5.3)
PROT SERPL-MCNC: 7.4 G/DL (ref 6.4–8.2)
RBC # BLD AUTO: 4.65 X10*6/UL (ref 4–5.2)
SODIUM SERPL-SCNC: 137 MMOL/L (ref 136–145)
WBC # BLD AUTO: 3.5 X10*3/UL (ref 4.4–11.3)

## 2023-10-28 PROCEDURE — 71045 X-RAY EXAM CHEST 1 VIEW: CPT | Mod: FOREIGN READ | Performed by: RADIOLOGY

## 2023-10-28 PROCEDURE — 84702 CHORIONIC GONADOTROPIN TEST: CPT

## 2023-10-28 PROCEDURE — 71045 X-RAY EXAM CHEST 1 VIEW: CPT | Mod: FR

## 2023-10-28 PROCEDURE — 83735 ASSAY OF MAGNESIUM: CPT

## 2023-10-28 PROCEDURE — 99284 EMERGENCY DEPT VISIT MOD MDM: CPT | Performed by: EMERGENCY MEDICINE

## 2023-10-28 PROCEDURE — 36415 COLL VENOUS BLD VENIPUNCTURE: CPT

## 2023-10-28 PROCEDURE — 99283 EMERGENCY DEPT VISIT LOW MDM: CPT | Mod: 25

## 2023-10-28 PROCEDURE — 99285 EMERGENCY DEPT VISIT HI MDM: CPT | Performed by: EMERGENCY MEDICINE

## 2023-10-28 PROCEDURE — 85025 COMPLETE CBC W/AUTO DIFF WBC: CPT

## 2023-10-28 PROCEDURE — 84484 ASSAY OF TROPONIN QUANT: CPT

## 2023-10-28 PROCEDURE — 80053 COMPREHEN METABOLIC PANEL: CPT

## 2023-10-28 RX ORDER — ALUMINUM HYDROXIDE, MAGNESIUM HYDROXIDE, AND SIMETHICONE 1200; 120; 1200 MG/30ML; MG/30ML; MG/30ML
10 SUSPENSION ORAL ONCE
Status: DISCONTINUED | OUTPATIENT
Start: 2023-10-28 | End: 2023-10-28 | Stop reason: HOSPADM

## 2023-10-28 RX ORDER — PANTOPRAZOLE SODIUM 40 MG/10ML
40 INJECTION, POWDER, LYOPHILIZED, FOR SOLUTION INTRAVENOUS ONCE
Status: DISCONTINUED | OUTPATIENT
Start: 2023-10-28 | End: 2023-10-28 | Stop reason: HOSPADM

## 2023-10-28 ASSESSMENT — PAIN DESCRIPTION - LOCATION: LOCATION: CHEST

## 2023-10-28 ASSESSMENT — LIFESTYLE VARIABLES
HAVE YOU EVER FELT YOU SHOULD CUT DOWN ON YOUR DRINKING: NO
REASON UNABLE TO ASSESS: NO
EVER FELT BAD OR GUILTY ABOUT YOUR DRINKING: NO
HAVE PEOPLE ANNOYED YOU BY CRITICIZING YOUR DRINKING: NO
EVER HAD A DRINK FIRST THING IN THE MORNING TO STEADY YOUR NERVES TO GET RID OF A HANGOVER: NO

## 2023-10-28 ASSESSMENT — PAIN SCALES - GENERAL
PAINLEVEL_OUTOF10: 5 - MODERATE PAIN
PAINLEVEL_OUTOF10: 7

## 2023-10-28 ASSESSMENT — PAIN DESCRIPTION - DESCRIPTORS
DESCRIPTORS: ACHING

## 2023-10-28 ASSESSMENT — PAIN DESCRIPTION - PAIN TYPE: TYPE: ACUTE PAIN

## 2023-10-28 ASSESSMENT — PAIN DESCRIPTION - PROGRESSION: CLINICAL_PROGRESSION: NOT CHANGED

## 2023-10-28 ASSESSMENT — COLUMBIA-SUICIDE SEVERITY RATING SCALE - C-SSRS
6. HAVE YOU EVER DONE ANYTHING, STARTED TO DO ANYTHING, OR PREPARED TO DO ANYTHING TO END YOUR LIFE?: NO
1. IN THE PAST MONTH, HAVE YOU WISHED YOU WERE DEAD OR WISHED YOU COULD GO TO SLEEP AND NOT WAKE UP?: NO
2. HAVE YOU ACTUALLY HAD ANY THOUGHTS OF KILLING YOURSELF?: NO

## 2023-10-28 ASSESSMENT — PAIN - FUNCTIONAL ASSESSMENT: PAIN_FUNCTIONAL_ASSESSMENT: 0-10

## 2023-10-28 ASSESSMENT — PAIN DESCRIPTION - FREQUENCY: FREQUENCY: INTERMITTENT

## 2023-10-28 ASSESSMENT — PAIN DESCRIPTION - ORIENTATION: ORIENTATION: ANTERIOR

## 2023-10-28 ASSESSMENT — PAIN DESCRIPTION - ONSET: ONSET: ONGOING

## 2023-10-28 NOTE — ED TRIAGE NOTES
Pt came from home stated 1130 started to have mid sternal chest pain. Pt denies changes in vision ors peech. Pt denies nausea, vomiting, diarrhea, or fevers.

## 2023-10-28 NOTE — ED NOTES
Pt leaving emergency department AMA. All risks communicated with patient with patient understanding. AMA paperwork signed with RN and MD at bedside.     Jennifer Chavez RN  10/28/23 5314

## 2023-10-28 NOTE — ED PROVIDER NOTES
HPI   Chief Complaint   Patient presents with    Chest Pain       Patient is a 28-year-old female with history of Osler-Weber-Rendu syndrome and AVMs presenting with chest pain that began 2 hours ago.  Patient describes pain as midsternal, 8/10, without radiation, worse with movement, and without palliating factors.  Patient takes oxycodone 3 times daily and tried this at home without relief.  She does report associated dyspnea and lightheadedness.  She reports that this feels like previous AVM bleeding and feels that she can taste blood in her mouth.  She denies syncope, fall, nausea or vomiting, or fever/chills.  Patient does have a personal history of DVT and PE, most recent in 2022.                        Paris Coma Scale Score: 15                  Patient History   Past Medical History:   Diagnosis Date    Acute upper respiratory infection, unspecified     Acute upper respiratory infection    Disruption of external operation (surgical) wound, not elsewhere classified, initial encounter 09/04/2019    Dehiscence of incision, initial encounter    Effusion, left hip 05/18/2015    Swelling of left hip joint    Encounter for general adult medical examination without abnormal findings 08/19/2020    Encounter for Medicare annual wellness exam    Encounter for other preprocedural examination     Other specified pre-operative examination    Encounter for other preprocedural examination     Pre-op testing    Fever presenting with conditions classified elsewhere 09/10/2018    Fever in other diseases    Fracture of unspecified part of neck of unspecified femur, initial encounter for closed fracture (CMS/[facility]) 05/06/2015    Femoral neck fracture    Jaw pain 03/03/2021    Mandible pain    Other conditions influencing health status 10/29/2019    Inflammation    Other specified postprocedural states     S/P coronary angiogram    Other symptoms and signs involving the musculoskeletal system 01/10/2017    Weakness of both lower  extremities    Pain in unspecified ankle and joints of unspecified foot 01/11/2022    Ankle pain    Pain in unspecified hip 04/09/2019    Hip pain    Pelvic and perineal pain 05/18/2015    Pelvic pain in female    Personal history of diseases of the blood and blood-forming organs and certain disorders involving the immune mechanism 04/02/2021    History of anemia    Personal history of diseases of the blood and blood-forming organs and certain disorders involving the immune mechanism 01/12/2022    History of anemia    Personal history of diseases of the skin and subcutaneous tissue 01/13/2022    History of folliculitis    Personal history of other diseases of the circulatory system 04/25/2019    History of paroxysmal supraventricular tachycardia    Personal history of other diseases of the digestive system 07/16/2020    History of hematemesis    Personal history of other diseases of the nervous system and sense organs 09/11/2019    History of ear pain    Personal history of other diseases of urinary system 11/12/2019    History of hematuria    Personal history of other drug therapy     History of drug therapy    Personal history of other drug therapy     History of drug therapy    Personal history of other endocrine, nutritional and metabolic disease 05/11/2016    History of iron deficiency    Personal history of other endocrine, nutritional and metabolic disease 08/21/2020    History of obesity    Personal history of other infectious and parasitic diseases     History of herpes labialis    Personal history of other mental and behavioral disorders 06/03/2021    History of depression    Personal history of other specified conditions 08/21/2020    History of persistent cough    Personal history of other specified conditions 09/06/2017    History of epistaxis    Personal history of other specified conditions 05/24/2020    History of jaundice    Personal history of other specified conditions 07/13/2020    History of  dysphagia    Personal history of other venous thrombosis and embolism 12/29/2022    History of deep venous thrombosis    Personal history of pulmonary embolism 03/29/2021    History of pulmonary embolism    Shortness of breath 09/10/2018    SOB (shortness of breath) on exertion    Trochanteric bursitis, unspecified hip 08/04/2015    Trochanteric bursitis     Past Surgical History:   Procedure Laterality Date    CT ABDOMEN PELVIS ANGIOGRAM W AND/OR WO IV CONTRAST  9/12/2019    CT ABDOMEN PELVIS ANGIOGRAM W AND/OR WO IV CONTRAST 9/12/2019 [facility]    CT ABDOMEN PELVIS ANGIOGRAM W AND/OR WO IV CONTRAST  10/22/2019    CT ABDOMEN PELVIS ANGIOGRAM W AND/OR WO IV CONTRAST 10/22/2019 [facility]    CT ABDOMEN PELVIS ANGIOGRAM W AND/OR WO IV CONTRAST  4/3/2022    CT ABDOMEN PELVIS ANGIOGRAM W AND/OR WO IV CONTRAST 4/3/2022 [facility]    CT HEAD ANGIO W AND WO IV CONTRAST  4/11/2019    CT HEAD ANGIO W AND WO IV CONTRAST 4/11/2019 [facility]    CT HEAD ANGIO W AND WO IV CONTRAST  9/26/2020    CT HEAD ANGIO W AND WO IV CONTRAST 9/26/2020 [facility]    CT HEAD ANGIO W AND WO IV CONTRAST  9/14/2022    CT HEAD ANGIO W AND WO IV CONTRAST 9/14/2022 [facility]    CT NECK ANGIO W AND WO IV CONTRAST  9/14/2022    CT NECK ANGIO W AND WO IV CONTRAST 9/14/2022 [facility]    IR INTERVENTION VENOUS EMBOLIZATION  2/19/2020    IR INTERVENTION VENOUS EMBOLIZATION 2/19/2020 [facility]    IR INTERVENTION VENOUS SCLEROSIS  4/22/2019    IR INTERVENTION VENOUS SCLEROSIS 4/22/2019 [facility]    MR CHEST ANGIO W AND WO IV CONTRAST  2/5/2020    MR CHEST ANGIO W AND WO IV CONTRAST 2/5/2020 [facility]    MR HEAD ANGIO WO IV CONTRAST  12/10/2020    MR HEAD ANGIO WO IV CONTRAST 12/10/2020 [facility]    OTHER SURGICAL HISTORY  04/06/2015    Hip Surgery Left    OTHER SURGICAL HISTORY  01/13/2022    Hysterectomy    OTHER SURGICAL HISTORY  09/24/2018    Abdominal  Surgery    OTHER SURGICAL HISTORY  09/24/2018    Angioplasty    OTHER SURGICAL HISTORY  09/24/2018    Central Intravenous Catheter     Family History   Problem Relation Name Age of Onset    Colon cancer Mother      Other (gene mutation) Mother      Other (AVM) Father      Other (cerebrovascular accident  [Other]) Father      Other (Cardiac disorder [Other]) Father      Breast cancer Mother's Sister      Other (gene mutation) Mother's Sister      Breast cancer Father's Sister      Leukemia Father's Brother      Skin cancer Other Family History     Breast cancer Maternal Cousin      Other (gene mutation) Maternal Cousin       Social History     Tobacco Use    Smoking status: Unknown    Smokeless tobacco: Not on file   Vaping Use    Vaping Use: Never used   Substance Use Topics    Alcohol use: Yes     Alcohol/week: 1.0 standard drink of alcohol     Types: 1 Standard drinks or equivalent per week     Comment: socially    Drug use: Never       Physical Exam   ED Triage Vitals [10/28/23 1335]   Temp Heart Rate Resp BP   37.1 °C (98.8 °F) 103 16 136/63      SpO2 Temp Source Heart Rate Source Patient Position   100 % Temporal Monitor Sitting      BP Location FiO2 (%)     Right arm --       Physical Exam  Vitals and nursing note reviewed.   Constitutional:       General: She is not in acute distress.     Appearance: Normal appearance. She is not toxic-appearing.   HENT:      Head: Normocephalic and atraumatic.      Right Ear: Tympanic membrane, ear canal and external ear normal.      Left Ear: Tympanic membrane, ear canal and external ear normal.      Nose: Nose normal.      Mouth/Throat:      Mouth: Mucous membranes are moist.      Pharynx: No oropharyngeal exudate or posterior oropharyngeal erythema.   Eyes:      General: No scleral icterus.     Extraocular Movements: Extraocular movements intact.      Pupils: Pupils are equal, round, and reactive to light.   Cardiovascular:      Rate and Rhythm: Normal rate and regular  rhythm.      Pulses: Normal pulses.      Heart sounds: Normal heart sounds. No murmur heard.     No friction rub. No gallop.   Pulmonary:      Effort: Pulmonary effort is normal. No respiratory distress.      Breath sounds: Normal breath sounds. No stridor. No wheezing, rhonchi or rales.   Chest:      Chest wall: Tenderness present.      Comments: Tenderness to palpation of chest wall, however this does not reproduce the chest pain she is experiencing  Abdominal:      General: Abdomen is flat. Bowel sounds are normal. There is no distension.      Palpations: Abdomen is soft. There is no mass.      Tenderness: There is no abdominal tenderness. There is no right CVA tenderness, left CVA tenderness, guarding or rebound.      Hernia: No hernia is present.   Musculoskeletal:         General: No swelling, deformity or signs of injury. Normal range of motion.      Cervical back: Normal range of motion and neck supple. No rigidity.      Right lower leg: No edema.      Left lower leg: No edema.   Lymphadenopathy:      Cervical: No cervical adenopathy.   Skin:     General: Skin is warm and dry.      Capillary Refill: Capillary refill takes less than 2 seconds.      Findings: No rash.   Neurological:      General: No focal deficit present.      Mental Status: She is alert and oriented to person, place, and time. Mental status is at baseline.      Cranial Nerves: No cranial nerve deficit.      Sensory: No sensory deficit.      Motor: No weakness.      Coordination: Coordination normal.   Psychiatric:         Mood and Affect: Mood normal.         Behavior: Behavior normal.         ED Course & MDM   ED Course as of 10/28/23 1652   Sat Oct 28, 2023   1443 CBC and Auto Differential(!)  CBC shows chronic microcytic anemia, improved from 12 days ago. [JR]   1610 EKG shows normal sinus rhythm, rate of 99 bpm, normal axis, normal intervals, no acute ST-T wave changes  [JR]   1610 Pt signed out AMA, did not want to stay for CTA chest to  r/o PE. States her daughter was in an accident.  [JR]   1651 Troponin I, High Sensitivity: 3  Negative Troponin   [JR]   1651 XR chest 1 view  Negative CXR  [JR]      ED Course User Index  [JR] Los Kumar DO       Medical Decision Making  Patient is a 28-year-old female with history of AVMs and Osler-Weber-Rendu syndrome presenting with chest pain that started today.  In the ED, vital signs are stable with exception of tachycardia to 103 bpm.  Evaluation to include CBC, troponin, CMP, EKG, CXR, CT for PE.  CBC showed microcytic anemia which is chronic for her, no significant leukocytosis.  Troponin was negative, CMP showed no significant electrolyte, renal, hepatobiliary derangement.  Patient was to get CT scan but refused, reporting that her daughter had an emergency and she needed to leave immediately.  Patient signed out AMA, discussed significant risk of death or permanent disability.  Patient verbalizes understanding still wants to leave AGAINST MEDICAL ADVICE.     Discussed with attending physician Dr. Aceves.    DO Los Hwang DO  Resident  10/28/23 1657      The patient was seen by the resident/fellow.  I have personally performed a substantive portion of the encounter.  I have seen and examined the patient; agree with the workup, evaluation, MDM, management and diagnosis.  The care plan has been discussed with the resident/fellow; I have reviewed the resident/fellow’s note and agree with the documented findings with the exception/addition of the following:    The patient is elected to leave the emergency room AGAINST MEDICAL ADVICE knowing that she has a history of pulmonary embolism and AVMs, and her last AVM bleed felt like this.  I notified the patient the proper and most appropriate course of action will be serial troponins, serial H&H's, CT scan of the chest with IV contrast to rule out worst-case scenario an acute life-threatening issues.  Patient states she  understands but I was emergency at home to take care of intent to and cannot stay for the work-up.  She wants to leave AGAINST MEDICAL ADVICE knowing the work-up is not complete and knowing that she has a risk of death and/or permanent disability by leaving now.  Therefore the patient was given the AMA paperwork, and signed out AGAINST MEDICAL ADVICE.  She is free to return and she was notified she could return anytime that she changes her mind or develops any worsening concerning or persistent symptoms.     Obey Aceves, DO  10/28/23 1915

## 2023-10-30 LAB
AMPHETAMINES UR QL SCN: NORMAL
BARBITURATES UR QL SCN: NORMAL
BZE UR QL SCN: NORMAL
CANNABINOIDS UR QL SCN: NORMAL
CREAT UR-MCNC: 33.6 MG/DL (ref 20–320)
PCP UR QL SCN: NORMAL

## 2023-11-02 ENCOUNTER — HOSPITAL ENCOUNTER (OUTPATIENT)
Dept: CARDIOLOGY | Facility: HOSPITAL | Age: 28
Discharge: HOME | End: 2023-11-02
Payer: COMMERCIAL

## 2023-11-02 LAB
ATRIAL RATE: 99 BPM
P AXIS: 50 DEGREES
P OFFSET: 192 MS
P ONSET: 150 MS
PR INTERVAL: 132 MS
Q ONSET: 216 MS
QRS COUNT: 16 BEATS
QRS DURATION: 92 MS
QT INTERVAL: 358 MS
QTC CALCULATION(BAZETT): 459 MS
QTC FREDERICIA: 423 MS
R AXIS: 93 DEGREES
T AXIS: 0 DEGREES
T OFFSET: 395 MS
VENTRICULAR RATE: 99 BPM

## 2023-11-02 PROCEDURE — 93005 ELECTROCARDIOGRAM TRACING: CPT

## 2023-11-06 ENCOUNTER — TELEMEDICINE (OUTPATIENT)
Dept: PRIMARY CARE | Facility: CLINIC | Age: 28
End: 2023-11-06
Payer: COMMERCIAL

## 2023-11-06 ENCOUNTER — E-VISIT (OUTPATIENT)
Dept: PRIMARY CARE | Facility: CLINIC | Age: 28
End: 2023-11-06
Payer: COMMERCIAL

## 2023-11-06 DIAGNOSIS — I78.0 OSLER HEMORRHAGIC TELANGIECTASIA SYNDROME (CMS-HCC): ICD-10-CM

## 2023-11-06 DIAGNOSIS — G90.A POTS (POSTURAL ORTHOSTATIC TACHYCARDIA SYNDROME): ICD-10-CM

## 2023-11-06 DIAGNOSIS — F31.9 BIPOLAR 1 DISORDER (MULTI): ICD-10-CM

## 2023-11-06 DIAGNOSIS — K29.01 GASTROINTESTINAL HEMORRHAGE ASSOCIATED WITH ACUTE GASTRITIS: ICD-10-CM

## 2023-11-06 DIAGNOSIS — Q27.30 AVM (ARTERIOVENOUS MALFORMATION) (HHS-HCC): ICD-10-CM

## 2023-11-06 DIAGNOSIS — R10.84 GENERALIZED ABDOMINAL PAIN: ICD-10-CM

## 2023-11-06 DIAGNOSIS — G43.111 INTRACTABLE MIGRAINE WITH AURA WITH STATUS MIGRAINOSUS: ICD-10-CM

## 2023-11-06 DIAGNOSIS — F33.41 RECURRENT MAJOR DEPRESSIVE DISORDER, IN PARTIAL REMISSION (CMS-HCC): ICD-10-CM

## 2023-11-06 DIAGNOSIS — E44.0 MALNUTRITION OF MODERATE DEGREE (MULTI): Primary | ICD-10-CM

## 2023-11-06 PROCEDURE — 99214 OFFICE O/P EST MOD 30 MIN: CPT | Performed by: FAMILY MEDICINE

## 2023-11-06 RX ORDER — OXYCODONE AND ACETAMINOPHEN 5; 325 MG/1; MG/1
1 TABLET ORAL EVERY 6 HOURS PRN
Qty: 120 TABLET | Refills: 0 | Status: CANCELLED | OUTPATIENT
Start: 2023-11-06 | End: 2023-12-06

## 2023-11-06 ASSESSMENT — ENCOUNTER SYMPTOMS: ABDOMINAL PAIN: 1

## 2023-11-06 NOTE — PROGRESS NOTES
Subjective   Patient ID: Pretty Devine is a 29 y.o. female who presents for Abdominal Pain.    Abdominal Pain     patient is following up on abdominal pain.She is asking for refill of the percocet but las tscript was placed on 10/19/23 for 30 days and is 12 days early [facility] end date of 11/18/23.   Patient is request on 10/24/23 to increase the pain medication to 4 times a day.      Review of Systems   Gastrointestinal:  Positive for abdominal pain.     12 Systems have been reviewed as follows.  Constitutional: Fever, weight gain, weight loss, appetite change, night sweats, fatigue, chills.  Eyes : blurry, double vision, vision, loss, tearing, redness, pain, sensitivity to light, glaucoma.  Ears, nose, mouth, and throat: Hearing loss, ringing in the ears, ear pain, nasal congestion, nasal drainage, nosebleeds, mouth, throat, irritation tooth problem.  Cardiovascular :chest pain, pressure, heart racing, palpitations, sweating, leg swelling, high or low blood pressure  Pulmonary: Cough, yellow or green sputum, blood and sputum, shortness of breath, wheezing  Gastrointestinal: Nausea, vomiting, diarrhea, constipation, pain, blood in stool, or vomitus, heartburn, difficulty swallowing  Genitourinary: incontinence, abnormal bleeding, abnormal discharge, urinary frequency, urinary hesitancy, pain, impotence sexual problem, infection, urinary retention  Musculoskeletal: Pain, stiffness, joint, redness or warmth, arthritis, back pain, weakness, muscle wasting, sprain or fracture  Neuro: Weight weakness, dizziness, change in voice, change in taste change in vision, change in hearing, loss, or change of sensation, trouble walking, balance problems coordination problems, shaking, speech problem  Endocrine , cold or heat intolerance, blood sugar problem, weight gain or loss missed periods hot flashes, sweats, change in body hair, change in libido, increased thirst, increased urination  Heme/lymph: Swelling, bleeding, problem  anemia, bruising, enlarged lymph nodes  Allergic/immunologic: H. plus nasal drip, watery itchy eyes, nasal drainage, immunosuppressed  The above were reviewed and noted negative except as noted in HPI and Problem List.      Objective   There were no vitals taken for this visit.    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    Bipolar 1 disorder (Multi) F31.9    AVM (arteriovenous malformation) ([facility]) Q27.30    Gastrointestinal hemorrhage associated with gastritis K29.71    Malnutrition of moderate degree (Multi) - Primary E44.0    POTS (postural orthostatic tachycardia syndrome) G90.A    Recurrent major depressive disorder, in partial remission (CMS-HCC) F33.41     UDS & drug-9 next    Virtual Visit - Audio and Visual Communication Real Time     Continue current medications and therapy for chronic medical conditions    I have personally reviewed the OARRS report with the patient and have considered the risk of abuse, addiction, dependence and diversion.    Patient's use of medication is allowing patient to be able to perform ADL's. Patient is always being evaluated for the possibility of lowering the medication dosage.    Be compliant

## 2023-11-07 DIAGNOSIS — F41.1 GENERALIZED ANXIETY DISORDER: ICD-10-CM

## 2023-11-07 NOTE — TELEPHONE ENCOUNTER
PT JUST CALLED STATING THAT CB TOLD HER SOMEONE WOULD CALL HER FROM THE OFFICE TODAY AND SHE HASN'T GOTTEN A CALL? PLEASE ADVISE, NO MESSAGES IN CHART.

## 2023-11-08 DIAGNOSIS — F41.1 GENERALIZED ANXIETY DISORDER: ICD-10-CM

## 2023-11-08 RX ORDER — HYDROXYZINE HYDROCHLORIDE 25 MG/1
25 TABLET, FILM COATED ORAL 3 TIMES DAILY PRN
Qty: 90 TABLET | Refills: 1 | Status: SHIPPED | OUTPATIENT
Start: 2023-11-08

## 2023-11-09 ENCOUNTER — TELEPHONE (OUTPATIENT)
Dept: PRIMARY CARE | Facility: CLINIC | Age: 28
End: 2023-11-09
Payer: COMMERCIAL

## 2023-11-09 ENCOUNTER — PATIENT OUTREACH (OUTPATIENT)
Dept: PRIMARY CARE | Facility: CLINIC | Age: 28
End: 2023-11-09
Payer: COMMERCIAL

## 2023-11-09 RX ORDER — RISPERIDONE 4 MG/1
TABLET ORAL
Qty: 30 TABLET | Refills: 1 | Status: SHIPPED | OUTPATIENT
Start: 2023-11-09 | End: 2024-01-18

## 2023-11-09 NOTE — TELEPHONE ENCOUNTER
snow Angulo pt  Calling because snow was supposed to call her back after her vv w/him on 11/06 however no one has yet

## 2023-11-09 NOTE — PROGRESS NOTES
Unable to reach patient for one month post discharge and PCP visit follow up call.   LVM with call back number for patient to call if needed to assist with any questions or concerns patient may have.   Encouraged patient to keep Follow up with Dr Aguilera 11/17/23

## 2023-11-13 ENCOUNTER — OFFICE VISIT (OUTPATIENT)
Dept: PRIMARY CARE | Facility: CLINIC | Age: 28
End: 2023-11-13
Payer: COMMERCIAL

## 2023-11-13 VITALS
WEIGHT: 122 LBS | HEART RATE: 94 BPM | HEIGHT: 64 IN | OXYGEN SATURATION: 100 % | BODY MASS INDEX: 20.83 KG/M2 | SYSTOLIC BLOOD PRESSURE: 122 MMHG | RESPIRATION RATE: 16 BRPM | DIASTOLIC BLOOD PRESSURE: 74 MMHG

## 2023-11-13 DIAGNOSIS — R10.84 GENERALIZED ABDOMINAL PAIN: ICD-10-CM

## 2023-11-13 DIAGNOSIS — I78.0 OSLER HEMORRHAGIC TELANGIECTASIA SYNDROME (CMS-HCC): ICD-10-CM

## 2023-11-13 DIAGNOSIS — F33.41 RECURRENT MAJOR DEPRESSIVE DISORDER, IN PARTIAL REMISSION (CMS-HCC): ICD-10-CM

## 2023-11-13 DIAGNOSIS — Z79.899 MEDICATION MANAGEMENT: ICD-10-CM

## 2023-11-13 DIAGNOSIS — M32.9 LUPUS (MULTI): ICD-10-CM

## 2023-11-13 DIAGNOSIS — F31.9 BIPOLAR 1 DISORDER (MULTI): Primary | ICD-10-CM

## 2023-11-13 DIAGNOSIS — D50.8 OTHER IRON DEFICIENCY ANEMIA: ICD-10-CM

## 2023-11-13 DIAGNOSIS — R56.9 SEIZURES (MULTI): ICD-10-CM

## 2023-11-13 PROCEDURE — 3008F BODY MASS INDEX DOCD: CPT | Performed by: FAMILY MEDICINE

## 2023-11-13 PROCEDURE — 3078F DIAST BP <80 MM HG: CPT | Performed by: FAMILY MEDICINE

## 2023-11-13 PROCEDURE — 80365 DRUG SCREENING OXYCODONE: CPT

## 2023-11-13 PROCEDURE — 80373 DRUG SCREENING TRAMADOL: CPT

## 2023-11-13 PROCEDURE — 80346 BENZODIAZEPINES1-12: CPT

## 2023-11-13 PROCEDURE — 80358 DRUG SCREENING METHADONE: CPT

## 2023-11-13 PROCEDURE — 99214 OFFICE O/P EST MOD 30 MIN: CPT | Performed by: FAMILY MEDICINE

## 2023-11-13 PROCEDURE — 3060F POS MICROALBUMINURIA REV: CPT | Performed by: FAMILY MEDICINE

## 2023-11-13 PROCEDURE — 82570 ASSAY OF URINE CREATININE: CPT

## 2023-11-13 PROCEDURE — 80361 OPIATES 1 OR MORE: CPT

## 2023-11-13 PROCEDURE — 80354 DRUG SCREENING FENTANYL: CPT

## 2023-11-13 PROCEDURE — 3074F SYST BP LT 130 MM HG: CPT | Performed by: FAMILY MEDICINE

## 2023-11-13 PROCEDURE — 80307 DRUG TEST PRSMV CHEM ANLYZR: CPT

## 2023-11-13 PROCEDURE — 80368 SEDATIVE HYPNOTICS: CPT

## 2023-11-13 RX ORDER — OXYCODONE AND ACETAMINOPHEN 5; 325 MG/1; MG/1
1 TABLET ORAL EVERY 8 HOURS PRN
Qty: 45 TABLET | Refills: 0 | Status: SHIPPED | OUTPATIENT
Start: 2023-11-13 | End: 2023-11-27 | Stop reason: ALTCHOICE

## 2023-11-13 NOTE — TELEPHONE ENCOUNTER
"Pt states CB was supposed to call her \"a week ago\" and has not. Pt is scheduled for a VV 11/14/2023 at 7:45am  "

## 2023-11-13 NOTE — PROGRESS NOTES
Subjective   Patient ID: Pretty Devine is a 28 y.o. female who presents for GI Problem, Anxiety, and Pain.    HPI Patient is following up after increase of Celexa to 20 mg. Patient is doing well with the increased dose.    She will need refill of the Percocet and uses for pain.  She is unable to take Tylenol or Ibuprofen to cover her pain when she is out of Percocet.     No other refills are needed.        Review of Systems  12 Systems have been reviewed as follows.  Constitutional: Fever, weight gain, weight loss, appetite change, night sweats, fatigue, chills.  Eyes : blurry, double vision, vision, loss, tearing, redness, pain, sensitivity to light, glaucoma.  Ears, nose, mouth, and throat: Hearing loss, ringing in the ears, ear pain, nasal congestion, nasal drainage, nosebleeds, mouth, throat, irritation tooth problem.  Cardiovascular :chest pain, pressure, heart racing, palpitations, sweating, leg swelling, high or low blood pressure  Pulmonary: Cough, yellow or green sputum, blood and sputum, shortness of breath, wheezing  Gastrointestinal: Nausea, vomiting, diarrhea, constipation, pain, blood in stool, or vomitus, heartburn, difficulty swallowing  Genitourinary: incontinence, abnormal bleeding, abnormal discharge, urinary frequency, urinary hesitancy, pain, impotence sexual problem, infection, urinary retention  Musculoskeletal: Pain, stiffness, joint, redness or warmth, arthritis, back pain, weakness, muscle wasting, sprain or fracture  Neuro: Weight weakness, dizziness, change in voice, change in taste change in vision, change in hearing, loss, or change of sensation, trouble walking, balance problems coordination problems, shaking, speech problem  Endocrine , cold or heat intolerance, blood sugar problem, weight gain or loss missed periods hot flashes, sweats, change in body hair, change in libido, increased thirst, increased urination  Heme/lymph: Swelling, bleeding, problem anemia, bruising, enlarged  "lymph nodes  Allergic/immunologic: H. plus nasal drip, watery itchy eyes, nasal drainage, immunosuppressed  The above were reviewed and noted negative except as noted in HPI and Problem List.      Objective   /74 (BP Location: Left arm, Patient Position: Sitting, BP Cuff Size: Adult)   Pulse 94   Resp 16   Ht 1.626 m (5' 4\")   Wt 55.3 kg (122 lb)   SpO2 100%   BMI 20.94 kg/m²     Physical Exam  Constitutional: Well developed, well nourished, alert and in no acute distress   Eyes: Normal external exam. Pupils equally round and reactive to light with normal accommodation and extraocular movements intact.  Neck: Supple, no lymphadenopathy or masses.   Cardiovascular: Regular rate and rhythm, normal S1 and S2, no murmurs, gallops, or rubs. Radial pulses normal. No peripheral edema.  Pulmonary: No respiratory distress, lungs clear to auscultation bilaterally. No wheezes, rhonchi, rales.  Abdomen: soft,non tender, non distended, without masses or HSM  Skin: Warm, well perfused, normal skin turgor and color.   Neurologic: Cranial nerves II-XII grossly intact.   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    Relevant Medications    oxyCODONE-acetaminophen (Percocet) 5-325 mg tablet    Other Relevant Orders    Referral to Pain Medicine    Follow Up In [facility] - PCP - Established    Abdominal pain R10.9    Relevant Medications    oxyCODONE-acetaminophen (Percocet) 5-325 mg tablet    Other Relevant Orders    Referral to Pain Medicine    Follow Up In [facility] - PCP - Established    Bipolar 1 disorder (CMS/[facility]) - Primary F31.9    Relevant Orders    Follow Up In " [facility] - PCP - Established    Iron deficiency anemia D50.9    Relevant Orders    Follow Up In [facility] - PCP - Established    Seizures (CMS/HCC) R56.9    Relevant Orders    Follow Up In [facility] - PCP - Established    Recurrent major depressive disorder, in partial remission (CMS/HCC) F33.41    Relevant Orders    Follow Up In [facility] - PCP - Established       UDS & drug 9 next       Continue current medications and therapy for chronic medical conditions    I have personally reviewed the OARRS report with the patient and have considered the risk of abuse, addiction, dependence and diversion.    Patient's use of medication is allowing patient to be able to perform ADL's. Patient is always being evaluated for the possibility of lowering the medication dosage.    Referral to pain management Dr. Kemar COTO today    Patient states that her Wife is stationed in [place] for the next 6 months and has been over in [place] for the last 30 days. Patient also states that her and her daughter were in the [facility] 11/5-11/11 on a business trip    Patient has given verbal permission to speak to her mother

## 2023-11-13 NOTE — TELEPHONE ENCOUNTER
DR. MERRILL PT  DAV[facility]SEPH THAT CAN'T RUN THE [facility] INSURANCE   Pharmacy    [id number] - [place], [place] - [address] AT [facility] [address]

## 2023-11-14 ENCOUNTER — APPOINTMENT (OUTPATIENT)
Dept: PRIMARY CARE | Facility: CLINIC | Age: 28
End: 2023-11-14
Payer: COMMERCIAL

## 2023-11-14 LAB
AMPHETAMINES UR QL SCN: NORMAL
BARBITURATES UR QL SCN: NORMAL
BZE UR QL SCN: NORMAL
CANNABINOIDS UR QL SCN: NORMAL
CREAT UR-MCNC: 171.6 MG/DL (ref 20–320)
PCP UR QL SCN: NORMAL

## 2023-11-17 ENCOUNTER — APPOINTMENT (OUTPATIENT)
Dept: PRIMARY CARE | Facility: CLINIC | Age: 28
End: 2023-11-17
Payer: COMMERCIAL

## 2023-11-17 LAB

## 2023-11-27 ENCOUNTER — TELEMEDICINE (OUTPATIENT)
Dept: PRIMARY CARE | Facility: CLINIC | Age: 28
End: 2023-11-27
Payer: COMMERCIAL

## 2023-11-27 DIAGNOSIS — F31.9 BIPOLAR 1 DISORDER (MULTI): ICD-10-CM

## 2023-11-27 DIAGNOSIS — G40.909 NONINTRACTABLE EPILEPSY WITHOUT STATUS EPILEPTICUS, UNSPECIFIED EPILEPSY TYPE (MULTI): ICD-10-CM

## 2023-11-27 DIAGNOSIS — F41.1 GENERALIZED ANXIETY DISORDER: ICD-10-CM

## 2023-11-27 DIAGNOSIS — M79.7 FIBROMYALGIA: ICD-10-CM

## 2023-11-27 DIAGNOSIS — G43.709 CHRONIC MIGRAINE WITHOUT AURA WITHOUT STATUS MIGRAINOSUS, NOT INTRACTABLE: ICD-10-CM

## 2023-11-27 DIAGNOSIS — I78.0 OSLER HEMORRHAGIC TELANGIECTASIA SYNDROME (CMS-HCC): ICD-10-CM

## 2023-11-27 DIAGNOSIS — J06.9 VIRAL UPPER RESPIRATORY TRACT INFECTION: Primary | ICD-10-CM

## 2023-11-27 PROCEDURE — 99214 OFFICE O/P EST MOD 30 MIN: CPT | Performed by: FAMILY MEDICINE

## 2023-11-27 RX ORDER — BUPRENORPHINE HYDROCHLORIDE AND NALOXONE HYDROCHLORIDE DIHYDRATE 8; 2 MG/1; MG/1
2 TABLET SUBLINGUAL DAILY
COMMUNITY
Start: 2023-11-24

## 2023-11-27 ASSESSMENT — ENCOUNTER SYMPTOMS: SORE THROAT: 1

## 2023-11-27 NOTE — PROGRESS NOTES
Subjective   Patient ID: Pretty Devine is a 28 y.o. female who presents for Sore Throat.    Pt was seen at [facility] for blood in sputum, and chest pain.     Pt states sx have subsided. Review visit wit PCP.     Sore Throat   This is a new problem. The current episode started yesterday. Associated symptoms include congestion. She has tried nothing for the symptoms.        Review of Systems   HENT:  Positive for congestion and sore throat.    12 Systems have been reviewed as follows.  Constitutional: Fever, weight gain, weight loss, appetite change, night sweats, fatigue, chills.  Eyes : blurry, double vision, vision, loss, tearing, redness, pain, sensitivity to light, glaucoma.  Ears, nose, mouth, and throat: Hearing loss, ringing in the ears, ear pain, nasal congestion, nasal drainage, nosebleeds, mouth, throat, irritation tooth problem.  Cardiovascular :chest pain, pressure, heart racing, palpitations, sweating, leg swelling, high or low blood pressure  Pulmonary: Cough, yellow or green sputum, blood and sputum, shortness of breath, wheezing  Gastrointestinal: Nausea, vomiting, diarrhea, constipation, pain, blood in stool, or vomitus, heartburn, difficulty swallowing  Genitourinary: incontinence, abnormal bleeding, abnormal discharge, urinary frequency, urinary hesitancy, pain, impotence sexual problem, infection, urinary retention  Musculoskeletal: Pain, stiffness, joint, redness or warmth, arthritis, back pain, weakness, muscle wasting, sprain or fracture  Neuro: Weight weakness, dizziness, change in voice, change in taste change in vision, change in hearing, loss, or change of sensation, trouble walking, balance problems coordination problems, shaking, speech problem  Endocrine , cold or heat intolerance, blood sugar problem, weight gain or loss missed periods hot flashes, sweats, change in body hair, change in libido, increased thirst, increased urination  Heme/lymph: Swelling, bleeding, problem  anemia, bruising, enlarged lymph nodes  Allergic/immunologic: H. plus nasal drip, watery itchy eyes, nasal drainage, immunosuppressed  The above were reviewed and noted negative except as noted in HPI and Problem List.      Objective   There were no vitals taken for this visit.    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    Relevant Orders    Follow Up In [facility] - PCP - Established    Bipolar 1 disorder (CMS/HCC) F31.9    Relevant Orders    Follow Up In [facility] - PCP - Established    Migraine without status migrainosus, not intractable G43.909    Relevant Orders    Follow Up In [facility] - PCP - Established     Other Visit Diagnoses         Codes    Viral upper respiratory tract infection    -  Primary J06.9    Relevant Orders    Follow Up In [facility] - PCP - Established          No narcotics or controlled meds  On suboxone    Cont pregabalin     Continue current medications and therapy for chronic medical conditions    UDS next    Patient has given verbal permission to speak to her mother    Did not see Dr. Reinoso    Virtual Visit - Audio and Visual Communication Real Time     I have personally reviewed the OARRS report with the patient and have considered the risk of abuse, addiction, dependence and diversion.    Patient's use of medication is allowing patient to be able to perform ADL's. Patient is always being evaluated for the possibility of lowering the  medication dosage.

## 2023-12-01 ENCOUNTER — TELEMEDICINE (OUTPATIENT)
Dept: PRIMARY CARE | Facility: CLINIC | Age: 28
End: 2023-12-01
Payer: COMMERCIAL

## 2023-12-01 ENCOUNTER — APPOINTMENT (OUTPATIENT)
Dept: PRIMARY CARE | Facility: CLINIC | Age: 28
End: 2023-12-01
Payer: COMMERCIAL

## 2023-12-01 DIAGNOSIS — F31.9 BIPOLAR 1 DISORDER (MULTI): ICD-10-CM

## 2023-12-01 DIAGNOSIS — F41.1 GENERALIZED ANXIETY DISORDER: ICD-10-CM

## 2023-12-01 DIAGNOSIS — F11.21 OPIOID DEPENDENCE IN REMISSION (MULTI): ICD-10-CM

## 2023-12-01 DIAGNOSIS — E78.2 MIXED HYPERLIPIDEMIA: Primary | ICD-10-CM

## 2023-12-01 DIAGNOSIS — G40.909 NONINTRACTABLE EPILEPSY WITHOUT STATUS EPILEPTICUS, UNSPECIFIED EPILEPSY TYPE (MULTI): ICD-10-CM

## 2023-12-01 DIAGNOSIS — G43.709 CHRONIC MIGRAINE WITHOUT AURA WITHOUT STATUS MIGRAINOSUS, NOT INTRACTABLE: ICD-10-CM

## 2023-12-01 PROCEDURE — 99214 OFFICE O/P EST MOD 30 MIN: CPT | Performed by: FAMILY MEDICINE

## 2023-12-01 ASSESSMENT — ENCOUNTER SYMPTOMS
SHORTNESS OF BREATH: 1
HYPERVENTILATION: 1
RESTLESSNESS: 1
PANIC: 1
AGITATION: 1
NERVOUS/ANXIOUS: 1

## 2023-12-01 NOTE — PROGRESS NOTES
Subjective   Patient ID: Pretty Devine is a 28 y.o. female who presents for Anxiety.    Pt denies any other concern today.     Anxiety  Presents for follow-up visit. Symptoms include hyperventilation, nervous/anxious behavior, panic, restlessness and shortness of breath. Symptoms occur occasionally.            Review of Systems   Respiratory:  Positive for shortness of breath.    Psychiatric/Behavioral:  Positive for agitation. The patient is nervous/anxious.      12 Systems have been reviewed as follows.  Constitutional: Fever, weight gain, weight loss, appetite change, night sweats, fatigue, chills.  Eyes : blurry, double vision, vision, loss, tearing, redness, pain, sensitivity to light, glaucoma.  Ears, nose, mouth, and throat: Hearing loss, ringing in the ears, ear pain, nasal congestion, nasal drainage, nosebleeds, mouth, throat, irritation tooth problem.  Cardiovascular :chest pain, pressure, heart racing, palpitations, sweating, leg swelling, high or low blood pressure  Pulmonary: Cough, yellow or green sputum, blood and sputum, shortness of breath, wheezing  Gastrointestinal: Nausea, vomiting, diarrhea, constipation, pain, blood in stool, or vomitus, heartburn, difficulty swallowing  Genitourinary: incontinence, abnormal bleeding, abnormal discharge, urinary frequency, urinary hesitancy, pain, impotence sexual problem, infection, urinary retention  Musculoskeletal: Pain, stiffness, joint, redness or warmth, arthritis, back pain, weakness, muscle wasting, sprain or fracture  Neuro: Weight weakness, dizziness, change in voice, change in taste change in vision, change in hearing, loss, or change of sensation, trouble walking, balance problems coordination problems, shaking, speech problem  Endocrine , cold or heat intolerance, blood sugar problem, weight gain or loss missed periods hot flashes, sweats, change in body hair, change in libido, increased thirst, increased urination  Heme/lymph: Swelling,  bleeding, problem anemia, bruising, enlarged lymph nodes  Allergic/immunologic: H. plus nasal drip, watery itchy eyes, nasal drainage, immunosuppressed  The above were reviewed and noted negative except as noted in HPI and Problem List.      Objective   There were no vitals taken for this visit.    Physical Exam  Constitutional: Well developed, well nourished, alert and in no acute distress   Psychiatric: Mood calm and affect normal      Assessment/Plan   Problem List Items Addressed This Visit             ICD-10-CM    Anxiety disorder F41.9    Relevant Orders    Follow Up In [facility] - PCP - Established    Epilepsy (CMS/[facility]) G40.909    Relevant Orders    Follow Up In [facility] - PCP - Established    Hyperlipidemia - Primary E78.5    Relevant Orders    Follow Up In [facility] - PCP - Established    Bipolar 1 disorder (CMS/[facility]) F31.9    Relevant Orders    Follow Up In [facility] - PCP - Established    Migraine without status migrainosus, not intractable G43.909    Relevant Orders    Follow Up In [facility] - PCP - Established     Other Visit Diagnoses         Codes    Opioid dependence in remission (CMS/[facility])     F11.21    Relevant Orders    Follow Up In [facility] - PCP - Established          No narcotics or controlled meds  On suboxone at a clinic     Cont pregabalin     Continue current medications and therapy for chronic medical conditions     UDS next    Sees [facility] physic for [place]     Patient has given verbal permission to speak to her mother     Did not see Dr. Reinoso    Virtual Visit - Audio and Visual Communication Real Time        Continue current medications and therapy for chronic medical conditions.    Patient was advised importance of proper diet/nutrition in addition adequate hydration. Patient was encouraged moderate exercise program to include 30 minutes daily for 5 days of the week or 150 minutes weekly. Patient will follow-up  with us as scheduled.    I personally reviewed the OARRS report for this patient. I have considered the risks of abuse, dependence, addiction, and diversion.    UDS/CSA: 8/1/23 and 10/5/23

## 2023-12-12 ENCOUNTER — PATIENT OUTREACH (OUTPATIENT)
Dept: PRIMARY CARE | Facility: CLINIC | Age: 28
End: 2023-12-12
Payer: COMMERCIAL

## 2023-12-12 NOTE — PROGRESS NOTES
CM left voicemail for pt on follow up call to assess any final questions or concerns regarding hospitalization. Contact info provided.

## 2023-12-18 ENCOUNTER — SPECIALTY PHARMACY (OUTPATIENT)
Dept: PHARMACY | Facility: CLINIC | Age: 28
End: 2023-12-18

## 2023-12-18 ENCOUNTER — PATIENT MESSAGE (OUTPATIENT)
Dept: PRIMARY CARE | Facility: CLINIC | Age: 28
End: 2023-12-18
Payer: COMMERCIAL

## 2023-12-18 DIAGNOSIS — G43.E11 INTRACTABLE CHRONIC MIGRAINE WITH AURA WITH STATUS MIGRAINOSUS: Primary | ICD-10-CM

## 2023-12-18 DIAGNOSIS — M79.7 FIBROMYALGIA: ICD-10-CM

## 2023-12-18 DIAGNOSIS — G89.4 CHRONIC PAIN SYNDROME: ICD-10-CM

## 2023-12-21 ENCOUNTER — APPOINTMENT (OUTPATIENT)
Dept: GASTROENTEROLOGY | Facility: CLINIC | Age: 28
End: 2023-12-21
Payer: COMMERCIAL

## 2023-12-21 RX ORDER — PREGABALIN 150 MG/1
150 CAPSULE ORAL 3 TIMES DAILY
Qty: 90 CAPSULE | Refills: 0 | Status: SHIPPED | OUTPATIENT
Start: 2023-12-21

## 2023-12-22 ENCOUNTER — OFFICE VISIT (OUTPATIENT)
Dept: PRIMARY CARE | Facility: CLINIC | Age: 28
End: 2023-12-22
Payer: COMMERCIAL

## 2023-12-22 VITALS
WEIGHT: 131.2 LBS | OXYGEN SATURATION: 99 % | HEIGHT: 64 IN | TEMPERATURE: 98.6 F | DIASTOLIC BLOOD PRESSURE: 80 MMHG | BODY MASS INDEX: 22.4 KG/M2 | HEART RATE: 80 BPM | RESPIRATION RATE: 16 BRPM | SYSTOLIC BLOOD PRESSURE: 124 MMHG

## 2023-12-22 DIAGNOSIS — R10.9 ABDOMINAL PAIN, RIGHT LATERAL: Primary | ICD-10-CM

## 2023-12-22 DIAGNOSIS — R10.811 RIGHT UPPER QUADRANT ABDOMINAL TENDERNESS WITHOUT REBOUND TENDERNESS: ICD-10-CM

## 2023-12-22 DIAGNOSIS — R10.10 PAIN OF UPPER ABDOMEN: ICD-10-CM

## 2023-12-22 PROCEDURE — 99213 OFFICE O/P EST LOW 20 MIN: CPT | Performed by: NURSE PRACTITIONER

## 2023-12-22 RX ORDER — DICYCLOMINE HYDROCHLORIDE 10 MG/1
10 CAPSULE ORAL 4 TIMES DAILY PRN
Qty: 20 CAPSULE | Refills: 0 | Status: SHIPPED | OUTPATIENT
Start: 2023-12-22 | End: 2023-12-27

## 2023-12-22 NOTE — PROGRESS NOTES
Subjective   Patient ID: Pretty Devine is a 28 y.o. female who is with complaint of abdominal pain.    HPI  Patient is a 28 y.o. female who CONSULTED AT [facility] today. Patient is with complaints of abdominal pain. Patient states condition started about 3 days ago. she described the abdominal pain as sharp, non radiating, located on the right upper and right middle areas of the abdomen, 5-8 /10, wax and wane but always there, no apparent relation to food intake, type of food, nor relieved by food. she states there are no urinary complaints. she has no nausea, vomiting, diarrhea, chills, fever, nor yellow discoloration of skin / sclerae. she had no recent travel. she denies any intake of water from pond, stream, nor intake of uncooked food.     Review of Systems  General: no weight loss, generally healthy, no fatigue  Head:  no headaches / sinus pain, no vertigo, no injury  Eyes: no diplopia, no tearing, no pain,   Ears: no change in hearing, no tinnitus, no bleeding, no vertigo  Mouth:  no dental difficulties, no gingival bleeding, no sore throat, no loss of sense of taste  Nose: no congestion, no  discharge, no bleeding, no obstruction, no loss of sense of smell  Neck: no stiffness, no pain, no tenderness, no masses, no bruit  Pulmonary: no dyspnea, no wheezing, no hemoptysis, no cough  Cardiovascular: no chest pain, no palpitations, no syncope, no orthopnea  Gastrointestinal: no change in appetite, no dysphagia, (+) abdominal pains, no diarrhea, no emesis, no melena  Genito Urinary: no dysuria, no urinary urgency, no nocturia, no incontinence, no change in nature of urine  Musculoskeletal: no muscle ache, no joint pain, no limitation of range of motion, no paresthesia, no numbness  Constitutional: no fever, no chills, no night sweats    Objective   Physical Exam  General: ambulatory, in no acute distress  Head: normocephalic, no lesions  Eyes: pink palpebral conjunctiva, anicteric  sclerae, PERRLA, EOM's full  Ears: clear external auditory canals, no ear discharge, no bleeding from the ears, tympanic membrane intact  Nose: nasal mucosa normal, no nasal discharge, no bleeding, no obstruction  Throat: clear, no exudate, no lesions  Neck: supple, no masses, no bruits  Chest: symmetrical chest expansion, no lagging, no retractions, clear breath sounds, no rales, no wheezes  Heart: normal rate, regular rhythm, no heaves, no thrills, no murmurs  Abdomen: flat, NABS, soft, (+) direct tenderness on areas of the right upper quadrant and right mid abdominal areas, no rebound tenderness, no mass palpated, SIGNS: no [place], no Rovsings, no Psoas, no Obturator; No CVA tenderness,  Musculoskeletal: no limitation of range of motion, no paralysis, no deformity  Extremities: full and equal peripheral pulses, no edema,    Assessment/Plan   Problem List Items Addressed This Visit             ICD-10-CM    Abdominal pain R10.9    Relevant Medications    dicyclomine (Bentyl) 10 mg capsule    Other Relevant Orders    US abdomen     Other Visit Diagnoses         Codes    Abdominal pain, right lateral    -  Primary R10.9    Relevant Medications    dicyclomine (Bentyl) 10 mg capsule    Other Relevant Orders    US abdomen    Right upper quadrant abdominal tenderness without rebound tenderness     R10.811    Relevant Medications    dicyclomine (Bentyl) 10 mg capsule    Other Relevant Orders    US abdomen        patient for ultrasound of abdomen   Patient assisted by  staff to schedule the procedure  will call patient with official reading from the radiologist  patient verbalized understanding    DISCHARGE SUMMARY:   Probable diagnosis, differential diagnosis, treatment, treatment options, and probable complications were discussed and explained to patient. Patient to take medication/s associated with this visit. she was advised small frequent feedings instead of big meals. Advised to avoid caffeine containing  foods/drinks, alcoholic beverages, tobacco products, and spicy food. Advised to avoid missing meals. Patient to return to clinic if there is worsening or persistence of symptoms. Patient verbalized understanding.     Patient to come back in 7 - 10 days if needed for worsening symptoms.      ADEOLA Coleman-CNP 12/22/23 4:53 PM

## 2023-12-22 NOTE — PROGRESS NOTES
"Subjective   Patient ID: Pretty Devine is a 28 y.o. female who presents for Abdominal Pain (Patient presents in office with a complaint of right lower abdominal pain. Patient admits this was a sudden pain. Patient denies experiencing any complications with eating or drinking. ).    HPI     Review of Systems    Objective   /86 (BP Location: Left arm, Patient Position: Sitting, BP Cuff Size: Adult)   Pulse 80   Resp 16   Ht 1.626 m (5' 4\")   Wt 59.5 kg (131 lb 3.2 oz)   SpO2 99%   BMI 22.52 kg/m²     Physical Exam    Assessment/Plan          "

## 2023-12-23 NOTE — PATIENT INSTRUCTIONS
DISCHARGE SUMMARY:   Probable diagnosis, differential diagnosis, treatment, treatment options, and probable complications were discussed and explained to patient. Patient to take medication/s associated with this visit. she was advised small frequent feedings instead of big meals. Advised to avoid caffeine containing foods/drinks, alcoholic beverages, tobacco products, and spicy food. Advised to avoid missing meals. Patient to return to clinic if there is worsening or persistence of symptoms. Patient verbalized understanding.     Patient to come back in 7 - 10 days if needed for worsening symptoms.

## 2023-12-26 ENCOUNTER — OFFICE VISIT (OUTPATIENT)
Dept: PRIMARY CARE | Facility: CLINIC | Age: 28
End: 2023-12-26
Payer: COMMERCIAL

## 2023-12-26 VITALS
HEIGHT: 64 IN | DIASTOLIC BLOOD PRESSURE: 88 MMHG | TEMPERATURE: 98.8 F | RESPIRATION RATE: 15 BRPM | SYSTOLIC BLOOD PRESSURE: 120 MMHG | BODY MASS INDEX: 21.78 KG/M2 | HEART RATE: 93 BPM | OXYGEN SATURATION: 99 % | WEIGHT: 127.6 LBS

## 2023-12-26 DIAGNOSIS — I78.0 OSLER HEMORRHAGIC TELANGIECTASIA SYNDROME (CMS-HCC): ICD-10-CM

## 2023-12-26 DIAGNOSIS — R10.84 GENERALIZED ABDOMINAL PAIN: ICD-10-CM

## 2023-12-26 DIAGNOSIS — F31.9 BIPOLAR 1 DISORDER (MULTI): ICD-10-CM

## 2023-12-26 DIAGNOSIS — G89.4 CHRONIC PAIN SYNDROME: ICD-10-CM

## 2023-12-26 DIAGNOSIS — F41.1 GENERALIZED ANXIETY DISORDER: ICD-10-CM

## 2023-12-26 PROCEDURE — 99214 OFFICE O/P EST MOD 30 MIN: CPT | Performed by: FAMILY MEDICINE

## 2023-12-26 ASSESSMENT — ENCOUNTER SYMPTOMS
COUGH: 0
SINUS PAIN: 0
STRIDOR: 0
APPETITE CHANGE: 0
SEIZURES: 0
TROUBLE SWALLOWING: 0
ADENOPATHY: 0
HEMATURIA: 0
SPEECH DIFFICULTY: 0
BLOOD IN STOOL: 0
FATIGUE: 0
HEADACHES: 0
DIZZINESS: 0
NERVOUS/ANXIOUS: 0
SLEEP DISTURBANCE: 0
FEVER: 0
DYSURIA: 0
SHORTNESS OF BREATH: 0
POLYDIPSIA: 0
CONSTIPATION: 0
PALPITATIONS: 0
SINUS PRESSURE: 0
ABDOMINAL PAIN: 1
CHEST TIGHTNESS: 0
PHOTOPHOBIA: 0
DIARRHEA: 0
NECK STIFFNESS: 0
CONFUSION: 0
FLANK PAIN: 0
POLYPHAGIA: 0
RHINORRHEA: 0
RECTAL PAIN: 0
ACTIVITY CHANGE: 0
ARTHRALGIAS: 0
CONSTITUTIONAL NEGATIVE: 1
MYALGIAS: 0
DYSPHORIC MOOD: 0
DECREASED CONCENTRATION: 0
SORE THROAT: 0
AGITATION: 0
EYE PAIN: 0
COLOR CHANGE: 0
ABDOMINAL DISTENTION: 0

## 2023-12-26 NOTE — PROGRESS NOTES
Subjective   Patient ID: Pretty Devine is a 28 y.o. female who presents for Abdominal Pain.    Patient is her with abdominal pain.    Patient states trying heat pad and laying down for relief but it just help a little bit.    Abdominal Pain  This is a recurrent problem. The current episode started 1 to 4 weeks ago. The onset quality is undetermined. The problem occurs constantly. The problem has been gradually worsening. The pain is located in the RUQ. The pain is at a severity of 7/10. The pain is severe. The quality of the pain is sharp. The abdominal pain does not radiate. Pertinent negatives include no arthralgias, constipation, diarrhea, dysuria, fever, headaches, hematuria or myalgias. The pain is aggravated by movement. She has tried acetaminophen for the symptoms. The treatment provided no relief.        Review of Systems   Constitutional: Negative.  Negative for activity change, appetite change, fatigue and fever.   HENT:  Negative for congestion, dental problem, ear discharge, ear pain, mouth sores, rhinorrhea, sinus pressure, sinus pain, sore throat, tinnitus and trouble swallowing.    Eyes:  Negative for photophobia, pain and visual disturbance.   Respiratory:  Negative for cough, chest tightness, shortness of breath and stridor.    Cardiovascular:  Negative for chest pain and palpitations.   Gastrointestinal:  Positive for abdominal pain. Negative for abdominal distention, blood in stool, constipation, diarrhea and rectal pain.   Endocrine: Negative for cold intolerance, heat intolerance, polydipsia, polyphagia and polyuria.   Genitourinary:  Negative for dysuria, flank pain, hematuria and urgency.   Musculoskeletal:  Negative for arthralgias, gait problem, myalgias and neck stiffness.   Skin:  Negative for color change and rash.   Allergic/Immunologic: Negative for environmental allergies and food allergies.   Neurological:  Negative for dizziness, seizures, syncope, speech difficulty and headaches.  "  Hematological:  Negative for adenopathy.   Psychiatric/Behavioral:  Negative for agitation, confusion, decreased concentration, dysphoric mood and sleep disturbance. The patient is not nervous/anxious.      Objective   /88 (BP Location: Right arm, Patient Position: Sitting, BP Cuff Size: Adult)   Pulse 93   Temp 37.1 °C (98.8 °F)   Resp 15   Ht 1.626 m (5' 4\")   Wt 57.9 kg (127 lb 9.6 oz)   SpO2 99%   BMI 21.90 kg/m²     Physical Exam  Constitutional:       Appearance: Normal appearance.   HENT:      Head: Normocephalic and atraumatic.      Right Ear: Tympanic membrane, ear canal and external ear normal.      Left Ear: Tympanic membrane, ear canal and external ear normal.      Nose: Nose normal.      Mouth/Throat:      Mouth: Mucous membranes are moist.      Pharynx: Oropharynx is clear.   Eyes:      Extraocular Movements: Extraocular movements intact.      Conjunctiva/sclera: Conjunctivae normal.      Pupils: Pupils are equal, round, and reactive to light.   Cardiovascular:      Rate and Rhythm: Normal rate and regular rhythm.      Pulses: Normal pulses.      Heart sounds: Normal heart sounds.   Pulmonary:      Effort: Pulmonary effort is normal.      Breath sounds: Normal breath sounds.   Abdominal:      General: Abdomen is flat. Bowel sounds are normal.      Palpations: Abdomen is soft.   Musculoskeletal:         General: Normal range of motion.      Cervical back: Normal range of motion and neck supple.   Skin:     General: Skin is warm and dry.   Neurological:      General: No focal deficit present.      Mental Status: She is alert and oriented to person, place, and time.   Psychiatric:         Mood and Affect: Mood normal.         Behavior: Behavior normal.         Thought Content: Thought content normal.         Judgment: Judgment normal.         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    Relevant Orders    Follow Up In [facility] - PCP - Established    Referral to Gastroenterology    Abdominal pain R10.9    Relevant Orders    Follow Up In [facility] - PCP - Established    Referral to Gastroenterology    Bipolar 1 disorder (CMS/[facility]) F31.9    Relevant Orders    Follow Up In [facility] - PCP - Established    Follow Up In Advanced [facility] - Behavioral Health Collaborative Care [facility]     I have personally reviewed the [facility] report with the patient and have considered the risk of abuse, addiction, dependence and diversion.    Patient's use of medication is allowing patient to be able to perform  ADL's. Patient is always being evaluated for the possibility of lowering the medication dosage.     No narcotics or controlled meds    On suboxone at a clinic     Cont pregabalin    continue all current medications and therapy for chronic medical conditions     Referred to Gastro.     Sees [facility] physic for [facility]    Referred to [facility] services. See ROSY Middleton     I have discussed the Collaborative Care Model for this patient's behavioral health care. Written detailed information identifying the members of this care team was provided to patient. The patient gives permission for the behavioral health manager ([facility]) and psychiatric consultant to be included in their care with my continued primary management. Patient made aware that services provided as part of the Collaborative Care Model are subject to insurance billing      Patient has given verbal permission to speak to her mother     Did not see Dr. Kemar Avendaño Attestation  By signing my name below, Daniele LUND  STEVE Epstein   , Scribe   attest that this documentation has been prepared under the direction and in the presence of Eric Aguilera MD.     Provider Attestation - Scribe documentation    All medical record entries made by the Scribe were at my direction and personally dictated by me. I have reviewed the chart and agree that the record accurately reflects my personal performance of the history, physical exam, discussion and plan.     Off work 12/18-12/28      RTW 12/29

## 2023-12-27 ENCOUNTER — APPOINTMENT (OUTPATIENT)
Dept: PRIMARY CARE | Facility: CLINIC | Age: 28
End: 2023-12-27
Payer: COMMERCIAL

## 2024-01-02 ENCOUNTER — TELEMEDICINE (OUTPATIENT)
Dept: PRIMARY CARE | Facility: CLINIC | Age: 29
End: 2024-01-02
Payer: COMMERCIAL

## 2024-01-02 DIAGNOSIS — M32.9 LUPUS (MULTI): ICD-10-CM

## 2024-01-02 DIAGNOSIS — D69.6 THROMBOCYTOPENIA, UNSPECIFIED (CMS-HCC): ICD-10-CM

## 2024-01-02 DIAGNOSIS — G40.909 NONINTRACTABLE EPILEPSY WITHOUT STATUS EPILEPTICUS, UNSPECIFIED EPILEPSY TYPE (MULTI): ICD-10-CM

## 2024-01-02 DIAGNOSIS — E11.9 CONTROLLED TYPE 2 DIABETES MELLITUS WITHOUT COMPLICATION, WITHOUT LONG-TERM CURRENT USE OF INSULIN (MULTI): ICD-10-CM

## 2024-01-02 DIAGNOSIS — E44.0 MALNUTRITION OF MODERATE DEGREE (MULTI): ICD-10-CM

## 2024-01-02 DIAGNOSIS — F31.9 BIPOLAR 1 DISORDER (MULTI): ICD-10-CM

## 2024-01-02 DIAGNOSIS — D61.818 OTHER PANCYTOPENIA (MULTI): ICD-10-CM

## 2024-01-02 DIAGNOSIS — G89.4 CHRONIC PAIN SYNDROME: ICD-10-CM

## 2024-01-02 DIAGNOSIS — E22.2 SYNDROME OF INAPPROPRIATE SECRETION OF ANTIDIURETIC HORMONE (MULTI): ICD-10-CM

## 2024-01-02 DIAGNOSIS — F11.21 OPIOID DEPENDENCE IN REMISSION (MULTI): ICD-10-CM

## 2024-01-02 DIAGNOSIS — K73.9 CHRONIC HEPATITIS, UNSPECIFIED (MULTI): ICD-10-CM

## 2024-01-02 DIAGNOSIS — I78.0 OSLER HEMORRHAGIC TELANGIECTASIA SYNDROME (CMS-HCC): ICD-10-CM

## 2024-01-02 DIAGNOSIS — G82.20 PARAPARESIS (MULTI): Primary | ICD-10-CM

## 2024-01-02 DIAGNOSIS — F41.1 GENERALIZED ANXIETY DISORDER: ICD-10-CM

## 2024-01-02 DIAGNOSIS — R10.84 GENERALIZED ABDOMINAL PAIN: ICD-10-CM

## 2024-01-02 PROCEDURE — 99214 OFFICE O/P EST MOD 30 MIN: CPT | Performed by: FAMILY MEDICINE

## 2024-01-02 ASSESSMENT — ENCOUNTER SYMPTOMS
PANIC: 1
RESTLESSNESS: 1

## 2024-01-02 NOTE — PROGRESS NOTES
Subjective   Patient ID: Pretty Devine is a 28 y.o. female who presents for Anxiety.    Pt is requesting a note for work where it states PCP prescribes Opioids.         Anxiety  Presents for follow-up visit. Symptoms include panic and restlessness. Symptoms occur most days.            Review of Systems   12 Systems have been reviewed as follows.  Constitutional: Fever, weight gain, weight loss, appetite change, night sweats, fatigue, chills.  Eyes : blurry, double vision, vision, loss, tearing, redness, pain, sensitivity to light, glaucoma.  Ears, nose, mouth, and throat: Hearing loss, ringing in the ears, ear pain, nasal congestion, nasal drainage, nosebleeds, mouth, throat, irritation tooth problem.  Cardiovascular :chest pain, pressure, heart racing, palpitations, sweating, leg swelling, high or low blood pressure  Pulmonary: Cough, yellow or green sputum, blood and sputum, shortness of breath, wheezing  Gastrointestinal: Nausea, vomiting, diarrhea, constipation, pain, blood in stool, or vomitus, heartburn, difficulty swallowing  Genitourinary: incontinence, abnormal bleeding, abnormal discharge, urinary frequency, urinary hesitancy, pain, impotence sexual problem, infection, urinary retention  Musculoskeletal: Pain, stiffness, joint, redness or warmth, arthritis, back pain, weakness, muscle wasting, sprain or fracture  Neuro: Weight weakness, dizziness, change in voice, change in taste change in vision, change in hearing, loss, or change of sensation, trouble walking, balance problems coordination problems, shaking, speech problem  Endocrine , cold or heat intolerance, blood sugar problem, weight gain or loss missed periods hot flashes, sweats, change in body hair, change in libido, increased thirst, increased urination  Heme/lymph: Swelling, bleeding, problem anemia, bruising, enlarged lymph nodes  Allergic/immunologic: H. plus nasal drip, watery itchy eyes, nasal drainage, immunosuppressed  The above were  reviewed and noted negative except as noted in HPI and Problem List.    Objective   There were no vitals taken for this visit.    Physical Exam  Constitutional: Well developed, well nourished, alert and in no acute distress     Assessment/Plan   Problem List Items Addressed This Visit             ICD-10-CM    Anxiety disorder F41.9    Relevant Orders    Follow Up In [facility] Care - PCP - Established    Epilepsy (CMS/[facility]) G40.909    Relevant Orders    Follow Up In Advanced [facility] - PCP - Established    Chronic pain G89.29    Relevant Orders    Follow Up In Advanced [facility] - PCP - Established    Lupus (CMS/[facility]) M32.9    Relevant Orders    Follow Up In Advanced [facility] Care - PCP - Established    Osler hemorrhagic telangiectasia syndrome (CMS/[facility]) I78.0    Relevant Orders    Follow Up In Advanced [facility] - PCP - Established    Abdominal pain R10.9    Relevant Orders    Follow Up In Advanced [facility] - PCP - Established    Bipolar 1 disorder (CMS/[facility]) F31.9    Relevant Orders    Follow Up In [facility] - PCP - Established    Other pancytopenia (CMS/[facility]) D61.818    Relevant Orders    Follow Up In [facility] - PCP - Established    Controlled type 2 diabetes mellitus without complication, without long-term current use of insulin (CMS/[facility]) E11.9    Relevant Orders    Follow Up In [facility] - PCP - Established    Malnutrition of moderate degree (CMS/[facility]) E44.0    Relevant Orders    Follow Up In Advanced [facility] - PCP - Established    Paraparesis (CMS/[facility]) - Primary G82.20    Relevant Orders    Follow Up In [facility] - PCP - Established    Opioid dependence in remission (CMS/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 She was last prescribed this medication on Nov 13, 2023. She was giving a two weeks supply at that point in time.        Continue current medications and therapy for chronic medical conditions    Virtual Visit - Audio and Visual Communication Real Time   No narcotics or controlled meds     On suboxone at a clinic     Cont pregabalin     continue all current medications and therapy for chronic medical conditions      Referred to Gastro.      Sees [facility] physic for [facility]     Referred to [facility] services. See ROSY Middleton      I have discussed the Collaborative Care Model for this patient's behavioral health care. Written detailed information identifying the members of this care team was provided to patient. The patient gives permission for the behavioral health manager (B[facility]) and psychiatric consultant to be included in their care with my continued primary management. Patient made aware that services provided as part of the Collaborative Care Model are subject to insurance billing      Patient has given verbal permission to speak to her mother     Did not see Dr. Reinoso      "

## 2024-01-10 ENCOUNTER — OFFICE VISIT (OUTPATIENT)
Dept: HEMATOLOGY/ONCOLOGY | Facility: CLINIC | Age: 29
End: 2024-01-10
Payer: COMMERCIAL

## 2024-01-10 ENCOUNTER — LAB (OUTPATIENT)
Dept: LAB | Facility: CLINIC | Age: 29
End: 2024-01-10
Payer: COMMERCIAL

## 2024-01-10 ENCOUNTER — TELEPHONE (OUTPATIENT)
Dept: HEMATOLOGY/ONCOLOGY | Facility: CLINIC | Age: 29
End: 2024-01-10
Payer: COMMERCIAL

## 2024-01-10 VITALS
OXYGEN SATURATION: 98 % | RESPIRATION RATE: 16 BRPM | BODY MASS INDEX: 22.67 KG/M2 | DIASTOLIC BLOOD PRESSURE: 72 MMHG | WEIGHT: 132.06 LBS | TEMPERATURE: 96.8 F | SYSTOLIC BLOOD PRESSURE: 107 MMHG | HEART RATE: 74 BPM

## 2024-01-10 DIAGNOSIS — T80.212S PORT OR RESERVOIR INFECTION, SEQUELA: ICD-10-CM

## 2024-01-10 DIAGNOSIS — F31.9 BIPOLAR 1 DISORDER (MULTI): ICD-10-CM

## 2024-01-10 DIAGNOSIS — R56.9 SEIZURES (MULTI): ICD-10-CM

## 2024-01-10 DIAGNOSIS — G90.A POTS (POSTURAL ORTHOSTATIC TACHYCARDIA SYNDROME): ICD-10-CM

## 2024-01-10 DIAGNOSIS — D50.0 IRON DEFICIENCY ANEMIA DUE TO CHRONIC BLOOD LOSS: ICD-10-CM

## 2024-01-10 DIAGNOSIS — I42.9 CARDIOMYOPATHY, UNSPECIFIED TYPE (MULTI): ICD-10-CM

## 2024-01-10 DIAGNOSIS — L40.4 PSORIASIS, GUTTATE: ICD-10-CM

## 2024-01-10 DIAGNOSIS — M84.353S STRESS FRACTURE OF NECK OF FEMUR, SEQUELA: ICD-10-CM

## 2024-01-10 DIAGNOSIS — G43.111 INTRACTABLE MIGRAINE WITH AURA WITH STATUS MIGRAINOSUS: ICD-10-CM

## 2024-01-10 DIAGNOSIS — I78.0: ICD-10-CM

## 2024-01-10 DIAGNOSIS — I78.0: Primary | ICD-10-CM

## 2024-01-10 DIAGNOSIS — T45.4X5S ADVERSE EFFECT OF IRON AND ITS COMPOUNDS, SEQUELA: ICD-10-CM

## 2024-01-10 LAB
ALBUMIN SERPL BCP-MCNC: 4.4 G/DL (ref 3.4–5)
ALP SERPL-CCNC: 99 U/L (ref 33–110)
ALT SERPL W P-5'-P-CCNC: 38 U/L (ref 7–45)
ANION GAP SERPL CALC-SCNC: 11 MMOL/L (ref 10–20)
AST SERPL W P-5'-P-CCNC: 33 U/L (ref 9–39)
BASOPHILS # BLD AUTO: 0.01 X10*3/UL (ref 0–0.1)
BASOPHILS NFR BLD AUTO: 0.3 %
BILIRUB SERPL-MCNC: 0.4 MG/DL (ref 0–1.2)
BUN SERPL-MCNC: 10 MG/DL (ref 6–23)
CALCIUM SERPL-MCNC: 9.6 MG/DL (ref 8.6–10.3)
CHLORIDE SERPL-SCNC: 100 MMOL/L (ref 98–107)
CO2 SERPL-SCNC: 31 MMOL/L (ref 21–32)
CREAT SERPL-MCNC: 0.72 MG/DL (ref 0.5–1.05)
EGFRCR SERPLBLD CKD-EPI 2021: >90 ML/MIN/1.73M*2
EOSINOPHIL # BLD AUTO: 0.04 X10*3/UL (ref 0–0.7)
EOSINOPHIL NFR BLD AUTO: 1.3 %
ERYTHROCYTE [DISTWIDTH] IN BLOOD BY AUTOMATED COUNT: 20.7 % (ref 11.5–14.5)
FERRITIN SERPL-MCNC: 9 NG/ML (ref 8–150)
GLUCOSE SERPL-MCNC: 80 MG/DL (ref 74–99)
HCT VFR BLD AUTO: 34.3 % (ref 36–46)
HGB BLD-MCNC: 10.8 G/DL (ref 12–16)
HYPOCHROMIA BLD QL SMEAR: NORMAL
IMM GRANULOCYTES # BLD AUTO: 0 X10*3/UL (ref 0–0.7)
IMM GRANULOCYTES NFR BLD AUTO: 0 % (ref 0–0.9)
IRON SATN MFR SERPL: ABNORMAL %
IRON SERPL-MCNC: 33 UG/DL (ref 35–150)
LYMPHOCYTES # BLD AUTO: 1.01 X10*3/UL (ref 1.2–4.8)
LYMPHOCYTES NFR BLD AUTO: 32.4 %
MCH RBC QN AUTO: 24.3 PG (ref 26–34)
MCHC RBC AUTO-ENTMCNC: 31.5 G/DL (ref 32–36)
MCV RBC AUTO: 77 FL (ref 80–100)
MONOCYTES # BLD AUTO: 0.4 X10*3/UL (ref 0.1–1)
MONOCYTES NFR BLD AUTO: 12.8 %
NEUTROPHILS # BLD AUTO: 1.66 X10*3/UL (ref 1.2–7.7)
NEUTROPHILS NFR BLD AUTO: 53.2 %
NRBC BLD-RTO: ABNORMAL /100{WBCS}
OVALOCYTES BLD QL SMEAR: NORMAL
PLATELET # BLD AUTO: 266 X10*3/UL (ref 150–450)
POLYCHROMASIA BLD QL SMEAR: NORMAL
POTASSIUM SERPL-SCNC: 4 MMOL/L (ref 3.5–5.3)
PROT SERPL-MCNC: 7.2 G/DL (ref 6.4–8.2)
RBC # BLD AUTO: 4.45 X10*6/UL (ref 4–5.2)
RBC MORPH BLD: NORMAL
SODIUM SERPL-SCNC: 138 MMOL/L (ref 136–145)
STOMATOCYTES BLD QL SMEAR: NORMAL
TIBC SERPL-MCNC: ABNORMAL UG/DL
UIBC SERPL-MCNC: >450 UG/DL (ref 110–370)
WBC # BLD AUTO: 3.1 X10*3/UL (ref 4.4–11.3)

## 2024-01-10 PROCEDURE — 3078F DIAST BP <80 MM HG: CPT | Performed by: INTERNAL MEDICINE

## 2024-01-10 PROCEDURE — 85025 COMPLETE CBC W/AUTO DIFF WBC: CPT

## 2024-01-10 PROCEDURE — 83540 ASSAY OF IRON: CPT

## 2024-01-10 PROCEDURE — 3074F SYST BP LT 130 MM HG: CPT | Performed by: INTERNAL MEDICINE

## 2024-01-10 PROCEDURE — 82728 ASSAY OF FERRITIN: CPT

## 2024-01-10 PROCEDURE — 99214 OFFICE O/P EST MOD 30 MIN: CPT | Performed by: INTERNAL MEDICINE

## 2024-01-10 PROCEDURE — 3008F BODY MASS INDEX DOCD: CPT | Performed by: INTERNAL MEDICINE

## 2024-01-10 PROCEDURE — 80053 COMPREHEN METABOLIC PANEL: CPT

## 2024-01-10 RX ORDER — EPINEPHRINE 0.3 MG/.3ML
0.3 INJECTION SUBCUTANEOUS EVERY 5 MIN PRN
Status: CANCELLED | OUTPATIENT
Start: 2024-01-16

## 2024-01-10 RX ORDER — HEPARIN SODIUM,PORCINE/PF 10 UNIT/ML
50 SYRINGE (ML) INTRAVENOUS AS NEEDED
Status: CANCELLED | OUTPATIENT
Start: 2024-01-10

## 2024-01-10 RX ORDER — HEPARIN 100 UNIT/ML
500 SYRINGE INTRAVENOUS AS NEEDED
Status: CANCELLED | OUTPATIENT
Start: 2024-01-10

## 2024-01-10 RX ORDER — DIPHENHYDRAMINE HYDROCHLORIDE 50 MG/ML
50 INJECTION INTRAMUSCULAR; INTRAVENOUS AS NEEDED
Status: CANCELLED | OUTPATIENT
Start: 2024-01-16

## 2024-01-10 RX ORDER — ALBUTEROL SULFATE 0.83 MG/ML
3 SOLUTION RESPIRATORY (INHALATION) AS NEEDED
Status: CANCELLED | OUTPATIENT
Start: 2024-01-16

## 2024-01-10 RX ORDER — FAMOTIDINE 10 MG/ML
20 INJECTION INTRAVENOUS ONCE AS NEEDED
Status: CANCELLED | OUTPATIENT
Start: 2024-01-16

## 2024-01-10 ASSESSMENT — PAIN SCALES - GENERAL: PAINLEVEL: 0-NO PAIN

## 2024-01-10 NOTE — PATIENT INSTRUCTIONS
We will continue to follow your blood work and iron levels    I have put in order for port removal

## 2024-01-10 NOTE — PROGRESS NOTES
Patient ID: Pretty Devine is a 28 y.o. female.  Referring Physician: No referring provider defined for this encounter.  Primary Care Provider: Eric Aguilera MD  Visit Type: Follow Up      Subjective    HPI I think my port is infected and it needs to come out    Review of Systems   Constitutional:  Positive for fatigue and fever.   HENT:  Negative.     Eyes: Negative.    Respiratory: Negative.     Cardiovascular: Negative.    Gastrointestinal: Negative.    Endocrine: Negative.    Genitourinary: Negative.     Musculoskeletal: Negative.    Neurological: Negative.    Hematological: Negative.    Psychiatric/Behavioral: Negative.          Objective   BSA: 1.64 meters squared  /72 (BP Location: Right arm)   Pulse 74   Temp 36 °C (96.8 °F) (Temporal)   Resp 16   Wt 59.9 kg (132 lb 0.9 oz)   SpO2 98%   BMI 22.67 kg/m²      has a past medical history of Acute upper respiratory infection, unspecified, Disruption of external operation (surgical) wound, not elsewhere classified, initial encounter (09/04/2019), Effusion, left hip (05/18/2015), Encounter for general adult medical examination without abnormal findings (08/19/2020), Encounter for other preprocedural examination, Encounter for other preprocedural examination, Fever presenting with conditions classified elsewhere (09/10/2018), Fracture of unspecified part of neck of unspecified femur, initial encounter for closed fracture (CMS/[facility]) (05/06/2015), Jaw pain (03/03/2021), Other conditions influencing health status (10/29/2019), Other specified postprocedural states, Other symptoms and signs involving the musculoskeletal system (01/10/2017), Pain in unspecified ankle and joints of unspecified foot (01/11/2022), Pain in unspecified hip (04/09/2019), Pelvic and perineal pain (05/18/2015), Personal history of diseases of the blood and blood-forming organs and certain disorders involving the immune mechanism (04/02/2021), Personal history of diseases of the  blood and blood-forming organs and certain disorders involving the immune mechanism (01/12/2022), Personal history of diseases of the skin and subcutaneous tissue (01/13/2022), Personal history of other diseases of the circulatory system (04/25/2019), Personal history of other diseases of the digestive system (07/16/2020), Personal history of other diseases of the nervous system and sense organs (09/11/2019), Personal history of other diseases of urinary system (11/12/2019), Personal history of other drug therapy, Personal history of other drug therapy, Personal history of other endocrine, nutritional and metabolic disease (05/11/2016), Personal history of other endocrine, nutritional and metabolic disease (08/21/2020), Personal history of other infectious and parasitic diseases, Personal history of other mental and behavioral disorders (06/03/2021), Personal history of other specified conditions (08/21/2020), Personal history of other specified conditions (09/06/2017), Personal history of other specified conditions (05/24/2020), Personal history of other specified conditions (07/13/2020), Personal history of other venous thrombosis and embolism (12/29/2022), Personal history of pulmonary embolism (03/29/2021), Shortness of breath (09/10/2018), and Trochanteric bursitis, unspecified hip (08/04/2015).   has a past surgical history that includes Other surgical history (04/06/2015); Other surgical history (01/13/2022); Other surgical history (09/24/2018); Other surgical history (09/24/2018); Other surgical history (09/24/2018); CT angio head w and wo IV contrast (4/11/2019); IR intervention venous sclerosis (4/22/2019); CT angio abdomen pelvis w and or wo IV IV contrast (9/12/2019); CT angio abdomen pelvis w and or wo IV IV contrast (10/22/2019); MR angio chest w and wo IV contrast (2/5/2020); IR intervention venous embolization (2/19/2020); CT angio head w and wo IV contrast (9/26/2020); MR angio head wo IV contrast  (12/10/2020); CT angio abdomen pelvis w and or wo IV IV contrast (4/3/2022); CT angio head w and wo IV contrast (9/14/2022); and CT angio neck (9/14/2022).  Family History   Problem Relation Name Age of Onset    Colon cancer Mother      Other (gene mutation) Mother      Other (AVM) Father      Other (cerebrovascular accident  [Other]) Father      Other (Cardiac disorder [Other]) Father      Breast cancer Mother's Sister      Other (gene mutation) Mother's Sister      Breast cancer Father's Sister      Leukemia Father's Brother      Skin cancer Other Family History     Breast cancer Maternal Cousin      Other (gene mutation) Maternal Cousin       Oncology History    No history exists.       Pretty Devine  has no history on file for tobacco use.  She  reports current alcohol use of about 1.0 standard drink of alcohol per week.  She  reports no history of drug use.    Physical Exam  Vitals reviewed.   Constitutional:       Appearance: Normal appearance.   HENT:      Head: Normocephalic.   Eyes:      Extraocular Movements: Extraocular movements intact.      Pupils: Pupils are equal, round, and reactive to light.   Cardiovascular:      Rate and Rhythm: Normal rate and regular rhythm.      Heart sounds: Normal heart sounds.      Comments: Chest wall portacath  Pulmonary:      Breath sounds: Normal breath sounds.   Abdominal:      General: Bowel sounds are normal.      Palpations: Abdomen is soft.   Musculoskeletal:         General: Normal range of motion.      Cervical back: Normal range of motion and neck supple.   Skin:     General: Skin is warm.   Neurological:      General: No focal deficit present.      Mental Status: She is alert and oriented to person, place, and time.   Psychiatric:         Mood and Affect: Mood normal.         Behavior: Behavior normal.         WBC   Date/Time Value Ref Range Status   10/28/2023 02:20 PM 3.5 (L) 4.4 - 11.3 x10*3/uL Final   10/16/2023 01:10 AM 3.0 (L) 4.4 - 11.3 x10*3/uL Final    07/11/2023 01:07 PM 4.7 4.4 - 11.3 x10E9/L Final   07/11/2023 04:07 AM 4.6 4.4 - 11.3 x10E9/L Final   07/07/2023 03:26 PM 3.5 (L) 4.4 - 11.3 x10E9/L Final     nRBC   Date Value Ref Range Status   10/28/2023 0.0 0.0 - 0.0 /100 WBCs Final   10/16/2023 0.0 0.0 - 0.0 /100 WBCs Final   07/11/2023 0.0 0.0 - 0.0 /100 WBC Final   07/11/2023 0.0 0.0 - 0.0 /100 WBC Final   06/28/2023 0.0 0.0 - 0.0 /100 WBC Final     RBC   Date Value Ref Range Status   10/28/2023 4.65 4.00 - 5.20 x10*6/uL Final   10/16/2023 4.23 4.00 - 5.20 x10*6/uL Final   07/11/2023 4.58 4.00 - 5.20 x10E12/L Final   07/11/2023 4.39 4.00 - 5.20 x10E12/L Final   07/07/2023 4.04 4.00 - 5.20 x10E12/L Final     Hemoglobin   Date Value Ref Range Status   10/28/2023 10.7 (L) 12.0 - 16.0 g/dL Final   10/16/2023 9.2 (L) 12.0 - 16.0 g/dL Final   10/16/2023 9.8 (L) 12.0 - 16.0 g/dL Final     Hematocrit   Date Value Ref Range Status   10/28/2023 35.2 (L) 36.0 - 46.0 % Final   10/16/2023 29.9 (L) 36.0 - 46.0 % Final   10/16/2023 32.1 (L) 36.0 - 46.0 % Final     MCV   Date/Time Value Ref Range Status   10/28/2023 02:20 PM 76 (L) 80 - 100 fL Final   10/16/2023 01:10 AM 76 (L) 80 - 100 fL Final   07/11/2023 01:07 PM 86 80 - 100 fL Final   07/11/2023 04:07 AM 85 80 - 100 fL Final   07/07/2023 03:26 PM 86 80 - 100 fL Final     MCH   Date/Time Value Ref Range Status   10/28/2023 02:20 PM 23.0 (L) 26.0 - 34.0 pg Final   10/16/2023 01:10 AM 23.2 (L) 26.0 - 34.0 pg Final     MCHC   Date/Time Value Ref Range Status   10/28/2023 02:20 PM 30.4 (L) 32.0 - 36.0 g/dL Final   10/16/2023 01:10 AM 30.5 (L) 32.0 - 36.0 g/dL Final   07/11/2023 01:07 PM 33.3 32.0 - 36.0 g/dL Final   07/11/2023 04:07 AM 33.6 32.0 - 36.0 g/dL Final   07/07/2023 03:26 PM 35.0 32.0 - 36.0 g/dL Final     RDW   Date/Time Value Ref Range Status   10/28/2023 02:20 PM 18.3 (H) 11.5 - 14.5 % Final   10/16/2023 01:10 AM 17.1 (H) 11.5 - 14.5 % Final   07/11/2023 01:07 PM 15.9 (H) 11.5 - 14.5 % Final   07/11/2023 04:07  AM 15.9 (H) 11.5 - 14.5 % Final   07/07/2023 03:26 PM 15.0 (H) 11.5 - 14.5 % Final     Platelets   Date/Time Value Ref Range Status   10/28/2023 02:20  150 - 450 x10*3/uL Final   10/16/2023 01:10  150 - 450 x10*3/uL Final   07/11/2023 01:07  150 - 450 x10E9/L Final   07/11/2023 04:07  150 - 450 x10E9/L Final   07/07/2023 03:26  150 - 450 x10E9/L Final     MPV   Date/Time Value Ref Range Status   10/28/2023 02:20 PM 9.4 7.5 - 11.5 fL Final   10/16/2023 01:10 AM 9.9 7.5 - 11.5 fL Final     Neutrophils %   Date/Time Value Ref Range Status   10/28/2023 02:20 PM 61.5 40.0 - 80.0 % Final   10/16/2023 01:10 AM 61.2 40.0 - 80.0 % Final   07/11/2023 01:07 PM 71.3 40.0 - 80.0 % Final   07/11/2023 04:07 AM 71.9 40.0 - 80.0 % Final   07/07/2023 03:26 PM 58.6 40.0 - 80.0 % Final     Immature Granulocytes %, Automated   Date/Time Value Ref Range Status   10/28/2023 02:20 PM 0.3 0.0 - 0.9 % Final     Comment:     Immature Granulocyte Count (IG) includes promyelocytes, myelocytes and metamyelocytes but does not include bands. Percent differential counts (%) should be interpreted in the context of the absolute cell counts (cells/UL).   10/16/2023 01:10 AM 0.3 0.0 - 0.9 % Final     Comment:     Immature Granulocyte Count (IG) includes promyelocytes, myelocytes and metamyelocytes but does not include bands. Percent differential counts (%) should be interpreted in the context of the absolute cell counts (cells/UL).   07/11/2023 01:07 PM 0.2 0.0 - 0.9 % Final     Comment:      Immature Granulocyte Count (IG) includes promyelocytes,    myelocytes and metamyelocytes but does not include bands.   Percent differential counts (%) should be interpreted in the   context of the absolute cell counts (cells/L).     07/11/2023 04:07 AM 0.2 0.0 - 0.9 % Final     Comment:      Immature Granulocyte Count (IG) includes promyelocytes,    myelocytes and metamyelocytes but does not include bands.   Percent differential  counts (%) should be interpreted in the   context of the absolute cell counts (cells/L).     07/07/2023 03:26 PM 0.0 0.0 - 0.9 % Final     Comment:      Immature Granulocyte Count (IG) includes promyelocytes,    myelocytes and metamyelocytes but does not include bands.   Percent differential counts (%) should be interpreted in the   context of the absolute cell counts (cells/L).       Lymphocytes %   Date/Time Value Ref Range Status   10/28/2023 02:20 PM 24.6 13.0 - 44.0 % Final   10/16/2023 01:10 AM 26.0 13.0 - 44.0 % Final   07/11/2023 01:07 PM 18.3 13.0 - 44.0 % Final   07/11/2023 04:07 AM 17.1 13.0 - 44.0 % Final   07/07/2023 03:26 PM 27.9 13.0 - 44.0 % Final     Monocytes %   Date/Time Value Ref Range Status   10/28/2023 02:20 PM 11.6 2.0 - 10.0 % Final   10/16/2023 01:10 AM 11.5 2.0 - 10.0 % Final   07/11/2023 01:07 PM 9.8 2.0 - 10.0 % Final   07/11/2023 04:07 AM 9.9 2.0 - 10.0 % Final   07/07/2023 03:26 PM 11.2 2.0 - 10.0 % Final     Eosinophils %   Date/Time Value Ref Range Status   10/28/2023 02:20 PM 1.7 0.0 - 6.0 % Final   10/16/2023 01:10 AM 0.7 0.0 - 6.0 % Final   07/11/2023 01:07 PM 0.2 0.0 - 6.0 % Final   07/11/2023 04:07 AM 0.7 0.0 - 6.0 % Final   07/07/2023 03:26 PM 2.0 0.0 - 6.0 % Final     Basophils %   Date/Time Value Ref Range Status   10/28/2023 02:20 PM 0.3 0.0 - 2.0 % Final   10/16/2023 01:10 AM 0.3 0.0 - 2.0 % Final   07/11/2023 01:07 PM 0.2 0.0 - 2.0 % Final   07/11/2023 04:07 AM 0.2 0.0 - 2.0 % Final   07/07/2023 03:26 PM 0.3 0.0 - 2.0 % Final     Neutrophils Absolute   Date/Time Value Ref Range Status   10/28/2023 02:20 PM 2.17 1.20 - 7.70 x10*3/uL Final     Comment:     Percent differential counts (%) should be interpreted in the context of the absolute cell counts (cells/uL).   10/16/2023 01:10 AM 1.86 1.20 - 7.70 x10*3/uL Final     Comment:     Percent differential counts (%) should be interpreted in the context of the absolute cell counts (cells/uL).   07/11/2023 01:07 PM 3.34 1.20 -  "7.70 x10E9/L Final   07/11/2023 04:07 AM 3.27 1.20 - 7.70 x10E9/L Final   07/07/2023 03:26 PM 2.04 1.20 - 7.70 x10E9/L Final     Immature Granulocytes Absolute, Automated   Date/Time Value Ref Range Status   10/28/2023 02:20 PM 0.01 0.00 - 0.70 x10*3/uL Final   10/16/2023 01:10 AM 0.01 0.00 - 0.70 x10*3/uL Final     Lymphocytes Absolute   Date/Time Value Ref Range Status   10/28/2023 02:20 PM 0.87 (L) 1.20 - 4.80 x10*3/uL Final   10/16/2023 01:10 AM 0.79 (L) 1.20 - 4.80 x10*3/uL Final   07/11/2023 01:07 PM 0.86 (L) 1.20 - 4.80 x10E9/L Final   07/11/2023 04:07 AM 0.78 (L) 1.20 - 4.80 x10E9/L Final   07/07/2023 03:26 PM 0.97 (L) 1.20 - 4.80 x10E9/L Final     Monocytes Absolute   Date/Time Value Ref Range Status   10/28/2023 02:20 PM 0.41 0.10 - 1.00 x10*3/uL Final   10/16/2023 01:10 AM 0.35 0.10 - 1.00 x10*3/uL Final   07/11/2023 01:07 PM 0.46 0.10 - 1.00 x10E9/L Final   07/11/2023 04:07 AM 0.45 0.10 - 1.00 x10E9/L Final   07/07/2023 03:26 PM 0.39 0.10 - 1.00 x10E9/L Final     Eosinophils Absolute   Date/Time Value Ref Range Status   10/28/2023 02:20 PM 0.06 0.00 - 0.70 x10*3/uL Final   10/16/2023 01:10 AM 0.02 0.00 - 0.70 x10*3/uL Final   07/11/2023 01:07 PM 0.01 0.00 - 0.70 x10E9/L Final   07/11/2023 04:07 AM 0.03 0.00 - 0.70 x10E9/L Final   07/07/2023 03:26 PM 0.07 0.00 - 0.70 x10E9/L Final     Basophils Absolute   Date/Time Value Ref Range Status   10/28/2023 02:20 PM 0.01 0.00 - 0.10 x10*3/uL Final   10/16/2023 01:10 AM 0.01 0.00 - 0.10 x10*3/uL Final   07/11/2023 01:07 PM 0.01 0.00 - 0.10 x10E9/L Final   07/11/2023 04:07 AM 0.01 0.00 - 0.10 x10E9/L Final   07/07/2023 03:26 PM 0.01 0.00 - 0.10 x10E9/L Final       No components found for: \"PT\"  aPTT   Date/Time Value Ref Range Status   06/28/2023 08:37 AM 34 27 - 38 sec Final     Comment:     Note new reference range as of 6/20/2023 at 10:00am.   02/20/2023 01:32 AM 35 26 - 39 sec Final     Comment:       THE APTT IS NO LONGER USED FOR MONITORING     " UNFRACTIONATED HEPARIN THERAPY.    FOR MONITORING HEPARIN THERAPY,     USE THE HEPARIN ASSAY.     11/17/2022 11:05 AM 34 26 - 39 sec Final     Comment:       THE APTT IS NO LONGER USED FOR MONITORING     UNFRACTIONATED HEPARIN THERAPY.    FOR MONITORING HEPARIN THERAPY,     USE THE HEPARIN ASSAY.       Medication Documentation Review Audit       Reviewed by Olivia Mcgrath MA (Medical Assistant) on 01/10/24 at 0954      Medication Order Taking? Sig Documenting Provider Last Dose Status   albuterol 2.5 mg /3 mL (0.083 %) nebulizer solution [id number] Yes Take 3 mL by nebulization every 6 hours if needed for shortness of breath. Historical Provider, MD Taking Active   Botox 100 unit injection [id number] Yes  Historical Provider, MD Taking Active   buprenorphine-naloxone (Suboxone) 8-2 mg SL tablet [id number] Yes Place 2 tablets under the tongue once daily. DISSOLVE UNDER THE TONGUE Historical Provider, MD Taking Active   cholecalciferol (Vitamin D-3) 50 MCG (2000 UT) tablet [id number] Yes Take 1 tablet (2,000 Units) by mouth once daily. Historical Provider, MD Taking Active   citalopram (CeleXA) 20 mg tablet [id number] Yes Take 1 tablet (20 mg) by mouth once daily. Eric Aguilera MD Taking Active   clindamycin (Cleocin T) 1 % lotion [id number] Yes Apply topically 2 times a day. Historical Provider, MD Taking Active   desoximetasone (Topicort) 0.25 % cream [id number] Yes Apply 1 Application topically in the morning and 1 Application before bedtime. Eric Aguilera MD Taking Active   EPINEPHrine 0.3 mg/0.3 mL injection syringe [id number] Yes 1 unit(s) injectable once a day, As Needed Eric Aguilera MD Taking Active   FeroSuL 325 mg (65 mg iron) tablet [id number] Yes Take 1 tablet by mouth every other day. Historical Provider, MD Taking Active   hydrOXYzine HCL (Atarax) 25 mg tablet [id number] Yes Take 1 tablet (25 mg) by mouth 3 times a day as needed for anxiety. Eric Aguilera MD Taking Active   ivabradine (Corlanor) 5 mg  tablet [id number] Yes Take 0.5 tablets (2.5 mg) by mouth 2 times a day. Historical Provider, MD Taking Active   lidocaine (Lidoderm) 5 % patch [id number] Yes Place 2 patches over 12 hours on the skin once daily. Eric Aguilera MD Taking Active   metoprolol succinate XL (Toprol-XL) 25 mg 24 hr tablet [id number] Yes Take 1 tablet (25 mg) by mouth 2 times a day. Eric Aguilera MD Taking Active   metoprolol tartrate (Lopressor) 25 mg tablet [id number] Yes TAKE 1 TABLET BY MOUTH AS NEEDED FOR IF HEART RATE GOES ABOVE 120 Eric Aguilera MD Taking Active   midodrine (Proamatine) 5 mg tablet [id number] Yes Take 1 tablet (5 mg) by mouth 3 times a day. Eric Aguilera MD Taking Active   naloxone (Narcan) 4 mg/0.1 mL nasal spray [id number] Yes Administer 1 spray (4 mg) into affected nostril(s) if needed for opioid reversal. May repeat every 2-3 minutes if needed, alternating nostrils, until medical assistance becomes available. Eric Aguilera MD Taking Active   onabotulinumtoxinA (Botox) 100 unit injection [id number] No PHYSICIAN TO INJECT 155 UNITS UNDER THE SKIN PER MIGRAINE PROTOCOL EVERY 3 MONTHS **FOR OFFICE USE ONLY**   Patient not taking: Reported on 1/10/2024    Giselle Resendez MD Not Taking Active   ondansetron ODT (Zofran-ODT) 8 mg disintegrating tablet [id number] Yes Take 1 tablet (8 mg) by mouth every 8 hours if needed for nausea. Eric Aguilera MD Taking Active   pregabalin (Lyrica) 150 mg capsule [id number] Yes Take 1 capsule (150 mg) by mouth 3 times a day. Eric Aguilera MD Taking Active   risperiDONE (RisperDAL) 4 mg tablet [id number] Yes TAKE 1 TABLET(4 MG) BY MOUTH EVERY DAY Eric Aguilera MD Taking Active   tranexamic acid (Lysteda) 650 mg tablet tablet [id number] No Take 2 tablets (1,300 mg) by mouth twice a day. Historical Provider, MD Not Taking Active   triamcinolone (Kenalog) 0.1 % cream [id number] Yes Apply topically 2 times a day. Apply to affected area 1-2 times daily as needed. 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  -she has tried tamoxifen  -she has tried avastin  -she has iron deficiency anemia secondary to chronic blood loss secondary to HHT, and has received multiple courses of IV feraheme   -today's labs included CBC, COMP, iron panel + ferritin  -results reviewed--wbc 3.1, hgb 10.8, MCV 77, plt 266,000, creatinine 0.72, AST 33, ALT 38, TIBC not calculable, saturation not calculable, ferritin 9  -she is markedly iron deficient and would benefit from another course of IV feraheme  -benefits, risks, potential morbidity related to IV feraheme were reviewed with Pretty and she provided informed consent to proceed  -she will receive IV feraheme 510 mg x 2 doses given 3-8 days apart  -as she also sees her PCP regularly and gets the bulk of her care at [facility], her other providers can recheck CBC + iron indices about a month after completion of course     2) bipolar disorder  -on celexa  -on risperdal     3) psoriasis  -on cleocin  -on topicort     4) left femoral neck stress fracture  -had surgery at [facility] in 2014  -was due to AVM in the bone     5) cardiomyopathy  -on ivabradine  -on metoprolol     6) poor venous access  -her port has been accessed multiple at [facility] and at [facility]  -she thinks it is infected again and is having off and on fevers  -she is requesting removal by IR but without immediate replacement     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  - being managed by neurology at [facility]    8) migraines  -on ubrelvy    9) POTS  -on midodrine     Problem List Items Addressed This Visit             ICD-10-CM    Iron deficiency anemia D50.9     Relevant Orders    Iron and TIBC (Completed)    Ferritin (Completed)    Adverse effect of iron and its compounds, sequela T45.4X5S     Other Visit Diagnoses         Codes    Telangiectasia, hereditary hemorrhagic, of Rendu, Osler and Dickinson (CMS/HCC)    -  Primary I78.0    Relevant Orders    CBC and Auto Differential (Completed)    Comprehensive Metabolic Panel (Completed)    Iron and TIBC (Completed)    Ferritin (Completed)    Port or reservoir infection, sequela     T80.212S    Relevant Orders    IR CVC port removal                 Christian Mast MD

## 2024-01-14 PROBLEM — I42.9 CARDIOMYOPATHY (MULTI): Status: ACTIVE | Noted: 2024-01-14

## 2024-01-14 PROBLEM — T80.212A PORT OR RESERVOIR INFECTION: Status: ACTIVE | Noted: 2024-01-14

## 2024-01-14 PROBLEM — I78.0: Status: ACTIVE | Noted: 2024-01-14

## 2024-01-14 ASSESSMENT — ENCOUNTER SYMPTOMS
RESPIRATORY NEGATIVE: 1
PSYCHIATRIC NEGATIVE: 1
ENDOCRINE NEGATIVE: 1
GASTROINTESTINAL NEGATIVE: 1
FEVER: 1
NEUROLOGICAL NEGATIVE: 1
EYES NEGATIVE: 1
FATIGUE: 1
HEMATOLOGIC/LYMPHATIC NEGATIVE: 1
CARDIOVASCULAR NEGATIVE: 1
MUSCULOSKELETAL NEGATIVE: 1

## 2024-01-16 ENCOUNTER — INFUSION (OUTPATIENT)
Dept: HEMATOLOGY/ONCOLOGY | Facility: CLINIC | Age: 29
End: 2024-01-16
Payer: COMMERCIAL

## 2024-01-16 VITALS
WEIGHT: 136.24 LBS | TEMPERATURE: 98.6 F | DIASTOLIC BLOOD PRESSURE: 58 MMHG | HEART RATE: 69 BPM | RESPIRATION RATE: 16 BRPM | OXYGEN SATURATION: 97 % | SYSTOLIC BLOOD PRESSURE: 88 MMHG | BODY MASS INDEX: 23.39 KG/M2

## 2024-01-16 DIAGNOSIS — D50.0 IRON DEFICIENCY ANEMIA DUE TO CHRONIC BLOOD LOSS: ICD-10-CM

## 2024-01-16 DIAGNOSIS — T45.4X5S ADVERSE EFFECT OF IRON AND ITS COMPOUNDS, SEQUELA: ICD-10-CM

## 2024-01-16 PROCEDURE — 2500000004 HC RX 250 GENERAL PHARMACY W/ HCPCS (ALT 636 FOR OP/ED): Mod: JZ | Performed by: INTERNAL MEDICINE

## 2024-01-16 PROCEDURE — 96365 THER/PROPH/DIAG IV INF INIT: CPT | Mod: INF

## 2024-01-16 RX ORDER — HEPARIN SODIUM,PORCINE/PF 10 UNIT/ML
50 SYRINGE (ML) INTRAVENOUS AS NEEDED
Status: CANCELLED | OUTPATIENT
Start: 2024-01-16

## 2024-01-16 RX ORDER — ALBUTEROL SULFATE 0.83 MG/ML
3 SOLUTION RESPIRATORY (INHALATION) AS NEEDED
Status: CANCELLED | OUTPATIENT
Start: 2024-01-23

## 2024-01-16 RX ORDER — HEPARIN 100 UNIT/ML
500 SYRINGE INTRAVENOUS AS NEEDED
Status: CANCELLED | OUTPATIENT
Start: 2024-01-16

## 2024-01-16 RX ORDER — FAMOTIDINE 10 MG/ML
20 INJECTION INTRAVENOUS ONCE AS NEEDED
Status: CANCELLED | OUTPATIENT
Start: 2024-01-23

## 2024-01-16 RX ORDER — DIPHENHYDRAMINE HYDROCHLORIDE 50 MG/ML
50 INJECTION INTRAMUSCULAR; INTRAVENOUS AS NEEDED
Status: CANCELLED | OUTPATIENT
Start: 2024-01-23

## 2024-01-16 RX ORDER — EPINEPHRINE 0.3 MG/.3ML
0.3 INJECTION SUBCUTANEOUS EVERY 5 MIN PRN
Status: CANCELLED | OUTPATIENT
Start: 2024-01-23

## 2024-01-16 RX ADMIN — FERUMOXYTOL 510 MG: 510 INJECTION INTRAVENOUS at 09:21

## 2024-01-16 ASSESSMENT — PAIN SCALES - GENERAL: PAINLEVEL: 0-NO PAIN

## 2024-01-17 DIAGNOSIS — F41.1 GENERALIZED ANXIETY DISORDER: ICD-10-CM

## 2024-01-18 RX ORDER — RISPERIDONE 4 MG/1
TABLET ORAL
Qty: 30 TABLET | Refills: 1 | Status: SHIPPED | OUTPATIENT
Start: 2024-01-18 | End: 2024-03-05 | Stop reason: SDUPTHER

## 2024-01-22 ENCOUNTER — APPOINTMENT (OUTPATIENT)
Dept: HEMATOLOGY/ONCOLOGY | Facility: CLINIC | Age: 29
End: 2024-01-22
Payer: COMMERCIAL

## 2024-01-23 ENCOUNTER — APPOINTMENT (OUTPATIENT)
Dept: HEMATOLOGY/ONCOLOGY | Facility: CLINIC | Age: 29
End: 2024-01-23
Payer: COMMERCIAL

## 2024-01-24 ENCOUNTER — INFUSION (OUTPATIENT)
Dept: HEMATOLOGY/ONCOLOGY | Facility: CLINIC | Age: 29
End: 2024-01-24
Payer: COMMERCIAL

## 2024-01-24 ENCOUNTER — APPOINTMENT (OUTPATIENT)
Dept: HEMATOLOGY/ONCOLOGY | Facility: CLINIC | Age: 29
End: 2024-01-24
Payer: COMMERCIAL

## 2024-01-24 VITALS
DIASTOLIC BLOOD PRESSURE: 50 MMHG | OXYGEN SATURATION: 97 % | SYSTOLIC BLOOD PRESSURE: 86 MMHG | RESPIRATION RATE: 14 BRPM | BODY MASS INDEX: 23.54 KG/M2 | TEMPERATURE: 98.6 F | HEART RATE: 72 BPM | WEIGHT: 137.13 LBS

## 2024-01-24 DIAGNOSIS — T45.4X5S ADVERSE EFFECT OF IRON AND ITS COMPOUNDS, SEQUELA: ICD-10-CM

## 2024-01-24 DIAGNOSIS — D50.0 IRON DEFICIENCY ANEMIA DUE TO CHRONIC BLOOD LOSS: Primary | ICD-10-CM

## 2024-01-24 DIAGNOSIS — D50.0 IRON DEFICIENCY ANEMIA DUE TO CHRONIC BLOOD LOSS: ICD-10-CM

## 2024-01-24 PROCEDURE — 96365 THER/PROPH/DIAG IV INF INIT: CPT | Mod: INF

## 2024-01-24 PROCEDURE — 2500000004 HC RX 250 GENERAL PHARMACY W/ HCPCS (ALT 636 FOR OP/ED): Performed by: INTERNAL MEDICINE

## 2024-01-24 RX ORDER — DIPHENHYDRAMINE HYDROCHLORIDE 50 MG/ML
50 INJECTION INTRAMUSCULAR; INTRAVENOUS AS NEEDED
OUTPATIENT
Start: 2024-01-30

## 2024-01-24 RX ORDER — FAMOTIDINE 10 MG/ML
20 INJECTION INTRAVENOUS ONCE AS NEEDED
OUTPATIENT
Start: 2024-01-30

## 2024-01-24 RX ORDER — HEPARIN SODIUM,PORCINE/PF 10 UNIT/ML
50 SYRINGE (ML) INTRAVENOUS AS NEEDED
OUTPATIENT
Start: 2024-01-24

## 2024-01-24 RX ORDER — HEPARIN 100 UNIT/ML
500 SYRINGE INTRAVENOUS AS NEEDED
OUTPATIENT
Start: 2024-01-24

## 2024-01-24 RX ORDER — ALBUTEROL SULFATE 0.83 MG/ML
3 SOLUTION RESPIRATORY (INHALATION) AS NEEDED
OUTPATIENT
Start: 2024-01-30

## 2024-01-24 RX ORDER — EPINEPHRINE 0.3 MG/.3ML
0.3 INJECTION SUBCUTANEOUS EVERY 5 MIN PRN
OUTPATIENT
Start: 2024-01-30

## 2024-01-24 RX ADMIN — FERUMOXYTOL 510 MG: 510 INJECTION INTRAVENOUS at 09:00

## 2024-01-24 ASSESSMENT — PAIN SCALES - GENERAL: PAINLEVEL: 0-NO PAIN

## 2024-01-29 ENCOUNTER — HOSPITAL ENCOUNTER (OUTPATIENT)
Dept: RADIOLOGY | Facility: HOSPITAL | Age: 29
Discharge: HOME | End: 2024-01-29
Payer: COMMERCIAL

## 2024-01-29 VITALS
WEIGHT: 140 LBS | HEIGHT: 64 IN | RESPIRATION RATE: 14 BRPM | TEMPERATURE: 98.6 F | BODY MASS INDEX: 23.9 KG/M2 | OXYGEN SATURATION: 100 % | DIASTOLIC BLOOD PRESSURE: 66 MMHG | HEART RATE: 72 BPM | SYSTOLIC BLOOD PRESSURE: 98 MMHG

## 2024-01-29 DIAGNOSIS — T80.212S PORT OR RESERVOIR INFECTION, SEQUELA: ICD-10-CM

## 2024-01-29 LAB
INR PPP: 1 (ref 0.9–1.1)
PROTHROMBIN TIME: 11.6 SECONDS (ref 9.8–12.8)

## 2024-01-29 PROCEDURE — 36590 REMOVAL TUNNELED CV CATH: CPT | Performed by: RADIOLOGY

## 2024-01-29 PROCEDURE — 36415 COLL VENOUS BLD VENIPUNCTURE: CPT | Performed by: RADIOLOGY

## 2024-01-29 PROCEDURE — 7100000001 HC RECOVERY ROOM TIME - INITIAL BASE CHARGE

## 2024-01-29 PROCEDURE — 36590 REMOVAL TUNNELED CV CATH: CPT | Mod: RT

## 2024-01-29 PROCEDURE — 2720000007 HC OR 272 NO HCPCS

## 2024-01-29 PROCEDURE — 77001 FLUOROGUIDE FOR VEIN DEVICE: CPT | Performed by: RADIOLOGY

## 2024-01-29 PROCEDURE — 2500000004 HC RX 250 GENERAL PHARMACY W/ HCPCS (ALT 636 FOR OP/ED): Performed by: RADIOLOGY

## 2024-01-29 PROCEDURE — 85610 PROTHROMBIN TIME: CPT | Performed by: RADIOLOGY

## 2024-01-29 PROCEDURE — 7100000002 HC RECOVERY ROOM TIME - EACH INCREMENTAL 1 MINUTE

## 2024-01-29 RX ORDER — SODIUM CHLORIDE 9 MG/ML
30 INJECTION, SOLUTION INTRAVENOUS CONTINUOUS
Status: DISCONTINUED | OUTPATIENT
Start: 2024-01-29 | End: 2024-01-30 | Stop reason: HOSPADM

## 2024-01-29 RX ORDER — DIPHENHYDRAMINE HYDROCHLORIDE 50 MG/ML
INJECTION INTRAMUSCULAR; INTRAVENOUS
Status: COMPLETED | OUTPATIENT
Start: 2024-01-29 | End: 2024-01-29

## 2024-01-29 RX ADMIN — DIPHENHYDRAMINE HYDROCHLORIDE 50 MG: 50 INJECTION INTRAMUSCULAR; INTRAVENOUS at 10:30

## 2024-01-29 ASSESSMENT — PAIN SCALES - GENERAL
PAINLEVEL_OUTOF10: 0 - NO PAIN

## 2024-01-29 ASSESSMENT — PAIN - FUNCTIONAL ASSESSMENT: PAIN_FUNCTIONAL_ASSESSMENT: 0-10

## 2024-01-29 NOTE — NURSING NOTE
Pt currently taking Suboxone. Pt's mother stating NO narcotics are to be given. IR aware. Pt upset, refusing procedure without sedation. Call placed to patient's pain management team. Verbal order given to me, approving patient receiving sedation during procedure.

## 2024-01-29 NOTE — Clinical Note
Mediport successfully removed from right chest, intact. Site closed with suture and surgical glue, left open to air. Pt tolerated procedure well. Pt to return to [facility] for recovery and discharge

## 2024-01-29 NOTE — PRE-PROCEDURE NOTE
Interventional Radiology Preprocedure Note    Indication for procedure: The encounter diagnosis was Port or reservoir infection, sequela. Presents for explant.    Relevant review of systems: NA    Relevant Labs:   Lab Results   Component Value Date    CREATININE 0.72 01/10/2024    EGFR >90 01/10/2024    INR 1.0 01/29/2024    PROTIME 11.6 01/29/2024       Planned Sedation/Anesthesia: Minimal    Airway assessment: normal    Directed physical examination:    Awake and alert, oriented  No acute distress  Regular rate, rhythm  Breathing non-labored      Mallampati: II (hard and soft palate, upper portion of tonsils anduvula visible)    ASA Score: ASA 2 - Patient with mild systemic disease with no functional limitations    Benefits, risks and alternatives of procedure and planned sedation have been discussed with the patient and/or their representative. All questions answered and they agree to proceed.

## 2024-01-29 NOTE — POST-PROCEDURE NOTE
Interventional Radiology Brief Postprocedure Note    Attending: Alvin Javier    Assistant: none    Diagnosis: Port or reservoir infection    Description of procedure: Successful removal of right chest mediport     Anesthesia:  25mg benadryl    Complications: none    Estimated Blood Loss: none    Medications (Filter: Administrations occurring from 1002 to 1055 on 01/29/24) As of 01/29/24 1055      diphenhydrAMINE (BENADryl) injection (mg) Total dose:  50 mg      Date/Time Rate/Dose/Volume Action       01/29/24  1030 50 mg Given                   No specimens collected      See detailed result report with images in PACS.    The patient tolerated the procedure well without incident or complication and is in stable condition.

## 2024-02-09 ENCOUNTER — TELEPHONE (OUTPATIENT)
Dept: PRIMARY CARE | Facility: CLINIC | Age: 29
End: 2024-02-09
Payer: COMMERCIAL

## 2024-03-01 ENCOUNTER — PATIENT MESSAGE (OUTPATIENT)
Dept: PRIMARY CARE | Facility: CLINIC | Age: 29
End: 2024-03-01
Payer: COMMERCIAL

## 2024-03-05 DIAGNOSIS — F41.1 GENERALIZED ANXIETY DISORDER: ICD-10-CM

## 2024-03-05 RX ORDER — RISPERIDONE 4 MG/1
TABLET ORAL
Qty: 30 TABLET | Refills: 1 | Status: SHIPPED | OUTPATIENT
Start: 2024-03-05

## 2024-03-05 RX ORDER — TRANEXAMIC ACID 650 MG/1
1300 TABLET ORAL 2 TIMES DAILY
Qty: 180 TABLET | Refills: 1 | OUTPATIENT
Start: 2024-03-05

## 2024-03-13 ENCOUNTER — SPECIALTY PHARMACY (OUTPATIENT)
Dept: PHARMACY | Facility: CLINIC | Age: 29
End: 2024-03-13

## 2024-03-25 ENCOUNTER — SPECIALTY PHARMACY (OUTPATIENT)
Dept: PHARMACY | Facility: CLINIC | Age: 29
End: 2024-03-25

## 2024-03-26 ENCOUNTER — SPECIALTY PHARMACY (OUTPATIENT)
Dept: PHARMACY | Facility: CLINIC | Age: 29
End: 2024-03-26

## 2024-04-02 ENCOUNTER — SPECIALTY PHARMACY (OUTPATIENT)
Dept: PHARMACY | Facility: CLINIC | Age: 29
End: 2024-04-02

## 2024-04-11 ENCOUNTER — SPECIALTY PHARMACY (OUTPATIENT)
Dept: PHARMACY | Facility: CLINIC | Age: 29
End: 2024-04-11

## 2024-06-14 ENCOUNTER — PATIENT MESSAGE (OUTPATIENT)
Dept: PRIMARY CARE | Facility: CLINIC | Age: 29
End: 2024-06-14
Payer: COMMERCIAL

## 2024-06-14 DIAGNOSIS — F41.1 GENERALIZED ANXIETY DISORDER: ICD-10-CM

## 2024-06-15 RX ORDER — CITALOPRAM 20 MG/1
20 TABLET, FILM COATED ORAL DAILY
Qty: 90 TABLET | Refills: 1 | Status: SHIPPED | OUTPATIENT
Start: 2024-06-15

## 2024-06-15 NOTE — TELEPHONE ENCOUNTER
From: Pretty Devine  To: Eric Aguilera  Sent: 6/14/2024 3:45 PM EDT  Subject: Medication refill    Can you have Dr aguilera send a refill script of my citalapram 20 mg for 30 days to [facility] in [place] please and thank you

## 2024-06-24 ENCOUNTER — TELEPHONE (OUTPATIENT)
Dept: NEUROLOGY | Facility: CLINIC | Age: 29
End: 2024-06-24
Payer: COMMERCIAL

## 2024-06-24 ENCOUNTER — PATIENT MESSAGE (OUTPATIENT)
Dept: PRIMARY CARE | Facility: CLINIC | Age: 29
End: 2024-06-24
Payer: COMMERCIAL

## 2024-06-24 DIAGNOSIS — I15.9 SECONDARY HYPERTENSION: ICD-10-CM

## 2024-06-24 DIAGNOSIS — G43.111 INTRACTABLE MIGRAINE WITH AURA WITH STATUS MIGRAINOSUS: Primary | ICD-10-CM

## 2024-06-24 DIAGNOSIS — I95.0 IDIOPATHIC HYPOTENSION: ICD-10-CM

## 2024-06-24 RX ORDER — MIDODRINE HYDROCHLORIDE 5 MG/1
5 TABLET ORAL 3 TIMES DAILY
Qty: 90 TABLET | Refills: 2 | Status: SHIPPED | OUTPATIENT
Start: 2024-06-24

## 2024-06-24 RX ORDER — UBROGEPANT 100 MG/1
100 TABLET ORAL DAILY PRN
Qty: 10 TABLET | Refills: 11 | Status: SHIPPED | OUTPATIENT
Start: 2024-06-24 | End: 2025-06-24

## 2024-06-24 RX ORDER — METOPROLOL SUCCINATE 25 MG/1
25 TABLET, EXTENDED RELEASE ORAL 2 TIMES DAILY
Qty: 60 TABLET | Refills: 3 | Status: SHIPPED | OUTPATIENT
Start: 2024-06-24 | End: 2024-06-25

## 2024-06-24 NOTE — TELEPHONE ENCOUNTER
From: Pretty Devine  To: Eric Aguilera  Sent: 6/24/2024 9:59 AM EDT  Subject: Medication     I need refills of the following medication to [facility] in [place] on [address]  Metoprolol sussinate 25 mg 2x daily  Midodrine 5mg 3x daily  Corlanor 5mg ( take half tablet twice a day)   Thank you

## 2024-06-25 RX ORDER — METOPROLOL SUCCINATE 25 MG/1
25 TABLET, EXTENDED RELEASE ORAL 2 TIMES DAILY
Qty: 180 TABLET | Refills: 0 | Status: SHIPPED | OUTPATIENT
Start: 2024-06-25

## 2024-06-25 RX ORDER — MIDODRINE HYDROCHLORIDE 5 MG/1
TABLET ORAL
Qty: 90 TABLET | Refills: 2 | Status: SHIPPED | OUTPATIENT
Start: 2024-06-25

## 2024-06-26 ENCOUNTER — TELEMEDICINE (OUTPATIENT)
Dept: PRIMARY CARE | Facility: CLINIC | Age: 29
End: 2024-06-26
Payer: COMMERCIAL

## 2024-06-26 DIAGNOSIS — L40.9 PSORIASIS OF SCALP: ICD-10-CM

## 2024-06-26 DIAGNOSIS — G43.709 CHRONIC MIGRAINE WITHOUT AURA WITHOUT STATUS MIGRAINOSUS, NOT INTRACTABLE: ICD-10-CM

## 2024-06-26 DIAGNOSIS — F31.9 BIPOLAR 1 DISORDER (MULTI): ICD-10-CM

## 2024-06-26 DIAGNOSIS — F41.1 GENERALIZED ANXIETY DISORDER: Primary | ICD-10-CM

## 2024-06-26 DIAGNOSIS — F11.21 OPIOID DEPENDENCE IN REMISSION (MULTI): ICD-10-CM

## 2024-06-26 DIAGNOSIS — I78.0: ICD-10-CM

## 2024-06-26 DIAGNOSIS — Q27.30 AVM (ARTERIOVENOUS MALFORMATION) (HHS-HCC): ICD-10-CM

## 2024-06-26 PROCEDURE — 99214 OFFICE O/P EST MOD 30 MIN: CPT | Performed by: FAMILY MEDICINE

## 2024-06-26 RX ORDER — TRIAMCINOLONE ACETONIDE 1 MG/G
CREAM TOPICAL 2 TIMES DAILY
Qty: 80 G | Refills: 4 | Status: SHIPPED | OUTPATIENT
Start: 2024-06-26 | End: 2024-06-26 | Stop reason: WASHOUT

## 2024-06-26 RX ORDER — TRIAMCINOLONE ACETONIDE 1 MG/G
CREAM TOPICAL 2 TIMES DAILY
Qty: 80 G | Refills: 4 | Status: SHIPPED | OUTPATIENT
Start: 2024-06-26

## 2024-09-30 DIAGNOSIS — R10.9 ABDOMINAL PAIN, UNSPECIFIED ABDOMINAL LOCATION: ICD-10-CM

## 2024-10-01 DIAGNOSIS — I15.9 SECONDARY HYPERTENSION: ICD-10-CM

## 2024-10-01 RX ORDER — LIDOCAINE 50 MG/G
PATCH TOPICAL
Qty: 60 PATCH | Refills: 2 | Status: SHIPPED | OUTPATIENT
Start: 2024-10-01

## 2024-10-01 RX ORDER — METOPROLOL SUCCINATE 25 MG/1
25 TABLET, EXTENDED RELEASE ORAL 2 TIMES DAILY
Qty: 180 TABLET | Refills: 0 | Status: SHIPPED | OUTPATIENT
Start: 2024-10-01

## 2024-10-04 DIAGNOSIS — F41.1 GENERALIZED ANXIETY DISORDER: ICD-10-CM

## 2024-10-04 RX ORDER — CITALOPRAM 20 MG/1
20 TABLET, FILM COATED ORAL DAILY
Qty: 90 TABLET | Refills: 1 | Status: SHIPPED | OUTPATIENT
Start: 2024-10-04

## 2024-12-13 ENCOUNTER — ANCILLARY PROCEDURE (OUTPATIENT)
Dept: URGENT CARE | Age: 29
End: 2024-12-13
Payer: MEDICARE

## 2024-12-13 ENCOUNTER — PATIENT MESSAGE (OUTPATIENT)
Dept: PRIMARY CARE | Facility: CLINIC | Age: 29
End: 2024-12-13
Payer: MEDICARE

## 2024-12-13 ENCOUNTER — OFFICE VISIT (OUTPATIENT)
Dept: URGENT CARE | Age: 29
End: 2024-12-13
Payer: MEDICARE

## 2024-12-13 VITALS
HEART RATE: 81 BPM | BODY MASS INDEX: 23.9 KG/M2 | HEIGHT: 64 IN | SYSTOLIC BLOOD PRESSURE: 104 MMHG | WEIGHT: 140 LBS | RESPIRATION RATE: 20 BRPM | DIASTOLIC BLOOD PRESSURE: 73 MMHG | TEMPERATURE: 98.1 F | OXYGEN SATURATION: 97 %

## 2024-12-13 DIAGNOSIS — I95.0 IDIOPATHIC HYPOTENSION: ICD-10-CM

## 2024-12-13 DIAGNOSIS — S99.912D INJURY OF LEFT ANKLE, SUBSEQUENT ENCOUNTER: ICD-10-CM

## 2024-12-13 DIAGNOSIS — S99.912A INJURY OF LEFT ANKLE, INITIAL ENCOUNTER: ICD-10-CM

## 2024-12-13 DIAGNOSIS — F41.1 GENERALIZED ANXIETY DISORDER: ICD-10-CM

## 2024-12-13 PROCEDURE — 73610 X-RAY EXAM OF ANKLE: CPT | Mod: LEFT SIDE

## 2024-12-13 ASSESSMENT — ENCOUNTER SYMPTOMS
NUMBNESS: 0
COLOR CHANGE: 0
JOINT SWELLING: 1
WEAKNESS: 0
BRUISES/BLEEDS EASILY: 0
ARTHRALGIAS: 1

## 2024-12-13 ASSESSMENT — PAIN SCALES - GENERAL: PAINLEVEL_OUTOF10: 5

## 2024-12-13 NOTE — PROGRESS NOTES
Subjective   Patient ID: Pretty Devine is a 29 y.o. female. They present today with a chief complaint of Ankle Injury (Left ankle, rolled it on the 9th. Swollen and toes numb. ROM is hard for pt. ).    History of Present Illness  Subjective  Pretty Devine is a 29 y.o. female who presents with left ankle pain. Onset of the symptoms was several days ago. Inciting event: inverted while ambulating. Current symptoms include ability to bear weight, but with some pain, pain at the anterior and and proximal metatarsals aspect of the ankle, pain with inversion of the foot, and stiffness. Aggravating symptoms: any weight bearing. Patient's overall course: symptoms have progressed to a point and plateaued. Patient has had prior ankle problems. Previous visits for this problem: none.  Evaluation to date: none. Treatment to date: avoidance of offending activity, ice, and OTC analgesics which are somewhat effective.    Objective  Right ankle: normal  Left ankle: normal    I      History provided by:  Patient   used: No    Ankle Injury      Past Medical History  Allergies as of 12/13/2024 - Reviewed 12/13/2024   Allergen Reaction Noted    Alteplase Unknown, Anaphylaxis, and Other 06/26/2019    Latex Unknown, Anaphylaxis, and Hives 03/10/2016    Fentanyl Unknown and Other 06/01/2018    Midazolam Unknown and Other 10/28/2019    Ceftriaxone Itching and Rash 05/06/2023    Sulfamethoxazole-trimethoprim Rash 11/09/2018       (Not in a hospital admission)       Past Medical History:   Diagnosis Date    Acute upper respiratory infection, unspecified     Acute upper respiratory infection    Disruption of external operation (surgical) wound, not elsewhere classified, initial encounter 09/04/2019    Dehiscence of incision, initial encounter    Effusion, left hip 05/18/2015    Swelling of left hip joint    Encounter for general adult medical examination without abnormal findings 08/19/2020    Encounter for Medicare  annual wellness exam    Encounter for other preprocedural examination     Other specified pre-operative examination    Encounter for other preprocedural examination     Pre-op testing    Fever presenting with conditions classified elsewhere 09/10/2018    Fever in other diseases    Fracture of unspecified part of neck of unspecified femur, initial encounter for closed fracture (Multi) 05/06/2015    Femoral neck fracture    Jaw pain 03/03/2021    Mandible pain    Other conditions influencing health status 10/29/2019    Inflammation    Other specified postprocedural states     S/P coronary angiogram    Other symptoms and signs involving the musculoskeletal system 01/10/2017    Weakness of both lower extremities    Pain in unspecified ankle and joints of unspecified foot 01/11/2022    Ankle pain    Pain in unspecified hip 04/09/2019    Hip pain    Pelvic and perineal pain 05/18/2015    Pelvic pain in female    Personal history of diseases of the blood and blood-forming organs and certain disorders involving the immune mechanism 04/02/2021    History of anemia    Personal history of diseases of the blood and blood-forming organs and certain disorders involving the immune mechanism 01/12/2022    History of anemia    Personal history of diseases of the skin and subcutaneous tissue 01/13/2022    History of folliculitis    Personal history of other diseases of the circulatory system 04/25/2019    History of paroxysmal supraventricular tachycardia    Personal history of other diseases of the digestive system 07/16/2020    History of hematemesis    Personal history of other diseases of the nervous system and sense organs 09/11/2019    History of ear pain    Personal history of other diseases of urinary system 11/12/2019    History of hematuria    Personal history of other drug therapy     History of drug therapy    Personal history of other drug therapy     History of drug therapy    Personal history of other endocrine,  nutritional and metabolic disease 05/11/2016    History of iron deficiency    Personal history of other endocrine, nutritional and metabolic disease 08/21/2020    History of obesity    Personal history of other infectious and parasitic diseases     History of herpes labialis    Personal history of other mental and behavioral disorders 06/03/2021    History of depression    Personal history of other specified conditions 08/21/2020    History of persistent cough    Personal history of other specified conditions 09/06/2017    History of epistaxis    Personal history of other specified conditions 05/24/2020    History of jaundice    Personal history of other specified conditions 07/13/2020    History of dysphagia    Personal history of other venous thrombosis and embolism 12/29/2022    History of deep venous thrombosis    Personal history of pulmonary embolism 03/29/2021    History of pulmonary embolism    Shortness of breath 09/10/2018    SOB (shortness of breath) on exertion    Trochanteric bursitis, unspecified hip 08/04/2015    Trochanteric bursitis       Past Surgical History:   Procedure Laterality Date    CT ABDOMEN PELVIS ANGIOGRAM W AND/OR WO IV CONTRAST  9/12/2019    CT ABDOMEN PELVIS ANGIOGRAM W AND/OR WO IV CONTRAST 9/12/2019 [facility] ANCILLARY LEGACY    CT ABDOMEN PELVIS ANGIOGRAM W AND/OR WO IV CONTRAST  10/22/2019    CT ABDOMEN PELVIS ANGIOGRAM W AND/OR WO IV CONTRAST [facility]    CT ABDOMEN PELVIS ANGIOGRAM W AND/OR WO IV CONTRAST  4/3/2022    CT ABDOMEN PELVIS ANGIOGRAM W AND/OR WO IV CONTRAST 4/3/2022 [facility] EMERGENCY LEGACY    CT ANGIO NECK  9/14/2022    CT NECK ANGIO W AND WO IV CONTRAST 9/14/2022 [facility] ANCILLARY LEGACY    CT HEAD ANGIO W AND WO IV CONTRAST  4/11/2019    CT HEAD ANGIO W AND WO IV CONTRAST 4/11/2019 [facility] EMERGENCY LEGACY    CT HEAD ANGIO W AND WO IV CONTRAST  9/26/2020    CT HEAD ANGIO W AND WO IV CONTRAST 9/26/2020 [facility] EMERGENCY LEGACY    CT HEAD ANGIO W AND WO IV CONTRAST  " 9/14/2022    CT HEAD ANGIO W AND WO IV CONTRAST 9/14/2022 [facility]CILLARY [facility]GAC[facility]    IR INTERVENTION VENOUS EMBOLIZATION  2/19/2020    IR INTERVENTION VENOUS EMBOLIZATION 2/19/2020 [facility]    IR INTERVENTION VENOUS SCLEROSIS  4/22/2019    IR INTERVENTION VENOUS SCLEROSIS 4/22/2019 [facility]GAC[facility]    MR CHEST ANGIO W AND WO IV CONTRAST  2/5/2020    MR CHEST ANGIO W AND WO IV CONTRAST 2/5/2020 [facility]    MR HEAD ANGIO WO IV CONTRAST  12/10/2020    MR HEAD ANGIO WO IV CONTRAST 12/10/2020 [facility]    OTHER SURGICAL HISTORY  04/06/2015    Hip Surgery Left    OTHER SURGICAL HISTORY  01/13/2022    Hysterectomy    OTHER SURGICAL HISTORY  09/24/2018    Abdominal Surgery    OTHER SURGICAL HISTORY  09/24/2018    Angioplasty    OTHER SURGICAL HISTORY  09/24/2018    Central Intravenous Catheter        reports that she has never smoked. She has never used smokeless tobacco. She reports current alcohol use of about 1.0 standard drink of alcohol per week. She reports that she does not use drugs.    Review of Systems  Review of Systems   Musculoskeletal:  Positive for arthralgias, gait problem and joint swelling.   Skin: Negative.  Negative for color change and pallor.   Neurological:  Negative for weakness and numbness.   Hematological:  Does not bruise/bleed easily.                                  Objective    Vitals:    12/13/24 1446   BP: 104/73   BP Location: Left arm   Patient Position: Sitting   BP Cuff Size: Adult   Pulse: 81   Resp: 20   Temp: 36.7 °C (98.1 °F)   TempSrc: Temporal   SpO2: 97%   Weight: 63.5 kg (140 lb)   Height: 1.626 m (5' 4\")     No LMP recorded. Patient has had a hysterectomy.    Physical Exam  Vitals and nursing note reviewed.   Constitutional:       General: She is not in acute distress.     Appearance: Normal appearance. She is not ill-appearing, toxic-appearing or diaphoretic.   Cardiovascular:      Rate and Rhythm: Normal rate.      Pulses: Normal pulses.   Pulmonary:     "  Effort: Pulmonary effort is normal.   Musculoskeletal:         General: Tenderness and signs of injury present. No swelling or deformity. Normal range of motion.   Skin:     General: Skin is warm and dry.      Capillary Refill: Capillary refill takes less than 2 seconds.      Coloration: Skin is not jaundiced or pale.      Findings: No bruising or erythema.   Neurological:      General: No focal deficit present.      Mental Status: She is alert and oriented to person, place, and time.      Sensory: No sensory deficit.         Procedures    Point of Care Test & Imaging Results from this visit  No results found for this visit on 12/13/24.   No results found.    Diagnostic study results (if any) were reviewed by MARIAJOSE Goss.    Assessment/Plan   Allergies, medications, history, and pertinent labs/EKGs/Imaging reviewed by MARIAJOSE Goss.     Medical Decision Making    Patient at time of discharge was clinically well-appearing and HDS for outpatient management. The patient and/or family was given the opportunity to ask questions prior to discharge, understood my verbal discussion of the plans for treatment, expected course, indications to return to  or seek further treatment in ED, and the need for timely follow up as directed.    Condition: Stable  Disposition: Discharged    Orders and Diagnoses  Diagnoses and all orders for this visit:  Injury of left ankle, subsequent encounter  -     XR ankle left 2 views; Future      Medical Admin Record      Patient disposition: Home    Electronically signed by MARIAJOSE Goss  3:19 PM

## 2024-12-16 DIAGNOSIS — I95.0 IDIOPATHIC HYPOTENSION: ICD-10-CM

## 2024-12-16 RX ORDER — IVABRADINE 5 MG/1
2.5 TABLET, FILM COATED ORAL 2 TIMES DAILY
Qty: 30 TABLET | Refills: 1 | Status: SHIPPED | OUTPATIENT
Start: 2024-12-16 | End: 2025-02-14

## 2024-12-16 NOTE — TELEPHONE ENCOUNTER
[id number] - [place], [place] - [address] [facility] [address]   Pt needs refill on  ivabradine (Corlanor) 5 mg tablet   CB has no available appts until after the New Year, she is scheduled in January.

## 2024-12-16 NOTE — TELEPHONE ENCOUNTER
Recent Visits  No visits were found meeting these conditions.  Showing recent visits within past 180 days and meeting all other requirements  Future Appointments  Date Type Provider Dept   01/22/25 Appointment Eric Aguilera MD [facility]   Showing future appointments within next 90 days and meeting all other requirements

## 2024-12-17 RX ORDER — LEG BRACE
EACH MISCELLANEOUS
Qty: 1 EACH | Refills: 0 | Status: SHIPPED | OUTPATIENT
Start: 2024-12-17

## 2024-12-17 RX ORDER — IVABRADINE 5 MG/1
2.5 TABLET, FILM COATED ORAL 2 TIMES DAILY
Qty: 30 TABLET | Refills: 0 | Status: SHIPPED | OUTPATIENT
Start: 2024-12-17 | End: 2025-02-15

## 2024-12-20 RX ORDER — CITALOPRAM 20 MG/1
20 TABLET, FILM COATED ORAL DAILY
Qty: 90 TABLET | Refills: 1 | Status: SHIPPED | OUTPATIENT
Start: 2024-12-20

## 2025-01-22 ENCOUNTER — APPOINTMENT (OUTPATIENT)
Dept: PRIMARY CARE | Facility: CLINIC | Age: 30
End: 2025-01-22
Payer: MEDICARE

## 2025-02-03 ENCOUNTER — APPOINTMENT (OUTPATIENT)
Dept: NEUROLOGY | Facility: CLINIC | Age: 30
End: 2025-02-03
Payer: COMMERCIAL

## 2025-02-13 PROBLEM — U07.1 DISEASE DUE TO SEVERE ACUTE RESPIRATORY SYNDROME CORONAVIRUS 2 (SARS-COV-2): Status: ACTIVE | Noted: 2025-02-13

## 2025-02-13 PROBLEM — G96.811: Status: ACTIVE | Noted: 2025-02-13

## 2025-02-13 PROBLEM — D69.6 LOW PLATELET COUNT (CMS-HCC): Status: ACTIVE | Noted: 2023-05-05

## 2025-02-13 PROBLEM — R17 JAUNDICE: Status: ACTIVE | Noted: 2025-02-13

## 2025-02-13 PROBLEM — R55 SYNCOPE AND COLLAPSE: Status: ACTIVE | Noted: 2018-11-06

## 2025-02-13 PROBLEM — H69.90 DYSFUNCTION OF EUSTACHIAN TUBE: Status: ACTIVE | Noted: 2025-02-13

## 2025-02-13 PROBLEM — G96.00 CEREBROSPINAL FLUID LEAK: Status: ACTIVE | Noted: 2025-02-13

## 2025-02-13 PROBLEM — R91.1 LUNG NODULE: Status: ACTIVE | Noted: 2023-07-14

## 2025-02-13 PROBLEM — M79.89 SWELLING OF UPPER EXTREMITY: Status: ACTIVE | Noted: 2022-11-17

## 2025-02-13 PROBLEM — L73.9 FOLLICULITIS: Status: ACTIVE | Noted: 2025-02-13

## 2025-02-13 PROBLEM — N63.0 MASS OF BREAST: Status: ACTIVE | Noted: 2023-03-07

## 2025-02-13 PROBLEM — L23.9 CONTACT HYPERSENSITIVITY: Status: ACTIVE | Noted: 2019-03-29

## 2025-02-13 PROBLEM — R11.10 VOMITING: Status: ACTIVE | Noted: 2018-04-14

## 2025-02-13 PROBLEM — J40 BRONCHITIS: Status: ACTIVE | Noted: 2018-10-16

## 2025-02-13 PROBLEM — F68.10: Status: ACTIVE | Noted: 2025-02-13

## 2025-02-13 PROBLEM — R07.2 PRECORDIAL PAIN: Status: ACTIVE | Noted: 2019-01-25

## 2025-02-13 PROBLEM — T23.269A: Status: ACTIVE | Noted: 2023-03-07

## 2025-02-13 PROBLEM — G43.119 CLASSICAL MIGRAINE WITH INTRACTABLE MIGRAINE: Status: ACTIVE | Noted: 2022-09-27

## 2025-02-13 PROBLEM — R42 POSTURAL LIGHTHEADEDNESS: Status: ACTIVE | Noted: 2025-02-13

## 2025-02-13 PROBLEM — R63.4 ABNORMAL WEIGHT LOSS: Status: ACTIVE | Noted: 2023-02-03

## 2025-02-13 PROBLEM — G52.9 DISORDER OF CRANIAL NERVE: Status: ACTIVE | Noted: 2023-06-28

## 2025-02-13 PROBLEM — L93.0 LUPUS ERYTHEMATOSUS: Status: ACTIVE | Noted: 2019-01-25

## 2025-02-13 PROBLEM — Z91.52 PERSONAL HISTORY OF NONSUICIDAL SELF-HARM: Status: ACTIVE | Noted: 2023-06-28

## 2025-02-13 PROBLEM — Z86.59 HISTORY OF DEPRESSION: Status: ACTIVE | Noted: 2025-02-13

## 2025-02-13 PROBLEM — E61.1 IRON DEFICIENCY: Status: ACTIVE | Noted: 2018-11-06

## 2025-02-13 PROBLEM — I10 ESSENTIAL (PRIMARY) HYPERTENSION: Status: ACTIVE | Noted: 2023-06-28

## 2025-02-13 PROBLEM — S93.409A SPRAIN OF ANKLE: Status: ACTIVE | Noted: 2025-02-13

## 2025-02-13 PROBLEM — S62.009A CLOSED FRACTURE OF SCAPHOID: Status: ACTIVE | Noted: 2025-02-13

## 2025-02-13 PROBLEM — K59.00 CONSTIPATION: Status: ACTIVE | Noted: 2023-01-04

## 2025-02-13 PROBLEM — E66.9 OBESITY: Status: ACTIVE | Noted: 2025-02-13

## 2025-02-13 PROBLEM — G90.A POSTURAL ORTHOSTATIC TACHYCARDIA SYNDROME: Status: ACTIVE | Noted: 2023-07-11

## 2025-02-13 PROBLEM — Z20.822 CONTACT WITH AND (SUSPECTED) EXPOSURE TO COVID-19: Status: ACTIVE | Noted: 2023-04-29

## 2025-02-13 PROBLEM — Z86.16 PERSONAL HISTORY OF COVID-19: Status: ACTIVE | Noted: 2022-11-18

## 2025-02-13 PROBLEM — R74.01 ELEVATION OF LEVELS OF LIVER TRANSAMINASE LEVELS: Status: ACTIVE | Noted: 2023-07-11

## 2025-02-13 PROBLEM — R13.10 DYSPHAGIA: Status: ACTIVE | Noted: 2025-02-13

## 2025-02-13 PROBLEM — E83.42 HYPOMAGNESEMIA: Status: ACTIVE | Noted: 2025-02-13

## 2025-02-13 PROBLEM — J18.9 PNEUMONIA: Status: ACTIVE | Noted: 2023-04-29

## 2025-02-13 PROBLEM — I47.11 INAPPROPRIATE SINUS TACHYCARDIA (CMS-HCC): Status: ACTIVE | Noted: 2023-01-31

## 2025-02-13 PROBLEM — R05.3 PERSISTENT COUGH: Status: ACTIVE | Noted: 2025-02-13

## 2025-02-13 PROBLEM — G40.109 FOCAL MOTOR SEIZURE (MULTI): Status: ACTIVE | Noted: 2025-02-13

## 2025-02-13 RX ORDER — BUPRENORPHINE 300 MG/1
SOLUTION SUBCUTANEOUS
COMMUNITY
Start: 2024-12-09

## 2025-02-14 ENCOUNTER — APPOINTMENT (OUTPATIENT)
Dept: PRIMARY CARE | Facility: CLINIC | Age: 30
End: 2025-02-14
Payer: MEDICARE

## 2025-02-18 ENCOUNTER — APPOINTMENT (OUTPATIENT)
Dept: PRIMARY CARE | Facility: CLINIC | Age: 30
End: 2025-02-18
Payer: MEDICARE

## 2025-02-24 ENCOUNTER — APPOINTMENT (OUTPATIENT)
Dept: PRIMARY CARE | Facility: CLINIC | Age: 30
End: 2025-02-24
Payer: MEDICARE

## 2025-02-24 VITALS
HEART RATE: 68 BPM | HEIGHT: 64 IN | DIASTOLIC BLOOD PRESSURE: 62 MMHG | TEMPERATURE: 98.3 F | BODY MASS INDEX: 22.88 KG/M2 | WEIGHT: 134 LBS | SYSTOLIC BLOOD PRESSURE: 126 MMHG | OXYGEN SATURATION: 99 % | RESPIRATION RATE: 16 BRPM

## 2025-02-24 DIAGNOSIS — L30.9 DERMATITIS: ICD-10-CM

## 2025-02-24 DIAGNOSIS — J30.1 SEASONAL ALLERGIC RHINITIS DUE TO POLLEN: ICD-10-CM

## 2025-02-24 DIAGNOSIS — M32.9 SYSTEMIC LUPUS ERYTHEMATOSUS, UNSPECIFIED SLE TYPE, UNSPECIFIED ORGAN INVOLVEMENT STATUS (MULTI): ICD-10-CM

## 2025-02-24 DIAGNOSIS — R56.9 SEIZURES (MULTI): ICD-10-CM

## 2025-02-24 DIAGNOSIS — F41.1 GENERALIZED ANXIETY DISORDER: ICD-10-CM

## 2025-02-24 DIAGNOSIS — E55.9 VITAMIN D DEFICIENCY: ICD-10-CM

## 2025-02-24 DIAGNOSIS — I15.9 SECONDARY HYPERTENSION: ICD-10-CM

## 2025-02-24 DIAGNOSIS — F11.11 HISTORY OF OPIOID ABUSE (MULTI): Primary | ICD-10-CM

## 2025-02-24 DIAGNOSIS — E11.9 TYPE 2 DIABETES MELLITUS WITHOUT COMPLICATION, WITHOUT LONG-TERM CURRENT USE OF INSULIN (MULTI): ICD-10-CM

## 2025-02-24 DIAGNOSIS — D50.9 IRON DEFICIENCY ANEMIA, UNSPECIFIED IRON DEFICIENCY ANEMIA TYPE: ICD-10-CM

## 2025-02-24 DIAGNOSIS — R53.83 FATIGUE, UNSPECIFIED TYPE: ICD-10-CM

## 2025-02-24 DIAGNOSIS — F11.21 OPIOID DEPENDENCE IN REMISSION (MULTI): ICD-10-CM

## 2025-02-24 DIAGNOSIS — I95.0 IDIOPATHIC HYPOTENSION: ICD-10-CM

## 2025-02-24 DIAGNOSIS — K75.4 AUTOIMMUNE HEPATITIS (MULTI): ICD-10-CM

## 2025-02-24 DIAGNOSIS — I42.9 CARDIOMYOPATHY, UNSPECIFIED TYPE (MULTI): ICD-10-CM

## 2025-02-24 DIAGNOSIS — D61.818 OTHER PANCYTOPENIA (MULTI): ICD-10-CM

## 2025-02-24 DIAGNOSIS — F31.9 BIPOLAR 1 DISORDER (MULTI): ICD-10-CM

## 2025-02-24 DIAGNOSIS — G82.20 PARAPARESIS (MULTI): ICD-10-CM

## 2025-02-24 PROCEDURE — G2211 COMPLEX E/M VISIT ADD ON: HCPCS | Performed by: FAMILY MEDICINE

## 2025-02-24 PROCEDURE — 1036F TOBACCO NON-USER: CPT | Performed by: FAMILY MEDICINE

## 2025-02-24 PROCEDURE — 3074F SYST BP LT 130 MM HG: CPT | Performed by: FAMILY MEDICINE

## 2025-02-24 PROCEDURE — 3078F DIAST BP <80 MM HG: CPT | Performed by: FAMILY MEDICINE

## 2025-02-24 PROCEDURE — 96372 THER/PROPH/DIAG INJ SC/IM: CPT | Performed by: FAMILY MEDICINE

## 2025-02-24 PROCEDURE — 3008F BODY MASS INDEX DOCD: CPT | Performed by: FAMILY MEDICINE

## 2025-02-24 PROCEDURE — 99214 OFFICE O/P EST MOD 30 MIN: CPT | Performed by: FAMILY MEDICINE

## 2025-02-24 RX ORDER — IVABRADINE 5 MG/1
2.5 TABLET, FILM COATED ORAL 2 TIMES DAILY
Qty: 30 TABLET | Refills: 2 | Status: SHIPPED | OUTPATIENT
Start: 2025-02-24 | End: 2025-05-25

## 2025-02-24 RX ORDER — TRIAMCINOLONE ACETONIDE 40 MG/ML
80 INJECTION, SUSPENSION INTRA-ARTICULAR; INTRAMUSCULAR ONCE
Status: COMPLETED | OUTPATIENT
Start: 2025-02-24 | End: 2025-02-24

## 2025-02-24 RX ORDER — LORATADINE 10 MG/1
10 TABLET ORAL DAILY
Qty: 30 TABLET | Refills: 2 | Status: SHIPPED | OUTPATIENT
Start: 2025-02-24 | End: 2025-05-25

## 2025-02-24 RX ORDER — CITALOPRAM 20 MG/1
20 TABLET, FILM COATED ORAL DAILY
Qty: 90 TABLET | Refills: 0 | Status: SHIPPED | OUTPATIENT
Start: 2025-02-24

## 2025-02-24 RX ORDER — MIDODRINE HYDROCHLORIDE 5 MG/1
5 TABLET ORAL 3 TIMES DAILY
Qty: 90 TABLET | Refills: 2 | Status: SHIPPED | OUTPATIENT
Start: 2025-02-24

## 2025-02-24 RX ORDER — METOPROLOL SUCCINATE 25 MG/1
25 TABLET, EXTENDED RELEASE ORAL 2 TIMES DAILY
Qty: 180 TABLET | Refills: 0 | Status: SHIPPED | OUTPATIENT
Start: 2025-02-24

## 2025-02-24 RX ADMIN — TRIAMCINOLONE ACETONIDE 80 MG: 40 INJECTION, SUSPENSION INTRA-ARTICULAR; INTRAMUSCULAR at 09:31

## 2025-02-24 ASSESSMENT — PATIENT HEALTH QUESTIONNAIRE - PHQ9
1. LITTLE INTEREST OR PLEASURE IN DOING THINGS: NOT AT ALL
SUM OF ALL RESPONSES TO PHQ9 QUESTIONS 1 AND 2: 0
2. FEELING DOWN, DEPRESSED OR HOPELESS: NOT AT ALL

## 2025-02-24 ASSESSMENT — ENCOUNTER SYMPTOMS
LOSS OF SENSATION IN FEET: 0
DEPRESSION: 0
OCCASIONAL FEELINGS OF UNSTEADINESS: 0

## 2025-02-24 NOTE — PROGRESS NOTES
Subjective   Patient ID: Pretty Devine is a 30 y.o. female who presents for Annual Exam and Psoriasis.    HPI   Patient is at the office today with concerns of psoriasis. Patient reports skin around chest area is dry. Patient reports she is using lotions prescribed to manage symptoms. This is an ongoing issue for the patient.    Patient is also here to get Annual exam.      Review of Systems  12 Systems have been reviewed as follows.  Constitutional: Fever, weight gain, weight loss, appetite change, night sweats, fatigue, chills.  Eyes : blurry, double vision, vision, loss, tearing, redness, pain, sensitivity to light, glaucoma.  Ears, nose, mouth, and throat: Hearing loss, ringing in the ears, ear pain, nasal congestion, nasal drainage, nosebleeds, mouth, throat, irritation tooth problem.  Cardiovascular :chest pain, pressure, heart racing, palpitations, sweating, leg swelling, high or low blood pressure  Pulmonary: Cough, yellow or green sputum, blood and sputum, shortness of breath, wheezing  Gastrointestinal: Nausea, vomiting, diarrhea, constipation, pain, blood in stool, or vomitus, heartburn, difficulty swallowing  Genitourinary: incontinence, abnormal bleeding, abnormal discharge, urinary frequency, urinary hesitancy, pain, impotence sexual problem, infection, urinary retention  Musculoskeletal: Pain, stiffness, joint, redness or warmth, arthritis, back pain, weakness, muscle wasting, sprain or fracture  Neuro: Weight weakness, dizziness, change in voice, change in taste change in vision, change in hearing, loss, or change of sensation, trouble walking, balance problems coordination problems, shaking, speech problem  Endocrine , cold or heat intolerance, blood sugar problem, weight gain or loss missed periods hot flashes, sweats, change in body hair, change in libido, increased thirst, increased urination  Heme/lymph: Swelling, bleeding, problem anemia, bruising, enlarged lymph nodes  Allergic/immunologic:  "H. plus nasal drip, watery itchy eyes, nasal drainage, immunosuppressed  The above were reviewed and noted negative except as noted in HPI and Problem List.    Objective   /62 (BP Location: Left arm, Patient Position: Sitting, BP Cuff Size: Adult)   Pulse 68   Temp 36.8 °C (98.3 °F) (Temporal)   Resp 16   Ht 1.626 m (5' 4\")   Wt 60.8 kg (134 lb)   SpO2 99%   BMI 23.00 kg/m²     Physical Exam  Constitutional: Well developed, well nourished, alert and in no acute distress   Eyes: Normal external exam. Pupils equally round and reactive to light with normal accommodation and extraocular movements intact.  Neck: Supple, no lymphadenopathy or masses.   Cardiovascular: Regular rate and rhythm, normal S1 and S2, no murmurs, gallops, or rubs. Radial pulses normal. No peripheral edema.  Pulmonary: No respiratory distress, lungs clear to auscultation bilaterally. No wheezes, rhonchi, rales.  Abdomen: soft,non tender, non distended, without masses or HSM  Skin: Warm, well perfused, normal skin turgor and color.   Neurologic: Cranial nerves II-XII grossly intact.   Psychiatric: Mood calm and affect normal  Musculoskeletal: Moving all extremities without restriction  Eczema  Assessment/Plan   Problem List Items Addressed This Visit             ICD-10-CM    Anxiety disorder F41.9    Relevant Medications    citalopram (CeleXA) 20 mg tablet    Other Relevant Orders    Follow Up In [facility] - PCP - Established    Fatigue R53.83    Relevant Orders    Thyroid Stimulating Hormone    Systemic lupus erythematosus, unspecified SLE type, unspecified organ involvement status (Multi) M32.9    Relevant Orders    Follow Up In [facility] - PCP - Established    Vitamin D deficiency E55.9    Relevant Orders    Vitamin D 25-Hydroxy,Total (for eval of Vitamin D levels)    Bipolar 1 disorder (Multi) F31.9    Relevant Orders    Follow Up In [facility] - PCP - Established    Other pancytopenia (Multi) " D61.818    Relevant Orders    Follow Up In [facility] - PCP - Established    Iron deficiency anemia D50.9    Relevant Orders    CBC and Auto Differential    Seizures (Multi) R56.9    Relevant Orders    Follow Up In [facility] - PCP - Established    Paraparesis (Multi) G82.20    Relevant Orders    Follow Up In [facility] - PCP - Established    Opioid dependence in remission (Multi) F11.21    Relevant Orders    Follow Up In [facility] - PCP - Established    Cardiomyopathy I42.9    Relevant Medications    midodrine (Proamatine) 5 mg tablet    metoprolol succinate XL (Toprol-XL) 25 mg 24 hr tablet    ivabradine (Corlanor) 5 mg tablet    Other Relevant Orders    Follow Up In [facility] - PCP - Established    History of opioid abuse (Multi) - Primary F11.11    Relevant Orders    Follow Up In [facility] - PCP - Established    Secondary hypertension I15.9    Relevant Medications    metoprolol succinate XL (Toprol-XL) 25 mg 24 hr tablet    Other Relevant Orders    Follow Up In [facility] - PCP - Established    Comprehensive Metabolic Panel    Lipid Panel     Other Visit Diagnoses         Codes    Idiopathic hypotension     I95.0    Relevant Medications    midodrine (Proamatine) 5 mg tablet    ivabradine (Corlanor) 5 mg tablet    Other Relevant Orders    Follow Up In [facility] - PCP - Established    Autoimmune hepatitis (Multi)     K75.4    Relevant Orders    Follow Up In [facility] - PCP - Established    Type 2 diabetes mellitus without complication, without long-term current use of insulin (Multi)     E11.9    Relevant Orders    Follow Up In [facility] - PCP - Established    Hemoglobin A1C    Albumin-Creatinine Ratio, Urine Random    Dermatitis     L30.9    Relevant Medications    loratadine (Claritin) 10 mg tablet          Continue current medications and therapy for chronic medical conditions    Kenalog IM    BW  now incl iron    Opiod abuse resolved    Patient has given verbal permission to speak to her mother     Did not see Dr. Reinoso      No narcotics or controlled meds     Discuss otezla next     Cont pregabalin     continue all current medications and therapy for chronic medical conditions      Referred to Gastro.      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

## 2025-02-26 ENCOUNTER — TELEPHONE (OUTPATIENT)
Dept: NEUROLOGY | Facility: CLINIC | Age: 30
End: 2025-02-26

## 2025-02-26 ENCOUNTER — APPOINTMENT (OUTPATIENT)
Dept: NEUROLOGY | Facility: CLINIC | Age: 30
End: 2025-02-26
Payer: MEDICARE

## 2025-02-26 NOTE — TELEPHONE ENCOUNTER
Caro Olsen did not show to her virtual visit   This was scheduled as a routine visit so that I can prescribe her Ubrelvy   If she has another doctor prescribing her Ubrelvy then that is fine otherwise I will need to see her at least once a year to keep up this prescription

## 2025-03-03 ENCOUNTER — APPOINTMENT (OUTPATIENT)
Dept: PRIMARY CARE | Facility: CLINIC | Age: 30
End: 2025-03-03
Payer: MEDICARE

## 2025-04-09 DIAGNOSIS — B00.1 COLD SORE: ICD-10-CM

## 2025-04-09 RX ORDER — VALACYCLOVIR HYDROCHLORIDE 1 G/1
1000 TABLET, FILM COATED ORAL 3 TIMES DAILY
Qty: 90 TABLET | Refills: 0 | Status: SHIPPED | OUTPATIENT
Start: 2025-04-09 | End: 2025-05-09

## 2025-06-26 ENCOUNTER — APPOINTMENT (OUTPATIENT)
Dept: CT IMAGING | Age: 30
End: 2025-06-26

## 2025-06-26 ENCOUNTER — HOSPITAL ENCOUNTER (EMERGENCY)
Age: 30
Discharge: HOME | End: 2025-06-26

## 2025-06-26 VITALS
TEMPERATURE: 97.7 F | SYSTOLIC BLOOD PRESSURE: 110 MMHG | BODY MASS INDEX: 21.13 KG/M2 | WEIGHT: 123.8 LBS | HEART RATE: 90 BPM | RESPIRATION RATE: 17 BRPM | HEIGHT: 64 IN | DIASTOLIC BLOOD PRESSURE: 80 MMHG | OXYGEN SATURATION: 99 %

## 2025-06-26 DIAGNOSIS — I78.0 OSLER-WEBER-RENDU DISEASE: ICD-10-CM

## 2025-06-26 DIAGNOSIS — S39.012A BACK STRAIN, INITIAL ENCOUNTER: Primary | ICD-10-CM

## 2025-06-26 LAB
CHP ED QC CHECK: NORMAL
GLUCOSE BLD-MCNC: 106 MG/DL
GLUCOSE BLD-MCNC: 106 MG/DL (ref 70–99)
PERFORMED ON: ABNORMAL

## 2025-06-26 PROCEDURE — 99284 EMERGENCY DEPT VISIT MOD MDM: CPT

## 2025-06-26 PROCEDURE — 72128 CT CHEST SPINE W/O DYE: CPT

## 2025-06-26 ASSESSMENT — ENCOUNTER SYMPTOMS
RHINORRHEA: 0
BACK PAIN: 1
SORE THROAT: 0
VOMITING: 0
COUGH: 0
DIARRHEA: 0
NAUSEA: 0
ABDOMINAL PAIN: 0

## 2025-06-26 ASSESSMENT — PAIN - FUNCTIONAL ASSESSMENT: PAIN_FUNCTIONAL_ASSESSMENT: 0-10

## 2025-06-26 ASSESSMENT — LIFESTYLE VARIABLES
HOW MANY STANDARD DRINKS CONTAINING ALCOHOL DO YOU HAVE ON A TYPICAL DAY: PATIENT DOES NOT DRINK
HOW OFTEN DO YOU HAVE A DRINK CONTAINING ALCOHOL: NEVER

## 2025-06-26 ASSESSMENT — PAIN SCALES - GENERAL: PAINLEVEL_OUTOF10: 6

## 2025-06-26 ASSESSMENT — PAIN DESCRIPTION - LOCATION: LOCATION: BACK

## 2025-06-26 NOTE — ED TRIAGE NOTES
Pt hurt her back last night at work pushing a bed   Pt states her job stated she needed a drug test   Pt states her pain is a 6/10  Pt took a tylenol earlier   Pt has a slow but steady gait

## 2025-06-26 NOTE — ED PROVIDER NOTES
[facility] EMERGENCY DEPARTMENT  EMERGENCY DEPARTMENT ENCOUNTER      Pt Name: Lenore Bernstein  MRN: [id number]  Birthdate 1995  Date of evaluation: 6/26/2025  Provider: Gray Hatch PA-C  10:02 AM EDT    CHIEF COMPLAINT       Chief Complaint   Patient presents with    Back Pain     Hurt her back at NH last night (pushing a bed)         HISTORY OF PRESENT ILLNESS   (Location/Symptom, Timing/Onset, Context/Setting, Quality, Duration, Modifying Factors, Severity)  Note limiting factors.   Lenore Bernstein is a 30 y.o. 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 She states that she she heard a pop and then started having pain down the back and to her legs. She denies numbness or tingling. She denies bowel or bladder incontinence.  She denies saddle anesthesias.  States that she has been feeling well otherwise.  She states that she took Tylenol and has been using lidocaine patches at home.    HPI    Nursing Notes were reviewed.    REVIEW OF SYSTEMS    (2-9 systems for level 4, 10 or more for level 5)     Review of Systems   Constitutional:  Negative for chills and fever.   HENT:  Negative for congestion, rhinorrhea and sore throat.    Respiratory:  Negative for cough.    Cardiovascular:  Negative for chest pain.   Gastrointestinal:  Negative for abdominal pain, diarrhea, nausea and vomiting.   Genitourinary:  Negative for dysuria.   Musculoskeletal:  Positive for back pain. Negative for myalgias.   Neurological:  Negative for headaches.       Except as noted above the remainder of the review of systems was reviewed and negative.       PAST MEDICAL HISTORY     Past Medical History:   Diagnosis Date    Anemia     Anxiety     Autoimmune disorder     Depression     Diabetes (HCC)     Drug-seeking behavior     Epilepsy (HCC)     since childhood

## 2025-07-14 ENCOUNTER — HOSPITAL ENCOUNTER (OUTPATIENT)
Dept: PHYSICAL THERAPY | Age: 30
Setting detail: THERAPIES SERIES
Discharge: HOME | End: 2025-07-14
Payer: COMMERCIAL

## 2025-07-14 ENCOUNTER — APPOINTMENT (OUTPATIENT)
Dept: PRIMARY CARE | Facility: CLINIC | Age: 30
End: 2025-07-14
Payer: MEDICARE

## 2025-07-14 PROCEDURE — 97110 THERAPEUTIC EXERCISES: CPT

## 2025-07-14 PROCEDURE — 97163 PT EVAL HIGH COMPLEX 45 MIN: CPT

## 2025-07-14 NOTE — PLAN OF CARE
None/bedrest/wheelchair/nurse 30  15  0    IV/Heparin Lock [] Yes  [x] No 20  0    Gait/Transferring [] Impaired  [] Weak  [x] Normal/bedrest/immobile 20  10  0    Mental Status [] Forgets limitations  [x] Oriented to own ability 15  0       Total: 0     Based on the Assessment score: check the appropriate box.  [x]  No intervention needed   Low =   Score of 0-24  []  Use standard prevention interventions Moderate =  Score of 24-44   [] Discuss fall prevention strategies   [] Indicate moderate falls risk on eval  []  Use high risk prevention interventions High = Score of 45 and higher   [] Discuss fall prevention strategies   [] Provide supervision during treatment time    Minutes  PT Individual Minutes  Time In: 1351  Time Out: 1435  Minutes: 44  Timed Code Treatment Minutes: 8 Minutes  Procedure Minutes: 36     Time In Time Out Total Time Units    PT Evaluation:High Complexity (61598) 13:51 14:27 36 1   Ther ex (49918) 14:27 14:35 8 1     Electronically signed by Kimberly Vyas PT on 7/14/25 at 1:50 PM EDT           Please sign Physician's Certification and return to:   [facility]  [address] [facility] [address]  Dept: [phone]  Dept Fax: [phone]  Loc: [phone]    Physician's Certification / Comments     Statement of Medical Necessity: Physical Therapy is both indicated and medically necessary as outlined in the POC to increase the likelihood of meeting the functionally related goals stated above.     Patient to be seen 2-3 times per week for 4-6 weeks  Certification period from 7/14/2025  to 08/15/25    If you have any questions or concerns, please don't hesitate to call.  Thank you for your referral.    I have reviewed this plan of care and certify a need for medically necessary rehabilitation services.    Physician Signature:__________________________________________________________  Date:  Please sign and return

## 2025-07-16 ENCOUNTER — HOSPITAL ENCOUNTER (OUTPATIENT)
Dept: PHYSICAL THERAPY | Age: 30
Setting detail: THERAPIES SERIES
Discharge: HOME | End: 2025-07-16
Payer: COMMERCIAL

## 2025-07-16 NOTE — PROGRESS NOTES
Therapy                            Cancellation/No-show Note      Date: 2025  Patient: Lenore Bernstein (30 y.o. female)  : 1995  MRN:  [id number]  Referring Physician: Jose Landa DO    Medical Diagnosis: Sprain of ligaments of thoracic spine, initial encounter [S23.3XXA]  Sprain of ligaments of lumbar spine, initial encounter [S33.5XXA]      Visit Information:  Visits to Date 1   No Show/Cancelled Appts:       For today's appointment patient:  []  Cancelled  []  Rescheduled appointment  [x]  No-show   [x]  Called pt to remind of next appointment- left voicemail     Reason given by patient:  []  Patient ill  []  Conflicting appointment  []  No transportation    []  Conflict with work  []  No reason given  []  Other:      [] Pt has future appointments scheduled, no follow up needed  [] Pt requests to be on hold.    Reason:   If > 2 weeks please discuss with therapist.  [] Therapist to call pt for follow up  [] [place] to call to re-schedule      Comments:   JOHNNA re: no show and no additional appts scheduled; requested pt call back to schedule    Signature: Electronically signed by Denia Deal PTA on 25 at 9:34 AM EDT

## 2025-07-17 ENCOUNTER — TRANSCRIBE ORDERS (OUTPATIENT)
Dept: ADMINISTRATIVE | Age: 30
End: 2025-07-17

## 2025-07-17 DIAGNOSIS — S33.5XXA LUMBAR SPRAIN, INITIAL ENCOUNTER: Primary | ICD-10-CM

## 2025-07-18 ENCOUNTER — HOSPITAL ENCOUNTER (OUTPATIENT)
Dept: CT IMAGING | Age: 30
Discharge: HOME | End: 2025-07-20
Payer: COMMERCIAL

## 2025-07-18 DIAGNOSIS — S33.5XXA LUMBAR SPRAIN, INITIAL ENCOUNTER: ICD-10-CM

## 2025-07-18 PROCEDURE — 72131 CT LUMBAR SPINE W/O DYE: CPT

## 2025-07-21 ENCOUNTER — INITIAL CONSULT (OUTPATIENT)
Age: 30
End: 2025-07-21
Payer: COMMERCIAL

## 2025-07-21 ENCOUNTER — HOSPITAL ENCOUNTER (OUTPATIENT)
Dept: NEUROLOGY | Age: 30
Discharge: HOME | End: 2025-07-21
Payer: COMMERCIAL

## 2025-07-21 ENCOUNTER — HOSPITAL ENCOUNTER (OUTPATIENT)
Dept: PHYSICAL THERAPY | Age: 30
Setting detail: THERAPIES SERIES
Discharge: HOME | End: 2025-07-21
Payer: COMMERCIAL

## 2025-07-21 VITALS
HEART RATE: 99 BPM | DIASTOLIC BLOOD PRESSURE: 70 MMHG | BODY MASS INDEX: 21.34 KG/M2 | TEMPERATURE: 99 F | SYSTOLIC BLOOD PRESSURE: 122 MMHG | OXYGEN SATURATION: 99 % | HEIGHT: 64 IN | WEIGHT: 125 LBS

## 2025-07-21 DIAGNOSIS — M79.604 RIGHT LEG PAIN: Primary | ICD-10-CM

## 2025-07-21 DIAGNOSIS — M54.16 LUMBAR RADICULOPATHY: ICD-10-CM

## 2025-07-21 DIAGNOSIS — G83.4 CAUDA EQUINA COMPRESSION (HCC): Primary | ICD-10-CM

## 2025-07-21 PROCEDURE — 99203 OFFICE O/P NEW LOW 30 MIN: CPT | Performed by: PAIN MEDICINE

## 2025-07-21 PROCEDURE — 95911 NRV CNDJ TEST 9-10 STUDIES: CPT

## 2025-07-21 PROCEDURE — 97110 THERAPEUTIC EXERCISES: CPT

## 2025-07-21 PROCEDURE — 95886 MUSC TEST DONE W/N TEST COMP: CPT

## 2025-07-21 RX ORDER — PREGABALIN 50 MG/1
50 CAPSULE ORAL 3 TIMES DAILY
Qty: 84 CAPSULE | Refills: 0 | Status: SHIPPED | OUTPATIENT
Start: 2025-07-21 | End: 2025-08-18

## 2025-07-21 ASSESSMENT — ENCOUNTER SYMPTOMS
RESPIRATORY NEGATIVE: 1
EYES NEGATIVE: 1
GASTROINTESTINAL NEGATIVE: 1
ALLERGIC/IMMUNOLOGIC NEGATIVE: 1

## 2025-07-21 ASSESSMENT — PAIN SCALES - GENERAL: PAINLEVEL_OUTOF10: 8

## 2025-07-21 ASSESSMENT — PAIN DESCRIPTION - DESCRIPTORS: DESCRIPTORS: SHOOTING;TINGLING;NUMBNESS

## 2025-07-21 ASSESSMENT — PAIN DESCRIPTION - ORIENTATION: ORIENTATION: RIGHT;LOWER

## 2025-07-21 ASSESSMENT — PAIN DESCRIPTION - LOCATION: LOCATION: BACK;HIP

## 2025-07-21 NOTE — PROGRESS NOTES
[facility] [id number] [place] [address]  Phone: [phone]    Date: 2025  Patient: Lenore Bernstein  : 1995   Confirmed: Yes  MRN: [id number]  Referring Provider: Jose Landa DO    Medical Diagnosis: Sprain of ligaments of thoracic spine, initial encounter [S23.3XXA]  Sprain of ligaments of lumbar spine, initial encounter [S33.5XXA]       Treatment Diagnosis: pain, decreased lumbar/thoracic AROM, RLE strength, numbness, flexibility, R SLS, lumbar joint mobility, TTP R lumbar paraspinals, piriformis.    Visit Information:  Insurance: Payor: [employer] / Plan: [employer] / Product Type: *No Product type* /   PT Visit Information  Total # of Visits Approved: 12  Total # of Visits to Date: 2  Plan of Care/Certification Expiration Date: 08/15/25  No Show: 1  Progress Note Due Date: 08/15/25  Canceled Appointment: 0  Progress Note Counter:     Subjective Information:  Subjective: Pt reports \"lightening bolt\" type pain that has become more consistent going down the Rt leg when walking. Most of the pain is on the right side of the back. Pt says she has been habing progressively worse bowel and bladder incontinence.  HEP Compliance:  [x] Good [] Fair [] Poor [] Reports not doing due to:    Pain Screening  Patient Currently in Pain: Yes  Pain Assessment: 0-10  Pain Level: 8  Pain Location: Back, Hip  Pain Orientation: Right, Lower  Pain Descriptors: Shooting, Tingling, Numbness (\"lightenting bolt.\")    Treatment:  Exercises:  Exercises  Exercise 1: LTR 5\"x10  Exercise 2: Piriformis stretch pull 30\"x3 B  Exercise 4: Hip add w/ ball x 10 -5\" (notes increased pain down the RLE)  Exercise 6: TA isometrics x 10 x 5\" (seated)  Exercise 8: HS stretch x 3 x 20-30\"  Exercise 10: Hip abd x 10 -5\" YTB  Exercise 11: BKFO x 10 (5-10\")  Exercise 19: **Green wedge used for lower back comfort**  Exercise 20: HEP:hip add, hip abd, BKFO, seated TA isometric     *Indicates exercise,

## 2025-07-21 NOTE — PROCEDURES
Electromyography (EMG)/Nerve conduction studies (NCS) Report: Lower Extremity    Name: Lenore Bernstein   YOB: 1995  Date of Service: 7/21/2025   Provider: uJly Gooden MD        INDICATIONS:  Lenore Bernstein is a 30 y.o. female who presents for electrodiagnostic evaluation for lumbar radiculopathy by request of Timothy BETANCOURT. Her main symptom for evaluation is right leg pain. Both limbs are necessary to examine in order to evaluate for any evidence of systemic disease as well as establish normal baseline values from which to compare any abnormal unilateral findings. The study is explained and verbal consent to proceed is obtained.     NERVE CONDUCTION STUDIES:    Sensory nerve conduction studies: Bilateral sural and superficial peroneal sensory nerve conduction studies demonstrate normal peak latencies and amplitudes. Bilateral lower limb temperatures are 30 degrees Celsius.     Motor nerve conduction studies: Bilateral peroneal motor nerve conduction studies with pickup over the extensor digitorum brevis demonstrate normal distal latencies and amplitudes.  Conduction velocities in bilateral legs are normal.  Bilateral tibial motor nerve conduction studies with pickup over the abductor hallucis demonstrate normal distal latencies and amplitudes.  Conduction velocity in the right leg is normal.    F waves are not prolonged in bilateral common peroneal and bilateral tibial studies.     H reflex: Bilateral H reflexes are symmetric and not prolonged.    ELECTROMYOGRAPHY: A disposable monopolar needle is used to evaluate bilateral vastus medialis, tibialis anterior, extensor hallucis longus, peroneus longus, medial gastrocnemius, and lateral gastrocnemius. All of the muscles sampled are free of any increased insertional activity or any abnormal spontaneous activity. Motor unit recruitment is unremarkable. Bilateral low lumbar paraspinal muscle sampling is free of any increased insertional activity

## 2025-07-21 NOTE — PROGRESS NOTES
History of Present Illness     Patient Identification  Lenore Bernstein is a 30 y.o. female.    Patient information was obtained from patient.      Chief Complaint   Chief Complaint   Patient presents with    Back Pain    Leg Pain     right     Lenore Bernstein is a 30 y.o. 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 She states that she she heard a pop and then started having pain down the back and to her legs. Patient presents with complaint of low back pain. This is a result of A WRI when picking up a patient at work on on 6/25 2025. Onset of pain was 6/25/2025  and has been gradually worsening since. The pain is located in diffuse lower back, described as sharp and Lightening bolts down her Legs and rated as severe and was a 4 when it started but now an 8/10 / 10, with radiation down Right leg back and outside right thigh crosses her knee and goes to her toes with numbness in her foot, cannot feel it.. Symptoms include Not feeling her Bladder,and it is getting distended and difficulty initiating Urine. The patient also denied complains of fever, dysuria, weight loss, history of cancer, history of osteoporosis, She is on Prednisone taper still has 2 days. The patient denies weakness, dysuria, hematuria. The patient denies other injuries. Care prior to arrival consisted of acetaminophen, ice, heat, and PT and streching with no relief.    CT LS: FINDINGS:07/18/25 14:48 EDT  No evidence of fracture or malalignment involving the lumbar spine.  No lytic  or blastic bone lesion.  No osseous central canal stenosis.  There is minimal  right neural foraminal narrowing at L5/S1 due to minimal marginal  osteophytosis at this level.  Mild to moderate facet arthropathy is present  at L5/S1 on the right.  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

## 2025-07-22 ENCOUNTER — INITIAL CONSULT (OUTPATIENT)
Age: 30
End: 2025-07-22
Payer: MEDICARE

## 2025-07-22 VITALS
WEIGHT: 125 LBS | DIASTOLIC BLOOD PRESSURE: 68 MMHG | HEIGHT: 64 IN | SYSTOLIC BLOOD PRESSURE: 118 MMHG | BODY MASS INDEX: 21.34 KG/M2

## 2025-07-22 DIAGNOSIS — E11.65 UNCONTROLLED TYPE 2 DIABETES MELLITUS WITH HYPERGLYCEMIA (HCC): ICD-10-CM

## 2025-07-22 DIAGNOSIS — I78.0 OSLER-WEBER-RENDU DISEASE: ICD-10-CM

## 2025-07-22 DIAGNOSIS — M51.27 HERNIATED NUCLEUS PULPOSUS, L5-S1, RIGHT: Primary | ICD-10-CM

## 2025-07-22 PROCEDURE — 99204 OFFICE O/P NEW MOD 45 MIN: CPT

## 2025-07-22 PROCEDURE — 3046F HEMOGLOBIN A1C LEVEL >9.0%: CPT

## 2025-07-22 PROCEDURE — 99203 OFFICE O/P NEW LOW 30 MIN: CPT

## 2025-07-22 PROCEDURE — 2022F DILAT RTA XM EVC RTNOPTHY: CPT

## 2025-07-22 PROCEDURE — 1036F TOBACCO NON-USER: CPT

## 2025-07-22 PROCEDURE — G8420 CALC BMI NORM PARAMETERS: HCPCS

## 2025-07-22 PROCEDURE — G8427 DOCREV CUR MEDS BY ELIG CLIN: HCPCS

## 2025-07-22 ASSESSMENT — ENCOUNTER SYMPTOMS
BACK PAIN: 1
DIARRHEA: 0
SHORTNESS OF BREATH: 0
CONSTIPATION: 0
NAUSEA: 0

## 2025-07-22 NOTE — PROGRESS NOTES
back and started having radiating right leg pain to her foot.  Numbness in foot.     Pain is getting progressively worse.  Radiating down right lateral thigh into posterior calf.  Additionally having weakness in right leg.  Difficulty controlling bladder. Does not feel the urge to go to the bathroom.  Feels bladder become distended, is still able to hold it.  Had 1 accident a week ago but has not had any since.    Recently seen by Dr. Hutchinson pain management.  Has started her on Lyrica.  She has CT myelogram lumbar spine ordered.  Unable to obtain MRI lumbar spine.  Had AVM of the lung treated with microscopic beats that are not MRI compatible.    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  Will obtain imaging and have patient follow-up.  In the meantime recommend continued conservative treatments.  Instructed if pain, weakness, incontinence worsens she is to return to ER.    Plan:  CT myelogram lumbar spine  Patient to return to see Dr. Thorpe in after imaging is obtained    MD Rene MC PA-C  Patient seen by both myself and Dr. Thorpe today

## 2025-07-23 ENCOUNTER — OFFICE VISIT (OUTPATIENT)
Age: 30
End: 2025-07-23

## 2025-07-23 ENCOUNTER — TELEPHONE (OUTPATIENT)
Age: 30
End: 2025-07-23

## 2025-07-23 VITALS
HEART RATE: 81 BPM | OXYGEN SATURATION: 99 % | HEIGHT: 64 IN | DIASTOLIC BLOOD PRESSURE: 74 MMHG | BODY MASS INDEX: 21.34 KG/M2 | WEIGHT: 125 LBS | SYSTOLIC BLOOD PRESSURE: 98 MMHG | RESPIRATION RATE: 18 BRPM

## 2025-07-23 DIAGNOSIS — M54.16 LUMBAR RADICULOPATHY: ICD-10-CM

## 2025-07-23 DIAGNOSIS — M51.27 HERNIATED NUCLEUS PULPOSUS, L5-S1, RIGHT: Primary | ICD-10-CM

## 2025-07-23 DIAGNOSIS — G83.4 CAUDA EQUINA COMPRESSION (HCC): Primary | ICD-10-CM

## 2025-07-23 DIAGNOSIS — F41.1 GENERALIZED ANXIETY DISORDER: ICD-10-CM

## 2025-07-23 ASSESSMENT — ENCOUNTER SYMPTOMS
BACK PAIN: 1
RESPIRATORY NEGATIVE: 1
NAUSEA: 0
CONSTIPATION: 1
VOMITING: 0
ALLERGIC/IMMUNOLOGIC NEGATIVE: 1
EYES NEGATIVE: 1
SHORTNESS OF BREATH: 0

## 2025-07-23 NOTE — PROGRESS NOTES
VASCULAR MEDICINE AND INTERVENTIONAL RADIOLOGY DEPARTMENT:     Chief Complaint   Patient presents with    New Patient     Ct myelogram     HPI: Lenore Bernstein, a female of 30 y.o. came to the office 7/23/2025, referred by Dr. Hutchinson, for CT myelogram.   Patient reports she was at work at the end of June this year and felt a \"pop\" then started having pain down the back of her right leg, back and outside of her right thigh. States it is a sharp, consistent pain, worse with walking. When she walks the pain shoots down the lateral aspect of her right thigh all the way to her toes. She also endorses toe and ankle tingling. States about a week after her injury she also started to notice difficulty with urination/bladder control. States she woke up at night wet approximated 2 weeks ago, none since. She additionally reports no urge to urinate. States she could go all day without urinating without the urge and notice a distended lower belly. States she still is able to urinate, but has a prolonged time on the toilet prior to urinating. She denies bladder issues prior to her injury. She reports longstanding history of chronic constipation, takes miralax daily and has a bowel movement 1-2 times per week. She denies any change in her bowel habits since her injury. She has been seen by pain management and neurosurgery, both requesting CT myelogram for further evaluation.   She reports history of Osler-Weber-rendu disease with history of multiple AVMs including \"beads\" in her lung at [facility] about 3 years ago that she was told by [facility] are not compatible with MRI. She follows with pulmonary, Dr. Churchill for this, last seen a few years ago when she was hospitalized for lung AVM. She also follows with the Vascular anomalies clinic at [facility]. States there is no adult vascular anomalies clinic in [place] for her to see so she has continued to follow with them for now.  Denies chest pain.   Denies dyspnea.   Denies

## 2025-07-23 NOTE — TELEPHONE ENCOUNTER
Called patient.  She is unable to get CT myelogram for another 2 to 3 weeks because Dr. Roberts at interventional radiology is out of town.    Her symptoms remain unchanged.  She continues to have back and R leg pain/weakness.     Will work with staff on alternative options to get her sooner imaging.  Educated patient on red flag signs such as bowel/bladder incontinence, significant pain/weakness in the lower extremities, saddle anesthesia.  She understands to report to the ER if she experiences any symptoms.    Will notify her if anything changes and we can get sooner imaging.  In the meantime we will plan to have her see Dr. Church when he returns from out of town.    Rene Guo PA-C

## 2025-07-23 NOTE — TELEPHONE ENCOUNTER
Recent Visits  Date Type Provider Dept   02/24/25 Office Visit Eric Aguilera MD Do [facility]   Showing recent visits within past 180 days and meeting all other requirements  Future Appointments  No visits were found meeting these conditions.  Showing future appointments within next 90 days and meeting all other requirements

## 2025-07-24 ENCOUNTER — HOSPITAL ENCOUNTER (OUTPATIENT)
Dept: PHYSICAL THERAPY | Age: 30
Setting detail: THERAPIES SERIES
Discharge: HOME | End: 2025-07-24
Payer: COMMERCIAL

## 2025-07-24 ENCOUNTER — CLINICAL DOCUMENTATION (OUTPATIENT)
Age: 30
End: 2025-07-24

## 2025-07-24 DIAGNOSIS — M51.27 HERNIATED NUCLEUS PULPOSUS, L5-S1, RIGHT: Primary | ICD-10-CM

## 2025-07-24 RX ORDER — OXYCODONE HYDROCHLORIDE 5 MG/1
5 TABLET ORAL EVERY 6 HOURS PRN
Qty: 28 TABLET | Refills: 0 | Status: SHIPPED | OUTPATIENT
Start: 2025-07-24 | End: 2025-07-30 | Stop reason: SDUPTHER

## 2025-07-24 RX ORDER — CITALOPRAM 20 MG/1
20 TABLET ORAL DAILY
Qty: 90 TABLET | Refills: 0 | Status: SHIPPED | OUTPATIENT
Start: 2025-07-24

## 2025-07-24 NOTE — PROGRESS NOTES
Spoke to patient this morning.  Symptoms remain unchanged.  Having moderate to severe low back and right leg pain radiating down to foot.  Weakness in the right leg.  Still able to ambulate.  Denying any current bowel or bladder incontinence although did have 1 episode of incontinence in the past which has resolved.  Denying any saddle anesthesia.    Patient has AVM beats that are not MRI compatible.  Currently she is waiting to be scheduled with Dr. Roberts when he returns from out of town for CT myelogram lumbar spine.    I have educated patient of red flag signs to look out for.  If these present she is to report to the ER.  I have offered her the option to go outside of our system for a sooner CT myelogram imaging study and further workup.  At this time she is declining and would like to continue conservative treatments and await for Dr. Roberts to return.  I will give her 1 week supply of oxycodone 5 mg in the meantime before she is able to set up appointment with Dr. Gooden and pain management.  She recently saw Dr. Hutchinson who started her on Lyrica.  Patient understands and agrees with this plan.    Rene Guo PA-C    
no chest pain, no cough, and no shortness of breath.

## 2025-07-24 NOTE — PROGRESS NOTES
Therapy                            Cancellation/No-show Note      Date: 2025  Patient: Lenore Bernstein (30 y.o. female)  : 1995  MRN:  [id number]  Referring Physician: Jose Landa DO    Medical Diagnosis: Sprain of ligaments of thoracic spine, initial encounter [S23.3XXA]  Sprain of ligaments of lumbar spine, initial encounter [S33.5XXA]      Visit Information:  Visits to Date 2   No Show/Cancelled Appts:       For today's appointment patient:  []  Cancelled  []  Rescheduled appointment  [x]  No-show   [x]  Called pt to remind of next appointment     Reason given by patient:  []  Patient ill  []  Conflicting appointment  []  No transportation    []  Conflict with work  []  No reason given  []  Other:      [x] Pt has future appointments scheduled, no follow up needed  [] Pt requests to be on hold.    Reason:   If > 2 weeks please discuss with therapist.  [] Therapist to call pt for follow up  [] [place] to call to re-schedule      Comments:       Signature: Electronically signed by Jenaro Beaver PTA on 25 at 8:18 AM EDT

## 2025-07-25 ENCOUNTER — HOSPITAL ENCOUNTER (EMERGENCY)
Age: 30
Discharge: LEFT BEFORE DISCHARGE | End: 2025-07-25
Attending: EMERGENCY MEDICINE
Payer: COMMERCIAL

## 2025-07-25 ENCOUNTER — CLINICAL DOCUMENTATION (OUTPATIENT)
Dept: NEUROSURGERY | Age: 30
End: 2025-07-25

## 2025-07-25 ENCOUNTER — TELEPHONE (OUTPATIENT)
Age: 30
End: 2025-07-25

## 2025-07-25 VITALS
TEMPERATURE: 97.7 F | OXYGEN SATURATION: 99 % | HEIGHT: 64 IN | SYSTOLIC BLOOD PRESSURE: 103 MMHG | HEART RATE: 80 BPM | BODY MASS INDEX: 21.68 KG/M2 | DIASTOLIC BLOOD PRESSURE: 75 MMHG | WEIGHT: 127 LBS | RESPIRATION RATE: 20 BRPM

## 2025-07-25 DIAGNOSIS — R33.9 URINARY RETENTION: ICD-10-CM

## 2025-07-25 DIAGNOSIS — M54.50 LOW BACK PAIN, UNSPECIFIED BACK PAIN LATERALITY, UNSPECIFIED CHRONICITY, UNSPECIFIED WHETHER SCIATICA PRESENT: Primary | ICD-10-CM

## 2025-07-25 DIAGNOSIS — R29.898 WEAKNESS OF RIGHT LOWER EXTREMITY: ICD-10-CM

## 2025-07-25 LAB
ALBUMIN SERPL-MCNC: 4.7 G/DL (ref 3.5–4.6)
ALP SERPL-CCNC: 68 U/L (ref 40–130)
ALT SERPL-CCNC: 16 U/L (ref 0–33)
ANION GAP SERPL CALCULATED.3IONS-SCNC: 12 MEQ/L (ref 9–15)
APTT PPP: 31.2 SEC (ref 24.4–36.8)
AST SERPL-CCNC: 23 U/L (ref 0–35)
BASOPHILS # BLD: 0 K/UL (ref 0–0.2)
BASOPHILS NFR BLD: 0.3 %
BILIRUB SERPL-MCNC: 0.6 MG/DL (ref 0.2–0.7)
BILIRUB UR QL STRIP: NEGATIVE
BUN SERPL-MCNC: 11 MG/DL (ref 6–20)
CALCIUM SERPL-MCNC: 9.1 MG/DL (ref 8.5–9.9)
CHLORIDE SERPL-SCNC: 98 MEQ/L (ref 95–107)
CLARITY UR: CLEAR
CO2 SERPL-SCNC: 24 MEQ/L (ref 20–31)
COLOR UR: YELLOW
CREAT SERPL-MCNC: 0.75 MG/DL (ref 0.5–0.9)
CRP SERPL HS-MCNC: <3 MG/L (ref 0–5)
EOSINOPHIL # BLD: 0 K/UL (ref 0–0.7)
EOSINOPHIL NFR BLD: 0.4 %
ERYTHROCYTE [DISTWIDTH] IN BLOOD BY AUTOMATED COUNT: 11.3 % (ref 11.5–14.5)
ERYTHROCYTE [SEDIMENTATION RATE] IN BLOOD BY WESTERGREN METHOD: 3 MM (ref 0–20)
GLOBULIN SER CALC-MCNC: 3.2 G/DL (ref 2.3–3.5)
GLUCOSE SERPL-MCNC: 94 MG/DL (ref 70–99)
GLUCOSE UR STRIP-MCNC: NEGATIVE MG/DL
HCG SERPL QL: NEGATIVE
HCT VFR BLD AUTO: 42 % (ref 37–47)
HGB BLD-MCNC: 14.7 G/DL (ref 12–16)
HGB UR QL STRIP: NEGATIVE
INR PPP: 1.1
KETONES UR STRIP-MCNC: ABNORMAL MG/DL
LEUKOCYTE ESTERASE UR QL STRIP: NEGATIVE
LYMPHOCYTES # BLD: 1.5 K/UL (ref 1–4.8)
LYMPHOCYTES NFR BLD: 21.8 %
MAGNESIUM SERPL-MCNC: 2 MG/DL (ref 1.7–2.4)
MCH RBC QN AUTO: 32.3 PG (ref 27–31.3)
MCHC RBC AUTO-ENTMCNC: 35 % (ref 33–37)
MCV RBC AUTO: 92.3 FL (ref 79.4–94.8)
MONOCYTES # BLD: 0.5 K/UL (ref 0.2–0.8)
MONOCYTES NFR BLD: 6.8 %
NEUTROPHILS # BLD: 4.8 K/UL (ref 1.4–6.5)
NEUTS SEG NFR BLD: 70.6 %
NITRITE UR QL STRIP: NEGATIVE
PH UR STRIP: 5.5 [PH] (ref 5–9)
PLATELET # BLD AUTO: 276 K/UL (ref 130–400)
POTASSIUM SERPL-SCNC: 3.4 MEQ/L (ref 3.4–4.9)
PROT SERPL-MCNC: 7.9 G/DL (ref 6.3–8)
PROT UR STRIP-MCNC: NEGATIVE MG/DL
PROTHROMBIN TIME: 14.8 SEC (ref 12.3–14.9)
RBC # BLD AUTO: 4.55 M/UL (ref 4.2–5.4)
SODIUM SERPL-SCNC: 134 MEQ/L (ref 135–144)
SP GR UR STRIP: 1.04 (ref 1–1.03)
URINE REFLEX TO CULTURE: ABNORMAL
UROBILINOGEN UR STRIP-ACNC: 0.2 E.U./DL
WBC # BLD AUTO: 6.8 K/UL (ref 4.8–10.8)

## 2025-07-25 PROCEDURE — 80053 COMPREHEN METABOLIC PANEL: CPT

## 2025-07-25 PROCEDURE — 83735 ASSAY OF MAGNESIUM: CPT

## 2025-07-25 PROCEDURE — 6360000002 HC RX W HCPCS: Performed by: STUDENT IN AN ORGANIZED HEALTH CARE EDUCATION/TRAINING PROGRAM

## 2025-07-25 PROCEDURE — 96375 TX/PRO/DX INJ NEW DRUG ADDON: CPT

## 2025-07-25 PROCEDURE — 85025 COMPLETE CBC W/AUTO DIFF WBC: CPT

## 2025-07-25 PROCEDURE — 81003 URINALYSIS AUTO W/O SCOPE: CPT

## 2025-07-25 PROCEDURE — 86140 C-REACTIVE PROTEIN: CPT

## 2025-07-25 PROCEDURE — 85730 THROMBOPLASTIN TIME PARTIAL: CPT

## 2025-07-25 PROCEDURE — 6360000002 HC RX W HCPCS: Performed by: EMERGENCY MEDICINE

## 2025-07-25 PROCEDURE — 85610 PROTHROMBIN TIME: CPT

## 2025-07-25 PROCEDURE — 84703 CHORIONIC GONADOTROPIN ASSAY: CPT

## 2025-07-25 PROCEDURE — 96374 THER/PROPH/DIAG INJ IV PUSH: CPT

## 2025-07-25 PROCEDURE — 96376 TX/PRO/DX INJ SAME DRUG ADON: CPT

## 2025-07-25 PROCEDURE — 99284 EMERGENCY DEPT VISIT MOD MDM: CPT

## 2025-07-25 PROCEDURE — 85652 RBC SED RATE AUTOMATED: CPT

## 2025-07-25 RX ORDER — MORPHINE SULFATE 4 MG/ML
4 INJECTION, SOLUTION INTRAMUSCULAR; INTRAVENOUS ONCE
Status: COMPLETED | OUTPATIENT
Start: 2025-07-25 | End: 2025-07-25

## 2025-07-25 RX ORDER — ONDANSETRON 2 MG/ML
4 INJECTION INTRAMUSCULAR; INTRAVENOUS ONCE
Status: COMPLETED | OUTPATIENT
Start: 2025-07-25 | End: 2025-07-25

## 2025-07-25 RX ADMIN — MORPHINE SULFATE 4 MG: 4 INJECTION, SOLUTION INTRAMUSCULAR; INTRAVENOUS at 15:02

## 2025-07-25 RX ADMIN — MORPHINE SULFATE 4 MG: 4 INJECTION, SOLUTION INTRAMUSCULAR; INTRAVENOUS at 19:59

## 2025-07-25 RX ADMIN — ONDANSETRON 4 MG: 2 INJECTION, SOLUTION INTRAMUSCULAR; INTRAVENOUS at 15:02

## 2025-07-25 ASSESSMENT — ENCOUNTER SYMPTOMS: BACK PAIN: 1

## 2025-07-25 ASSESSMENT — LIFESTYLE VARIABLES
HOW OFTEN DO YOU HAVE A DRINK CONTAINING ALCOHOL: NEVER
HOW MANY STANDARD DRINKS CONTAINING ALCOHOL DO YOU HAVE ON A TYPICAL DAY: PATIENT DOES NOT DRINK

## 2025-07-25 ASSESSMENT — PAIN DESCRIPTION - LOCATION
LOCATION: BACK;LEG
LOCATION: LEG;BACK
LOCATION: BACK

## 2025-07-25 ASSESSMENT — PAIN DESCRIPTION - ORIENTATION
ORIENTATION: RIGHT
ORIENTATION: RIGHT;LOWER

## 2025-07-25 ASSESSMENT — PAIN DESCRIPTION - FREQUENCY: FREQUENCY: CONTINUOUS

## 2025-07-25 ASSESSMENT — PAIN SCALES - GENERAL
PAINLEVEL_OUTOF10: 8

## 2025-07-25 ASSESSMENT — PAIN - FUNCTIONAL ASSESSMENT: PAIN_FUNCTIONAL_ASSESSMENT: 0-10

## 2025-07-25 ASSESSMENT — PAIN DESCRIPTION - DESCRIPTORS
DESCRIPTORS: NUMBNESS;TINGLING
DESCRIPTORS: SHARP

## 2025-07-25 ASSESSMENT — PAIN DESCRIPTION - PAIN TYPE: TYPE: ACUTE PAIN

## 2025-07-25 NOTE — PROGRESS NOTES
7/25/2025 [facility] emergency room for inability to urinate.  Patient did not have urinary obstruction or acute urinary retention.  She had low urinary output.  Munoz catheter was placed.  Follow-up with urology.    7/21/2023 brief Op Note - CHE Bright DO - 07/21/2023 1:03 AM EDT [facility]  SURGERY/PROCEDURE(S): Right bronchial artery embolization  ANESTHESIA: Procedural Sedation   FINDINGS: Dominant right bronchial artery as identified on CT scan was found with several areas of abnormal vascularity in the parenchyma of the the lung. Embolized to near stasis with 500-700um beads. After embolization the previously noted abnormal vascularity was no longer opacified with DSA.   IMPLANTS: * No implants in log *     Unable to document MRI compatibility of embolization beads used for the procedure in the electronic medical record    PMH Iron Deficiency Anemia, anxiety/depression, epilepsy, DM2, HTN, Systemic Lupus Erythematosus, Postural Orthostatic Tachycardia Syndrome ,Hereditary Hemorrhagic Telangiectasia, Rendu-osler-weber  .    7/22/2025 neurosurgery spine evaluation back right lower extremity pain weakness.  MRI scan lumbar spine ordered.  History from the patient is that she is unable to have MRI studies secondary to the procedure of 7/21/2023 with beads placed in her lung.    Patient would require CT myelogram and lieu of MRI lumbar study.    Interventional radiologist, Dr. Flores, is not available at [facility] to proceed with CT myelogram.  Neurosurgery spine advise transfer to  tertiary care center to consider proceeding with CT myelogram.   contacted by emergency room physician Dr. Bobby Mittal.  His notes document that  the  neurosurgeon thinks the patient's beads are actually MRI compatible and refused transfer of the patient.  Dr Eduardo Marquez radiology stated the patient does not have radioopaque foreign bodies-he states I (Dr. Bobby Mittal) need to talk to IR to see if they are MRI

## 2025-07-25 NOTE — ED PROVIDER NOTES
Patient is a 30-year-old female who presented to ED due to worsening back pain rating down her right lateral thigh into her posterior calf with weakness of the right leg.  She also has had paresthesias to her ankle and toes.  The pain worsens with ambulation but is consistent at rest.  She also had urinary incontinence which primarily brought her to the ED.  Patient was initially seen by ED colleague Dr. Pierre, please see his initial note for the HPI.  Patient needed a CT myelogram although we did not have IR here to do this.  Through chart review, patient had 500-700 um beads that were placed due to abnormal vascularity in the parenchyma of the lung on 7/21/2023 by IR at [facility].  It appeared at that time that there was concern for potential AVMs given patient's history of Osler doe syndrome.  My colleague spoke with a radiologist who was not sure if these beads are MRI compatible and thus that radiologist was reaching out to an IR physician to find this out although no callback was received for further info on this.  If the beads are MRI compatible, she can get an MRI done here.  If not, patient will need to be sent out to a tertiary center for a CT myelogram.  I did speak with Dr. Thorpe and I appreciated his assistance in patient's care.  At this time, we do not have CT myelogram capabilities at our facility.  That is the reason why we requested and recommended for patient to be sent to a tertiary center that can perform this imaging.  We do have an IR provider that can provide this imaging although my understanding is that this provider is not going to be present for the next 2 weeks.  When I spoke with patient about this at great length, she endorsed that she does not want to go to [facility] nor  as per her, she established care here at [facility] and wants all of her care done here.  She ultimately endorsed that she was going to sign herself AMA out of the ED due to this reason.  She endorsed

## 2025-07-25 NOTE — DISCHARGE INSTRUCTIONS
You were seen in the ER today due to concern for numbness that is increasing to your right leg and inability to urinate.  Previous team placed a Munoz catheter which we did removed to attempt urine voiding again    We were concerned given that in July 2023 you did have some beads that were placed in an artery in your lung.  Given this reason, we were not able to obtain an MRI as at this time it is uncertain if these are unsafe and noncompatible with MRI.  We did offer, to send you to [facility] or  for further care on this although you did not want this at this time.  Your lab workup otherwise here today was reassuring.  You have an appointment with Dr. Thorpe from neurosurgery this upcoming Tuesday at 8:30 AM, please attend this appointment.  If for some reason develop worsening symptoms such as loss of control of your bowel, complete inability to move either legs, please come back to the ED.    Of note you will need to follow-up with the surgeon that placed the beats to have a discussion with him and for further records on if you are to continue with MRI imaging.  At this time we do not have capabilities to do CT myelogram imaging which is the reason why we wanted to send you to a tertiary care center that has these capabilities.

## 2025-07-25 NOTE — ED TRIAGE NOTES
Pt c/o lower back pain that goes down the rt leg, pt reports numbness, pt states she didn't urinate for past 12 hours, pt denies bladder discomfort or urgency. Pt reports hx. Compressed disk and awaits sx. Pt a&ox4, skin w/d/pink. Pt has steady gait.

## 2025-07-25 NOTE — TELEPHONE ENCOUNTER
This patient of Dr Thorpe calling because she has not urinated in 12 hrs and is very distended in her abdomen   She does not see Dr till 8/14/25 patient is not sure what she should do   Please call patient to assist

## 2025-07-25 NOTE — ED PROVIDER NOTES
[facility] EMERGENCY DEPARTMENT  EMERGENCY DEPARTMENT ENCOUNTER      Pt Name: Lenore Bernstein  MRN: [id number]  Birthdate 1995  Date of evaluation: 7/25/2025  Provider: Sarthak Rosales MD  Note Started: 7/25/25 2:16 PM EDT    CHIEF COMPLAINT       Chief Complaint   Patient presents with    Back Pain     Pt c/o low back pain that goes down the rt leg, numbness         HISTORY OF PRESENT ILLNESS   (Location/Symptom, Timing/Onset, Context/Setting, Quality, Duration, Modifying Factors, Severity)  Note limiting factors.   Lenore Bernstein is a 30 y.o. female who presents to the emergency department via private vehicle.  States that recently she has had worsening back pain radiating down her right lateral thigh into her posterior calf with weakness of the right leg.  Sharp, consistent pain worsens with ambulation, twisting and bending.  Reports paresthesia to ankle and toes.  Had an episode of incontinence last week.  But now has progressed to reportedly feeling bladder distention and not urinating for greater than 12 hours.  Denies saddle anesthesia.  Established with pain management on Lyrica.  Neurosurgery concern for L4-5, L5-S1 distribution of pain in right lower extremity.  Chart review notes last month the patient was seen in the ER and had negative lumbar and T-spine CT, subsequently followed up in physical therapy, was in neurosurgery clinic and reporting she had tried NSAIDs and muscle relaxers without relief at that time complaining of back and right leg pain with weakness to the right extremity 4 out of 5 and decree sensation to light touch of the right lower extremity  HPI  Chart review notes history of anemia, anxiety, autoimmune disorder, diabetes, drug-seeking behavior, epilepsy, TBI, Rendu-osler-weber, AVM LUNG beads not MRI compatible on aspirin  Nursing Notes were reviewed.    REVIEW OF SYSTEMS    (2-9 systems for level 4, 10 or more for level 5)     Review of Systems   Genitourinary:  Positive for

## 2025-07-28 ENCOUNTER — TELEPHONE (OUTPATIENT)
Age: 30
End: 2025-07-28

## 2025-07-28 NOTE — TELEPHONE ENCOUNTER
Called patient this morning.  She is continuing to have back and right leg pain/weakness.  Tingling in the right lower extremity as well.  She thinks the LBP and RLE pain is improving with oxycodone.  No more urinary complaints since ER discharge. Able to go a little on her own.     Advised her that given she is still having severe LBP and RLE pain/weakness, as well as urinary symptoms, advanced imaging at tertiary care center is still our recommendations.    She would like to keep appointment tomorrow given that she is in less pain than before.  She would like to talk with Dr. Thorpe about further options at this appt.  If she has any other questions she will reach back out.    Rene Guo PA-C

## 2025-07-28 NOTE — PROGRESS NOTES
foreign bodies-he states I (Dr. Bobby Mittal) need to talk to IR to see if they are MRI compatible. He is having IR call us.  Munoz catheter was removed prior to discharge     5/2/2023 MR abdomen w and wo IV contrast [facility]  No MRI images have been obtained since the surgical procedure 7/21/2023.    Plan  Chest x-ray  CT chest  Consult IR for evaluation of MRI compatibility  Appointment [facility] Bright, DO 7/21/2023 beads in the left lung.  Assess for MRI compatibility  Consult interventional radiology Dr. Flores  Recheck 30

## 2025-07-29 ENCOUNTER — OFFICE VISIT (OUTPATIENT)
Age: 30
End: 2025-07-29
Payer: COMMERCIAL

## 2025-07-29 ENCOUNTER — HOSPITAL ENCOUNTER (OUTPATIENT)
Dept: PHYSICAL THERAPY | Age: 30
Setting detail: THERAPIES SERIES
Discharge: HOME | End: 2025-07-29
Payer: COMMERCIAL

## 2025-07-29 VITALS
WEIGHT: 127 LBS | HEIGHT: 64 IN | DIASTOLIC BLOOD PRESSURE: 64 MMHG | TEMPERATURE: 97.7 F | SYSTOLIC BLOOD PRESSURE: 102 MMHG | BODY MASS INDEX: 21.68 KG/M2

## 2025-07-29 DIAGNOSIS — I78.0 OSLER-WEBER-RENDU DISEASE: ICD-10-CM

## 2025-07-29 DIAGNOSIS — M51.27 HERNIATED NUCLEUS PULPOSUS, L5-S1, RIGHT: ICD-10-CM

## 2025-07-29 DIAGNOSIS — Q27.30 AVM (ARTERIOVENOUS MALFORMATION): ICD-10-CM

## 2025-07-29 DIAGNOSIS — M47.27 LUMBOSACRAL SPONDYLOSIS WITH RADICULOPATHY: Primary | ICD-10-CM

## 2025-07-29 DIAGNOSIS — T17.808A FOREIGN BODY IN LUNG, INITIAL ENCOUNTER: ICD-10-CM

## 2025-07-29 DIAGNOSIS — I78.0 HEREDITARY HEMORRHAGIC TELANGIECTASIA: ICD-10-CM

## 2025-07-29 PROBLEM — M51.26 LUMBAR DISC HERNIATION: Status: ACTIVE | Noted: 2025-07-29

## 2025-07-29 PROBLEM — M47.816 LUMBAR SPONDYLOSIS: Status: ACTIVE | Noted: 2025-07-29

## 2025-07-29 PROCEDURE — 1036F TOBACCO NON-USER: CPT | Performed by: NEUROLOGICAL SURGERY

## 2025-07-29 PROCEDURE — G8420 CALC BMI NORM PARAMETERS: HCPCS | Performed by: NEUROLOGICAL SURGERY

## 2025-07-29 PROCEDURE — 99214 OFFICE O/P EST MOD 30 MIN: CPT | Performed by: NEUROLOGICAL SURGERY

## 2025-07-29 PROCEDURE — G8427 DOCREV CUR MEDS BY ELIG CLIN: HCPCS | Performed by: NEUROLOGICAL SURGERY

## 2025-07-29 NOTE — PROGRESS NOTES
Therapy                            Cancellation/No-show Note      Date: 2025  Patient: Lenore Bernstein (30 y.o. female)  : 1995  MRN:  [id number]  Referring Physician: Jose Landa DO    Medical Diagnosis: Sprain of ligaments of thoracic spine, initial encounter [S23.3XXA]  Sprain of ligaments of lumbar spine, initial encounter [S33.5XXA]      Visit Information:  Visits to Date 2   No Show/Cancelled Appts: 3 / 0      For today's appointment patient:  []  Cancelled  []  Rescheduled appointment  [x]  No-show   [x]  Called pt to remind of next appointment     Reason given by patient:  []  Patient ill  [x]  Conflicting appointment  []  No transportation    []  Conflict with work  []  No reason given  []  Other:      [x] Pt has future appointments scheduled, no follow up needed  [] Pt requests to be on hold.    Reason:   If > 2 weeks please discuss with therapist.  [] Therapist to call pt for follow up  [] [place] to call to re-schedule      Comments:   Pt has an appt with Dr. Thorpe at 8:30am this date to follow up about lower back pain.     Signature: Electronically signed by Jenaro Beaver PTA on 25 at 9:08 AM EDT

## 2025-07-30 ENCOUNTER — PATIENT MESSAGE (OUTPATIENT)
Age: 30
End: 2025-07-30

## 2025-07-30 ENCOUNTER — PATIENT MESSAGE (OUTPATIENT)
Dept: PRIMARY CARE | Facility: CLINIC | Age: 30
End: 2025-07-30
Payer: MEDICARE

## 2025-07-30 DIAGNOSIS — M51.27 HERNIATED NUCLEUS PULPOSUS, L5-S1, RIGHT: ICD-10-CM

## 2025-07-30 DIAGNOSIS — F41.1 GENERALIZED ANXIETY DISORDER: ICD-10-CM

## 2025-07-30 RX ORDER — OXYCODONE HYDROCHLORIDE 5 MG/1
5 TABLET ORAL EVERY 6 HOURS PRN
Qty: 28 TABLET | Refills: 0 | Status: SHIPPED | OUTPATIENT
Start: 2025-07-30 | End: 2025-08-06

## 2025-07-30 NOTE — TELEPHONE ENCOUNTER
Requested Prescriptions     Pending Prescriptions Disp Refills    oxyCODONE (ROXICODONE) 5 MG immediate release tablet 28 tablet 0     Sig: Take 1 tablet by mouth every 6 hours as needed for Pain for up to 7 days. Intended supply: 7 days. Take lowest dose possible to manage pain Max Daily Amount: 20 mg       Patient last seen on:  07/29/2025    Date of last refill:  07/24/20025    Reason for request:  Carry over until appointment with IR    Request date for pharmacy pick-up:  07/31/2025    Next office visit date: 08/14/2025       Please approve or deny this request

## 2025-07-31 RX ORDER — CITALOPRAM 20 MG/1
20 TABLET ORAL DAILY
Qty: 90 TABLET | Refills: 0 | Status: CANCELLED | OUTPATIENT
Start: 2025-07-31

## 2025-07-31 NOTE — TELEPHONE ENCOUNTER
Recent Visits  Date Type Provider Dept   02/24/25 Office Visit Eric Aguilera MD Do [facility]   Showing recent visits within past 180 days and meeting all other requirements  Future Appointments  No visits were found meeting these conditions.  Showing future appointments within next 90 days and meeting all other requirements

## 2025-08-01 ENCOUNTER — HOSPITAL ENCOUNTER (OUTPATIENT)
Dept: GENERAL RADIOLOGY | Age: 30
End: 2025-08-01
Attending: NEUROLOGICAL SURGERY
Payer: COMMERCIAL

## 2025-08-01 ENCOUNTER — HOSPITAL ENCOUNTER (OUTPATIENT)
Dept: CT IMAGING | Age: 30
Discharge: HOME | End: 2025-08-01
Attending: NEUROLOGICAL SURGERY
Payer: COMMERCIAL

## 2025-08-01 DIAGNOSIS — T17.808A FOREIGN BODY IN LUNG, INITIAL ENCOUNTER: ICD-10-CM

## 2025-08-01 PROCEDURE — 71250 CT THORAX DX C-: CPT

## 2025-08-01 PROCEDURE — 71046 X-RAY EXAM CHEST 2 VIEWS: CPT

## 2025-08-01 RX ORDER — CITALOPRAM 20 MG/1
20 TABLET ORAL DAILY
Qty: 90 TABLET | Refills: 0 | Status: SHIPPED | OUTPATIENT
Start: 2025-08-01

## 2025-08-04 ENCOUNTER — TELEPHONE (OUTPATIENT)
Age: 30
End: 2025-08-04

## 2025-08-04 DIAGNOSIS — M54.16 LUMBAR RADICULOPATHY: Primary | ICD-10-CM

## 2025-08-05 ENCOUNTER — TELEPHONE (OUTPATIENT)
Age: 30
End: 2025-08-05

## 2025-08-05 DIAGNOSIS — M54.17 LUMBOSACRAL RADICULOPATHY: Primary | ICD-10-CM

## 2025-08-05 DIAGNOSIS — F40.240 CLAUSTROPHOBIA: ICD-10-CM

## 2025-08-11 ENCOUNTER — ANESTHESIA (OUTPATIENT)
Dept: MRI IMAGING | Age: 30
End: 2025-08-11
Payer: COMMERCIAL

## 2025-08-11 ENCOUNTER — HOSPITAL ENCOUNTER (OUTPATIENT)
Dept: MRI IMAGING | Age: 30
Discharge: HOME | End: 2025-08-13
Attending: NEUROLOGICAL SURGERY
Payer: COMMERCIAL

## 2025-08-11 ENCOUNTER — ANESTHESIA EVENT (OUTPATIENT)
Dept: MRI IMAGING | Age: 30
End: 2025-08-11
Payer: COMMERCIAL

## 2025-08-11 VITALS
HEIGHT: 63 IN | OXYGEN SATURATION: 99 % | HEART RATE: 79 BPM | SYSTOLIC BLOOD PRESSURE: 95 MMHG | WEIGHT: 125 LBS | DIASTOLIC BLOOD PRESSURE: 60 MMHG | RESPIRATION RATE: 17 BRPM | BODY MASS INDEX: 22.15 KG/M2 | TEMPERATURE: 98.5 F

## 2025-08-11 DIAGNOSIS — M54.17 LUMBOSACRAL RADICULOPATHY: ICD-10-CM

## 2025-08-11 DIAGNOSIS — F40.240 CLAUSTROPHOBIA: ICD-10-CM

## 2025-08-11 PROCEDURE — 3700000000 HC ANESTHESIA ATTENDED CARE

## 2025-08-11 PROCEDURE — 7100000010 HC PHASE II RECOVERY - FIRST 15 MIN

## 2025-08-11 PROCEDURE — 6360000002 HC RX W HCPCS

## 2025-08-11 PROCEDURE — 72148 MRI LUMBAR SPINE W/O DYE: CPT

## 2025-08-11 PROCEDURE — 7100000001 HC PACU RECOVERY - ADDTL 15 MIN

## 2025-08-11 PROCEDURE — 7100000011 HC PHASE II RECOVERY - ADDTL 15 MIN

## 2025-08-11 PROCEDURE — 3700000001 HC ADD 15 MINUTES (ANESTHESIA)

## 2025-08-11 PROCEDURE — 2580000003 HC RX 258: Performed by: ANESTHESIOLOGY

## 2025-08-11 PROCEDURE — 7100000000 HC PACU RECOVERY - FIRST 15 MIN

## 2025-08-11 RX ORDER — LABETALOL HYDROCHLORIDE 5 MG/ML
10 INJECTION, SOLUTION INTRAVENOUS
OUTPATIENT
Start: 2025-08-11

## 2025-08-11 RX ORDER — SODIUM CHLORIDE 0.9 % (FLUSH) 0.9 %
5-40 SYRINGE (ML) INJECTION EVERY 12 HOURS SCHEDULED
OUTPATIENT
Start: 2025-08-11

## 2025-08-11 RX ORDER — SODIUM CHLORIDE, SODIUM LACTATE, POTASSIUM CHLORIDE, CALCIUM CHLORIDE 600; 310; 30; 20 MG/100ML; MG/100ML; MG/100ML; MG/100ML
INJECTION, SOLUTION INTRAVENOUS CONTINUOUS
OUTPATIENT
Start: 2025-08-11

## 2025-08-11 RX ORDER — ONDANSETRON 2 MG/ML
4 INJECTION INTRAMUSCULAR; INTRAVENOUS
OUTPATIENT
Start: 2025-08-11

## 2025-08-11 RX ORDER — SODIUM CHLORIDE 9 MG/ML
INJECTION, SOLUTION INTRAVENOUS CONTINUOUS
Status: DISCONTINUED | OUTPATIENT
Start: 2025-08-11 | End: 2025-08-14 | Stop reason: HOSPADM

## 2025-08-11 RX ORDER — HYDRALAZINE HYDROCHLORIDE 20 MG/ML
10 INJECTION INTRAMUSCULAR; INTRAVENOUS
OUTPATIENT
Start: 2025-08-11

## 2025-08-11 RX ORDER — PROCHLORPERAZINE EDISYLATE 5 MG/ML
5 INJECTION INTRAMUSCULAR; INTRAVENOUS
OUTPATIENT
Start: 2025-08-11

## 2025-08-11 RX ORDER — DIPHENHYDRAMINE HYDROCHLORIDE 50 MG/ML
12.5 INJECTION, SOLUTION INTRAMUSCULAR; INTRAVENOUS
OUTPATIENT
Start: 2025-08-11

## 2025-08-11 RX ORDER — SODIUM CHLORIDE 9 MG/ML
INJECTION, SOLUTION INTRAVENOUS PRN
OUTPATIENT
Start: 2025-08-11

## 2025-08-11 RX ORDER — SODIUM CHLORIDE 0.9 % (FLUSH) 0.9 %
5-40 SYRINGE (ML) INJECTION PRN
OUTPATIENT
Start: 2025-08-11

## 2025-08-11 RX ORDER — PROPOFOL 10 MG/ML
INJECTION, EMULSION INTRAVENOUS
Status: DISCONTINUED | OUTPATIENT
Start: 2025-08-11 | End: 2025-08-11 | Stop reason: SDUPTHER

## 2025-08-11 RX ORDER — MIDAZOLAM HYDROCHLORIDE 1 MG/ML
INJECTION, SOLUTION INTRAMUSCULAR; INTRAVENOUS
Status: DISCONTINUED | OUTPATIENT
Start: 2025-08-11 | End: 2025-08-11 | Stop reason: SDUPTHER

## 2025-08-11 RX ADMIN — PROPOFOL 70 MG: 10 INJECTION, EMULSION INTRAVENOUS at 14:38

## 2025-08-11 RX ADMIN — MIDAZOLAM HYDROCHLORIDE 2 MG: 1 INJECTION, SOLUTION INTRAMUSCULAR; INTRAVENOUS at 14:28

## 2025-08-11 RX ADMIN — PROPOFOL 100 MCG/KG/MIN: 10 INJECTION, EMULSION INTRAVENOUS at 14:39

## 2025-08-11 RX ADMIN — SODIUM CHLORIDE: 0.9 INJECTION, SOLUTION INTRAVENOUS at 13:44

## 2025-08-11 ASSESSMENT — PAIN DESCRIPTION - LOCATION: LOCATION: BACK

## 2025-08-11 ASSESSMENT — PAIN DESCRIPTION - ORIENTATION: ORIENTATION: LOWER

## 2025-08-11 ASSESSMENT — PAIN SCALES - GENERAL: PAINLEVEL_OUTOF10: 7

## 2025-08-12 ENCOUNTER — OFFICE VISIT (OUTPATIENT)
Age: 30
End: 2025-08-12
Payer: MEDICARE

## 2025-08-12 VITALS
OXYGEN SATURATION: 99 % | HEIGHT: 63 IN | SYSTOLIC BLOOD PRESSURE: 112 MMHG | HEART RATE: 99 BPM | WEIGHT: 126 LBS | TEMPERATURE: 98.7 F | BODY MASS INDEX: 22.32 KG/M2 | DIASTOLIC BLOOD PRESSURE: 62 MMHG

## 2025-08-12 DIAGNOSIS — M32.9 SYSTEMIC LUPUS ERYTHEMATOSUS, UNSPECIFIED SLE TYPE, UNSPECIFIED ORGAN INVOLVEMENT STATUS (HCC): ICD-10-CM

## 2025-08-12 DIAGNOSIS — R56.9 SEIZURES (HCC): ICD-10-CM

## 2025-08-12 DIAGNOSIS — I47.10 SVT (SUPRAVENTRICULAR TACHYCARDIA): Primary | ICD-10-CM

## 2025-08-12 DIAGNOSIS — M51.27 HERNIATED NUCLEUS PULPOSUS, L5-S1, RIGHT: ICD-10-CM

## 2025-08-12 DIAGNOSIS — M54.17 LUMBOSACRAL RADICULOPATHY: ICD-10-CM

## 2025-08-12 DIAGNOSIS — Z13.220 SCREENING FOR LIPID DISORDERS: ICD-10-CM

## 2025-08-12 PROBLEM — F31.9 BIPOLAR 1 DISORDER (HCC): Status: RESOLVED | Noted: 2018-07-10 | Resolved: 2025-08-12

## 2025-08-12 PROCEDURE — 1036F TOBACCO NON-USER: CPT | Performed by: NURSE PRACTITIONER

## 2025-08-12 PROCEDURE — 99203 OFFICE O/P NEW LOW 30 MIN: CPT | Performed by: NURSE PRACTITIONER

## 2025-08-12 PROCEDURE — 99204 OFFICE O/P NEW MOD 45 MIN: CPT | Performed by: NURSE PRACTITIONER

## 2025-08-12 PROCEDURE — G8420 CALC BMI NORM PARAMETERS: HCPCS | Performed by: NURSE PRACTITIONER

## 2025-08-12 PROCEDURE — G8427 DOCREV CUR MEDS BY ELIG CLIN: HCPCS | Performed by: NURSE PRACTITIONER

## 2025-08-12 RX ORDER — HYDROCODONE BITARTRATE AND ACETAMINOPHEN 5; 325 MG/1; MG/1
1 TABLET ORAL EVERY 8 HOURS PRN
Qty: 21 TABLET | Refills: 0 | Status: SHIPPED | OUTPATIENT
Start: 2025-08-12 | End: 2025-08-19

## 2025-08-12 SDOH — ECONOMIC STABILITY: FOOD INSECURITY: WITHIN THE PAST 12 MONTHS, YOU WORRIED THAT YOUR FOOD WOULD RUN OUT BEFORE YOU GOT MONEY TO BUY MORE.: NEVER TRUE

## 2025-08-12 SDOH — ECONOMIC STABILITY: FOOD INSECURITY: WITHIN THE PAST 12 MONTHS, THE FOOD YOU BOUGHT JUST DIDN'T LAST AND YOU DIDN'T HAVE MONEY TO GET MORE.: NEVER TRUE

## 2025-08-12 ASSESSMENT — PATIENT HEALTH QUESTIONNAIRE - PHQ9
7. TROUBLE CONCENTRATING ON THINGS, SUCH AS READING THE NEWSPAPER OR WATCHING TELEVISION: NOT AT ALL
5. POOR APPETITE OR OVEREATING: NOT AT ALL
SUM OF ALL RESPONSES TO PHQ QUESTIONS 1-9: 4
2. FEELING DOWN, DEPRESSED OR HOPELESS: NOT AT ALL
SUM OF ALL RESPONSES TO PHQ QUESTIONS 1-9: 4
10. IF YOU CHECKED OFF ANY PROBLEMS, HOW DIFFICULT HAVE THESE PROBLEMS MADE IT FOR YOU TO DO YOUR WORK, TAKE CARE OF THINGS AT HOME, OR GET ALONG WITH OTHER PEOPLE: NOT DIFFICULT AT ALL
3. TROUBLE FALLING OR STAYING ASLEEP: MORE THAN HALF THE DAYS
8. MOVING OR SPEAKING SO SLOWLY THAT OTHER PEOPLE COULD HAVE NOTICED. OR THE OPPOSITE, BEING SO FIGETY OR RESTLESS THAT YOU HAVE BEEN MOVING AROUND A LOT MORE THAN USUAL: NOT AT ALL
1. LITTLE INTEREST OR PLEASURE IN DOING THINGS: NOT AT ALL
6. FEELING BAD ABOUT YOURSELF - OR THAT YOU ARE A FAILURE OR HAVE LET YOURSELF OR YOUR FAMILY DOWN: NOT AT ALL
SUM OF ALL RESPONSES TO PHQ QUESTIONS 1-9: 4
4. FEELING TIRED OR HAVING LITTLE ENERGY: MORE THAN HALF THE DAYS
SUM OF ALL RESPONSES TO PHQ QUESTIONS 1-9: 4
9. THOUGHTS THAT YOU WOULD BE BETTER OFF DEAD, OR OF HURTING YOURSELF: NOT AT ALL

## 2025-08-14 ENCOUNTER — OFFICE VISIT (OUTPATIENT)
Age: 30
End: 2025-08-14

## 2025-08-14 VITALS — WEIGHT: 126 LBS | BODY MASS INDEX: 22.32 KG/M2 | RESPIRATION RATE: 18 BRPM | HEIGHT: 63 IN

## 2025-08-14 DIAGNOSIS — M54.16 RIGHT LUMBAR RADICULOPATHY: ICD-10-CM

## 2025-08-14 DIAGNOSIS — I78.0 OSLER-WEBER-RENDU DISEASE: ICD-10-CM

## 2025-08-14 DIAGNOSIS — F40.240 CLAUSTROPHOBIA: ICD-10-CM

## 2025-08-14 DIAGNOSIS — Z87.898 H/O URINARY RETENTION: ICD-10-CM

## 2025-08-14 DIAGNOSIS — M32.9 SYSTEMIC LUPUS ERYTHEMATOSUS, UNSPECIFIED SLE TYPE, UNSPECIFIED ORGAN INVOLVEMENT STATUS (HCC): ICD-10-CM

## 2025-08-14 DIAGNOSIS — M47.816 LUMBAR SPONDYLOSIS: Primary | ICD-10-CM

## 2025-08-14 DIAGNOSIS — I78.0 HEREDITARY HEMORRHAGIC TELANGIECTASIA: ICD-10-CM

## 2025-08-15 LAB
6MAM UR QL: NOT DETECTED
7-AMINOCLONAZEPAM: NOT DETECTED
ALPHA-OH-ALPRAZOLAM: NOT DETECTED
ALPHA-OH-MIDAZOLAM, URINE: PRESENT
ALPRAZOLAM: NOT DETECTED
AMPHET UR QL SCN: NOT DETECTED
BARBITURATES: NEGATIVE
BENZOYLECGONINE: NEGATIVE
BUPRENORPHINE: PRESENT
CARISOPRODOL UR QL: NEGATIVE
CLONAZEPAM UR QL: NOT DETECTED
CODEINE: NOT DETECTED
CREAT UR-MCNC: 158.2 MG/DL (ref 20–400)
DIAZEPAM: NOT DETECTED
ETHYL GLUCURONIDE: NEGATIVE
FENTANYL UR QL: NOT DETECTED
GABAPENTIN: NOT DETECTED
HYDROCODONE UR QL: NOT DETECTED
HYDROMORPHONE: NOT DETECTED
LORAZEPAM UR QL: NOT DETECTED
MARIJUANA METABOLITE: NEGATIVE
MDA: NOT DETECTED
MDEA: NOT DETECTED
MDMA UR QL: NOT DETECTED
MEPERIDINE: NOT DETECTED
METHADONE: NEGATIVE
METHAMPHETAMINE: NOT DETECTED
METHYLPHENIDATE: NOT DETECTED
MIDAZOLAM UR QL SCN: PRESENT
MORPHINE: NOT DETECTED
NALOXONE: NOT DETECTED
NORBUPRENORPHINE, FREE: PRESENT
NORDIAZEPAM: NOT DETECTED
NORFENTANYL: NOT DETECTED
NORHYDROCODONE, URINE: NOT DETECTED
NOROXYCODONE: NOT DETECTED
NOROXYMORPHONE, URINE: NOT DETECTED
OXAZEPAM UR QL: NOT DETECTED
OXYCODONE UR QL: NOT DETECTED
OXYMORPHONE UR QL: NOT DETECTED
PAIN MANAGEMENT DRUG PANEL: ABNORMAL
PATHOLOGY STUDY: ABNORMAL
PCP: NEGATIVE
PHENTERMINE: NOT DETECTED
PREGABALIN: NOT DETECTED
TAPENTADOL, URINE: NOT DETECTED
TAPENTADOL-O-SULFATE, URINE: NOT DETECTED
TEMAZEPAM: NOT DETECTED
TRAMADOL: NEGATIVE
ZOLPIDEM: NOT DETECTED

## 2025-08-18 DIAGNOSIS — M51.27 HERNIATED NUCLEUS PULPOSUS, L5-S1, RIGHT: ICD-10-CM

## 2025-08-18 DIAGNOSIS — M54.17 LUMBOSACRAL RADICULOPATHY: ICD-10-CM

## 2025-08-19 ENCOUNTER — TELEMEDICINE (OUTPATIENT)
Age: 30
End: 2025-08-19
Payer: MEDICARE

## 2025-08-19 DIAGNOSIS — E11.65 UNCONTROLLED TYPE 2 DIABETES MELLITUS WITH HYPERGLYCEMIA (HCC): Primary | ICD-10-CM

## 2025-08-19 DIAGNOSIS — K29.71 GASTROINTESTINAL HEMORRHAGE ASSOCIATED WITH GASTRITIS, UNSPECIFIED GASTRITIS TYPE: ICD-10-CM

## 2025-08-19 PROCEDURE — 99214 OFFICE O/P EST MOD 30 MIN: CPT | Performed by: NURSE PRACTITIONER

## 2025-08-19 PROCEDURE — 3046F HEMOGLOBIN A1C LEVEL >9.0%: CPT | Performed by: NURSE PRACTITIONER

## 2025-08-19 PROCEDURE — G8427 DOCREV CUR MEDS BY ELIG CLIN: HCPCS | Performed by: NURSE PRACTITIONER

## 2025-08-19 PROCEDURE — 1036F TOBACCO NON-USER: CPT | Performed by: NURSE PRACTITIONER

## 2025-08-19 PROCEDURE — G8420 CALC BMI NORM PARAMETERS: HCPCS | Performed by: NURSE PRACTITIONER

## 2025-08-19 PROCEDURE — 2022F DILAT RTA XM EVC RTNOPTHY: CPT | Performed by: NURSE PRACTITIONER

## 2025-08-19 RX ORDER — HYDROCODONE BITARTRATE AND ACETAMINOPHEN 5; 325 MG/1; MG/1
1 TABLET ORAL EVERY 8 HOURS PRN
Qty: 21 TABLET | Refills: 0 | OUTPATIENT
Start: 2025-08-19 | End: 2025-08-26

## 2025-08-19 ASSESSMENT — ENCOUNTER SYMPTOMS
SHORTNESS OF BREATH: 0
BLOOD IN STOOL: 0
RESPIRATORY NEGATIVE: 1
WHEEZING: 0
COUGH: 0

## 2025-08-22 ENCOUNTER — INITIAL CONSULT (OUTPATIENT)
Age: 30
End: 2025-08-22
Payer: MEDICARE

## 2025-08-22 VITALS
BODY MASS INDEX: 22.32 KG/M2 | SYSTOLIC BLOOD PRESSURE: 112 MMHG | OXYGEN SATURATION: 99 % | HEART RATE: 79 BPM | DIASTOLIC BLOOD PRESSURE: 62 MMHG | HEIGHT: 63 IN | TEMPERATURE: 99.2 F | WEIGHT: 126 LBS

## 2025-08-22 DIAGNOSIS — M54.16 LUMBAR RADICULOPATHY: Primary | ICD-10-CM

## 2025-08-22 DIAGNOSIS — M54.31 SCIATICA OF RIGHT SIDE: ICD-10-CM

## 2025-08-22 PROCEDURE — 99204 OFFICE O/P NEW MOD 45 MIN: CPT | Performed by: PHYSICAL MEDICINE & REHABILITATION

## 2025-08-22 PROCEDURE — G8420 CALC BMI NORM PARAMETERS: HCPCS | Performed by: PHYSICAL MEDICINE & REHABILITATION

## 2025-08-22 PROCEDURE — 99214 OFFICE O/P EST MOD 30 MIN: CPT | Performed by: PHYSICAL MEDICINE & REHABILITATION

## 2025-08-22 PROCEDURE — G8427 DOCREV CUR MEDS BY ELIG CLIN: HCPCS | Performed by: PHYSICAL MEDICINE & REHABILITATION

## 2025-08-27 ENCOUNTER — TELEPHONE (OUTPATIENT)
Age: 30
End: 2025-08-27

## 2025-08-27 ENCOUNTER — ANESTHESIA (OUTPATIENT)
Dept: MRI IMAGING | Age: 30
End: 2025-08-27
Payer: COMMERCIAL

## 2025-08-27 ENCOUNTER — ANESTHESIA EVENT (OUTPATIENT)
Dept: MRI IMAGING | Age: 30
End: 2025-08-27
Payer: COMMERCIAL

## 2025-08-27 ENCOUNTER — HOSPITAL ENCOUNTER (OUTPATIENT)
Dept: MRI IMAGING | Age: 30
Discharge: HOME | End: 2025-08-29
Attending: NEUROLOGICAL SURGERY
Payer: COMMERCIAL

## 2025-08-27 ENCOUNTER — PATIENT MESSAGE (OUTPATIENT)
Age: 30
End: 2025-08-27

## 2025-08-27 VITALS
DIASTOLIC BLOOD PRESSURE: 59 MMHG | HEIGHT: 63 IN | BODY MASS INDEX: 22.32 KG/M2 | TEMPERATURE: 98 F | RESPIRATION RATE: 12 BRPM | WEIGHT: 126 LBS | SYSTOLIC BLOOD PRESSURE: 91 MMHG | OXYGEN SATURATION: 99 % | HEART RATE: 58 BPM

## 2025-08-27 DIAGNOSIS — F40.240 CLAUSTROPHOBIA: ICD-10-CM

## 2025-08-27 DIAGNOSIS — M54.16 RIGHT LUMBAR RADICULOPATHY: ICD-10-CM

## 2025-08-27 DIAGNOSIS — M47.816 LUMBAR SPONDYLOSIS: ICD-10-CM

## 2025-08-27 DIAGNOSIS — M32.9 SYSTEMIC LUPUS ERYTHEMATOSUS, UNSPECIFIED SLE TYPE, UNSPECIFIED ORGAN INVOLVEMENT STATUS (HCC): ICD-10-CM

## 2025-08-27 DIAGNOSIS — I78.0 HEREDITARY HEMORRHAGIC TELANGIECTASIA: ICD-10-CM

## 2025-08-27 DIAGNOSIS — I78.0 OSLER-WEBER-RENDU DISEASE: ICD-10-CM

## 2025-08-27 DIAGNOSIS — Z87.898 H/O URINARY RETENTION: ICD-10-CM

## 2025-08-27 PROCEDURE — 6360000002 HC RX W HCPCS

## 2025-08-27 PROCEDURE — 72146 MRI CHEST SPINE W/O DYE: CPT

## 2025-08-27 PROCEDURE — 2580000003 HC RX 258: Performed by: STUDENT IN AN ORGANIZED HEALTH CARE EDUCATION/TRAINING PROGRAM

## 2025-08-27 RX ORDER — SODIUM CHLORIDE 0.9 % (FLUSH) 0.9 %
5-40 SYRINGE (ML) INJECTION EVERY 12 HOURS SCHEDULED
Status: DISCONTINUED | OUTPATIENT
Start: 2025-08-27 | End: 2025-08-30 | Stop reason: HOSPADM

## 2025-08-27 RX ORDER — SODIUM CHLORIDE 0.9 % (FLUSH) 0.9 %
5-40 SYRINGE (ML) INJECTION PRN
Status: DISCONTINUED | OUTPATIENT
Start: 2025-08-27 | End: 2025-08-30 | Stop reason: HOSPADM

## 2025-08-27 RX ORDER — PROCHLORPERAZINE EDISYLATE 5 MG/ML
5 INJECTION INTRAMUSCULAR; INTRAVENOUS
Status: DISCONTINUED | OUTPATIENT
Start: 2025-08-27 | End: 2025-08-30 | Stop reason: HOSPADM

## 2025-08-27 RX ORDER — SODIUM CHLORIDE, SODIUM LACTATE, POTASSIUM CHLORIDE, CALCIUM CHLORIDE 600; 310; 30; 20 MG/100ML; MG/100ML; MG/100ML; MG/100ML
INJECTION, SOLUTION INTRAVENOUS CONTINUOUS
Status: DISCONTINUED | OUTPATIENT
Start: 2025-08-27 | End: 2025-08-30 | Stop reason: HOSPADM

## 2025-08-27 RX ORDER — HYDRALAZINE HYDROCHLORIDE 20 MG/ML
10 INJECTION INTRAMUSCULAR; INTRAVENOUS
Status: DISCONTINUED | OUTPATIENT
Start: 2025-08-27 | End: 2025-08-30 | Stop reason: HOSPADM

## 2025-08-27 RX ORDER — SODIUM CHLORIDE 9 MG/ML
INJECTION, SOLUTION INTRAVENOUS CONTINUOUS
Status: DISCONTINUED | OUTPATIENT
Start: 2025-08-27 | End: 2025-08-30 | Stop reason: HOSPADM

## 2025-08-27 RX ORDER — LABETALOL HYDROCHLORIDE 5 MG/ML
10 INJECTION, SOLUTION INTRAVENOUS
Status: DISCONTINUED | OUTPATIENT
Start: 2025-08-27 | End: 2025-08-30 | Stop reason: HOSPADM

## 2025-08-27 RX ORDER — SODIUM CHLORIDE 9 MG/ML
INJECTION, SOLUTION INTRAVENOUS PRN
Status: DISCONTINUED | OUTPATIENT
Start: 2025-08-27 | End: 2025-08-30 | Stop reason: HOSPADM

## 2025-08-27 RX ORDER — DIPHENHYDRAMINE HYDROCHLORIDE 50 MG/ML
12.5 INJECTION, SOLUTION INTRAMUSCULAR; INTRAVENOUS
Status: DISCONTINUED | OUTPATIENT
Start: 2025-08-27 | End: 2025-08-30 | Stop reason: HOSPADM

## 2025-08-27 RX ORDER — MIDAZOLAM HYDROCHLORIDE 1 MG/ML
INJECTION, SOLUTION INTRAMUSCULAR; INTRAVENOUS
Status: DISCONTINUED | OUTPATIENT
Start: 2025-08-27 | End: 2025-08-27 | Stop reason: SDUPTHER

## 2025-08-27 RX ORDER — LIDOCAINE HYDROCHLORIDE 10 MG/ML
1 INJECTION, SOLUTION EPIDURAL; INFILTRATION; INTRACAUDAL; PERINEURAL
Status: DISCONTINUED | OUTPATIENT
Start: 2025-08-27 | End: 2025-08-30 | Stop reason: HOSPADM

## 2025-08-27 RX ORDER — ONDANSETRON 2 MG/ML
4 INJECTION INTRAMUSCULAR; INTRAVENOUS
Status: DISCONTINUED | OUTPATIENT
Start: 2025-08-27 | End: 2025-08-30 | Stop reason: HOSPADM

## 2025-08-27 RX ORDER — PROPOFOL 10 MG/ML
INJECTION, EMULSION INTRAVENOUS
Status: DISCONTINUED | OUTPATIENT
Start: 2025-08-27 | End: 2025-08-27 | Stop reason: SDUPTHER

## 2025-08-27 RX ADMIN — MIDAZOLAM HYDROCHLORIDE 2 MG: 1 INJECTION, SOLUTION INTRAMUSCULAR; INTRAVENOUS at 10:03

## 2025-08-27 RX ADMIN — PROPOFOL 100 MCG/KG/MIN: 10 INJECTION, EMULSION INTRAVENOUS at 10:07

## 2025-08-27 RX ADMIN — PROPOFOL 80 MG: 10 INJECTION, EMULSION INTRAVENOUS at 10:06

## 2025-08-27 RX ADMIN — SODIUM CHLORIDE: 0.9 INJECTION, SOLUTION INTRAVENOUS at 08:25

## 2025-08-29 ENCOUNTER — OFFICE VISIT (OUTPATIENT)
Age: 30
End: 2025-08-29
Payer: MEDICARE

## 2025-08-29 VITALS
BODY MASS INDEX: 21.51 KG/M2 | WEIGHT: 126 LBS | HEIGHT: 64 IN | HEART RATE: 79 BPM | TEMPERATURE: 97.2 F | OXYGEN SATURATION: 98 %

## 2025-08-29 DIAGNOSIS — M54.16 LUMBAR RADICULOPATHY: Primary | ICD-10-CM

## 2025-08-29 DIAGNOSIS — M54.31 SCIATICA OF RIGHT SIDE: ICD-10-CM

## 2025-08-29 PROCEDURE — 99212 OFFICE O/P EST SF 10 MIN: CPT | Performed by: PAIN MEDICINE

## 2025-08-29 PROCEDURE — 99213 OFFICE O/P EST LOW 20 MIN: CPT | Performed by: PAIN MEDICINE

## 2025-08-29 PROCEDURE — G8420 CALC BMI NORM PARAMETERS: HCPCS | Performed by: PAIN MEDICINE

## 2025-08-29 PROCEDURE — G8427 DOCREV CUR MEDS BY ELIG CLIN: HCPCS | Performed by: PAIN MEDICINE

## 2025-08-29 PROCEDURE — 1036F TOBACCO NON-USER: CPT | Performed by: PAIN MEDICINE

## 2025-08-29 ASSESSMENT — ENCOUNTER SYMPTOMS
GASTROINTESTINAL NEGATIVE: 1
ALLERGIC/IMMUNOLOGIC NEGATIVE: 1
RESPIRATORY NEGATIVE: 1
EYES NEGATIVE: 1

## 2025-09-05 ENCOUNTER — OFFICE VISIT (OUTPATIENT)
Age: 30
End: 2025-09-05
Payer: MEDICARE

## 2025-09-05 VITALS
HEIGHT: 64 IN | HEART RATE: 91 BPM | TEMPERATURE: 98.8 F | BODY MASS INDEX: 21.51 KG/M2 | WEIGHT: 126 LBS | OXYGEN SATURATION: 98 %

## 2025-09-05 DIAGNOSIS — G83.4 CAUDA EQUINA COMPRESSION (HCC): ICD-10-CM

## 2025-09-05 DIAGNOSIS — M54.31 SCIATICA OF RIGHT SIDE: Primary | ICD-10-CM

## 2025-09-05 DIAGNOSIS — M54.16 LUMBAR RADICULOPATHY: ICD-10-CM

## 2025-09-05 PROCEDURE — G8427 DOCREV CUR MEDS BY ELIG CLIN: HCPCS | Performed by: PAIN MEDICINE

## 2025-09-05 PROCEDURE — G8420 CALC BMI NORM PARAMETERS: HCPCS | Performed by: PAIN MEDICINE

## 2025-09-05 PROCEDURE — 1036F TOBACCO NON-USER: CPT | Performed by: PAIN MEDICINE

## 2025-09-05 PROCEDURE — 99212 OFFICE O/P EST SF 10 MIN: CPT | Performed by: PAIN MEDICINE

## 2025-09-05 PROCEDURE — 99213 OFFICE O/P EST LOW 20 MIN: CPT | Performed by: PAIN MEDICINE

## 2025-09-05 ASSESSMENT — ENCOUNTER SYMPTOMS
ALLERGIC/IMMUNOLOGIC NEGATIVE: 1
GASTROINTESTINAL NEGATIVE: 1
RESPIRATORY NEGATIVE: 1
EYES NEGATIVE: 1